# Patient Record
Sex: FEMALE | Race: WHITE | Employment: OTHER | URBAN - NONMETROPOLITAN AREA
[De-identification: names, ages, dates, MRNs, and addresses within clinical notes are randomized per-mention and may not be internally consistent; named-entity substitution may affect disease eponyms.]

---

## 2020-06-16 ENCOUNTER — APPOINTMENT (OUTPATIENT)
Dept: CT IMAGING | Age: 65
DRG: 291 | End: 2020-06-16
Payer: MEDICARE

## 2020-06-16 ENCOUNTER — HOSPITAL ENCOUNTER (INPATIENT)
Age: 65
LOS: 3 days | Discharge: ANOTHER HOSPITAL | DRG: 291 | End: 2020-06-19
Attending: EMERGENCY MEDICINE | Admitting: INTERNAL MEDICINE
Payer: MEDICARE

## 2020-06-16 ENCOUNTER — APPOINTMENT (OUTPATIENT)
Dept: GENERAL RADIOLOGY | Age: 65
DRG: 291 | End: 2020-06-16
Payer: MEDICARE

## 2020-06-16 PROBLEM — I50.9 ACUTE CONGESTIVE HEART FAILURE (HCC): Status: ACTIVE | Noted: 2020-06-16

## 2020-06-16 LAB
-: ABNORMAL
ABSOLUTE EOS #: <0.03 K/UL (ref 0–0.44)
ABSOLUTE IMMATURE GRANULOCYTE: 0.03 K/UL (ref 0–0.3)
ABSOLUTE LYMPH #: 1.27 K/UL (ref 1.1–3.7)
ABSOLUTE MONO #: 0.48 K/UL (ref 0.1–1.2)
ALBUMIN SERPL-MCNC: 3.9 G/DL (ref 3.5–5.2)
ALBUMIN/GLOBULIN RATIO: 1.6 (ref 1–2.5)
ALP BLD-CCNC: 105 U/L (ref 35–104)
ALT SERPL-CCNC: 14 U/L (ref 5–33)
AMORPHOUS: ABNORMAL
ANION GAP SERPL CALCULATED.3IONS-SCNC: 14 MMOL/L (ref 9–17)
AST SERPL-CCNC: 25 U/L
BACTERIA: ABNORMAL
BASOPHILS # BLD: 1 % (ref 0–2)
BASOPHILS ABSOLUTE: 0.05 K/UL (ref 0–0.2)
BILIRUB SERPL-MCNC: 0.53 MG/DL (ref 0.3–1.2)
BILIRUBIN URINE: NEGATIVE
BNP INTERPRETATION: ABNORMAL
BUN BLDV-MCNC: 8 MG/DL (ref 8–23)
BUN/CREAT BLD: 19 (ref 9–20)
CALCIUM SERPL-MCNC: 7.7 MG/DL (ref 8.6–10.4)
CASTS UA: ABNORMAL /LPF
CHLORIDE BLD-SCNC: 103 MMOL/L (ref 98–107)
CO2: 26 MMOL/L (ref 20–31)
COLOR: YELLOW
COMMENT UA: NORMAL
CREAT SERPL-MCNC: 0.42 MG/DL (ref 0.5–0.9)
CRYSTALS, UA: ABNORMAL /HPF
D-DIMER QUANTITATIVE: 1.73 MG/L FEU (ref 0–0.59)
DIFFERENTIAL TYPE: ABNORMAL
EKG ATRIAL RATE: 92 BPM
EKG P AXIS: 78 DEGREES
EKG P-R INTERVAL: 130 MS
EKG Q-T INTERVAL: 370 MS
EKG QRS DURATION: 82 MS
EKG QTC CALCULATION (BAZETT): 457 MS
EKG R AXIS: 22 DEGREES
EKG T AXIS: 81 DEGREES
EKG VENTRICULAR RATE: 92 BPM
EOSINOPHILS RELATIVE PERCENT: 0 % (ref 1–4)
EPITHELIAL CELLS UA: ABNORMAL /HPF (ref 0–25)
GFR AFRICAN AMERICAN: >60 ML/MIN
GFR NON-AFRICAN AMERICAN: >60 ML/MIN
GFR SERPL CREATININE-BSD FRML MDRD: ABNORMAL ML/MIN/{1.73_M2}
GFR SERPL CREATININE-BSD FRML MDRD: ABNORMAL ML/MIN/{1.73_M2}
GLUCOSE BLD-MCNC: 112 MG/DL (ref 70–99)
GLUCOSE URINE: NEGATIVE
HCT VFR BLD CALC: 48.1 % (ref 36.3–47.1)
HEMOGLOBIN: 14.5 G/DL (ref 11.9–15.1)
IMMATURE GRANULOCYTES: 1 %
INR BLD: 1.2
KETONES, URINE: NEGATIVE
LEUKOCYTE ESTERASE, URINE: NEGATIVE
LYMPHOCYTES # BLD: 20 % (ref 24–43)
MCH RBC QN AUTO: 26.7 PG (ref 25.2–33.5)
MCHC RBC AUTO-ENTMCNC: 30.1 G/DL (ref 28.4–34.8)
MCV RBC AUTO: 88.4 FL (ref 82.6–102.9)
MONOCYTES # BLD: 8 % (ref 3–12)
MUCUS: ABNORMAL
NITRITE, URINE: NEGATIVE
NRBC AUTOMATED: 0 PER 100 WBC
OTHER OBSERVATIONS UA: ABNORMAL
PDW BLD-RTO: 17.9 % (ref 11.8–14.4)
PH UA: 6 (ref 5–9)
PLATELET # BLD: 186 K/UL (ref 138–453)
PLATELET ESTIMATE: ABNORMAL
PMV BLD AUTO: 12.2 FL (ref 8.1–13.5)
POTASSIUM SERPL-SCNC: 3.6 MMOL/L (ref 3.7–5.3)
PRO-BNP: 9013 PG/ML
PROTEIN UA: NEGATIVE
PROTHROMBIN TIME: 14.8 SEC (ref 11.8–14.6)
RBC # BLD: 5.44 M/UL (ref 3.95–5.11)
RBC # BLD: ABNORMAL 10*6/UL
RBC UA: ABNORMAL /HPF (ref 0–2)
RENAL EPITHELIAL, UA: ABNORMAL /HPF
SARS-COV-2, PCR: NORMAL
SARS-COV-2, RAPID: NOT DETECTED
SARS-COV-2: NORMAL
SEG NEUTROPHILS: 70 % (ref 36–65)
SEGMENTED NEUTROPHILS ABSOLUTE COUNT: 4.52 K/UL (ref 1.5–8.1)
SODIUM BLD-SCNC: 143 MMOL/L (ref 135–144)
SOURCE: NORMAL
SPECIFIC GRAVITY UA: 1.01 (ref 1.01–1.02)
TOTAL PROTEIN: 6.4 G/DL (ref 6.4–8.3)
TRICHOMONAS: ABNORMAL
TROPONIN INTERP: ABNORMAL
TROPONIN INTERP: ABNORMAL
TROPONIN T: ABNORMAL NG/ML
TROPONIN T: ABNORMAL NG/ML
TROPONIN, HIGH SENSITIVITY: 30 NG/L (ref 0–14)
TROPONIN, HIGH SENSITIVITY: 31 NG/L (ref 0–14)
TSH SERPL DL<=0.05 MIU/L-ACNC: 0.75 MIU/L (ref 0.3–5)
TURBIDITY: CLEAR
URINE HGB: NEGATIVE
UROBILINOGEN, URINE: NORMAL
WBC # BLD: 6.4 K/UL (ref 3.5–11.3)
WBC # BLD: ABNORMAL 10*3/UL
WBC UA: ABNORMAL /HPF (ref 0–5)
YEAST: ABNORMAL

## 2020-06-16 PROCEDURE — 85610 PROTHROMBIN TIME: CPT

## 2020-06-16 PROCEDURE — 71260 CT THORAX DX C+: CPT

## 2020-06-16 PROCEDURE — 6360000004 HC RX CONTRAST MEDICATION: Performed by: EMERGENCY MEDICINE

## 2020-06-16 PROCEDURE — U0002 COVID-19 LAB TEST NON-CDC: HCPCS

## 2020-06-16 PROCEDURE — 6370000000 HC RX 637 (ALT 250 FOR IP): Performed by: INTERNAL MEDICINE

## 2020-06-16 PROCEDURE — 71045 X-RAY EXAM CHEST 1 VIEW: CPT

## 2020-06-16 PROCEDURE — 84443 ASSAY THYROID STIM HORMONE: CPT

## 2020-06-16 PROCEDURE — 94664 DEMO&/EVAL PT USE INHALER: CPT

## 2020-06-16 PROCEDURE — 36415 COLL VENOUS BLD VENIPUNCTURE: CPT

## 2020-06-16 PROCEDURE — 85379 FIBRIN DEGRADATION QUANT: CPT

## 2020-06-16 PROCEDURE — 6360000002 HC RX W HCPCS: Performed by: INTERNAL MEDICINE

## 2020-06-16 PROCEDURE — 6370000000 HC RX 637 (ALT 250 FOR IP): Performed by: EMERGENCY MEDICINE

## 2020-06-16 PROCEDURE — 6360000002 HC RX W HCPCS: Performed by: EMERGENCY MEDICINE

## 2020-06-16 PROCEDURE — 93010 ELECTROCARDIOGRAM REPORT: CPT | Performed by: INTERNAL MEDICINE

## 2020-06-16 PROCEDURE — 83880 ASSAY OF NATRIURETIC PEPTIDE: CPT

## 2020-06-16 PROCEDURE — 84484 ASSAY OF TROPONIN QUANT: CPT

## 2020-06-16 PROCEDURE — 1200000000 HC SEMI PRIVATE

## 2020-06-16 PROCEDURE — 94640 AIRWAY INHALATION TREATMENT: CPT

## 2020-06-16 PROCEDURE — 96375 TX/PRO/DX INJ NEW DRUG ADDON: CPT

## 2020-06-16 PROCEDURE — 93005 ELECTROCARDIOGRAM TRACING: CPT | Performed by: EMERGENCY MEDICINE

## 2020-06-16 PROCEDURE — 80053 COMPREHEN METABOLIC PANEL: CPT

## 2020-06-16 PROCEDURE — 2580000003 HC RX 258: Performed by: INTERNAL MEDICINE

## 2020-06-16 PROCEDURE — 96374 THER/PROPH/DIAG INJ IV PUSH: CPT

## 2020-06-16 PROCEDURE — 85025 COMPLETE CBC W/AUTO DIFF WBC: CPT

## 2020-06-16 PROCEDURE — 81001 URINALYSIS AUTO W/SCOPE: CPT

## 2020-06-16 PROCEDURE — 99285 EMERGENCY DEPT VISIT HI MDM: CPT

## 2020-06-16 PROCEDURE — 84439 ASSAY OF FREE THYROXINE: CPT

## 2020-06-16 RX ORDER — PROMETHAZINE HYDROCHLORIDE 25 MG/1
12.5 TABLET ORAL EVERY 6 HOURS PRN
Status: DISCONTINUED | OUTPATIENT
Start: 2020-06-16 | End: 2020-06-19 | Stop reason: HOSPADM

## 2020-06-16 RX ORDER — ACETAMINOPHEN 325 MG/1
650 TABLET ORAL EVERY 6 HOURS PRN
Status: DISCONTINUED | OUTPATIENT
Start: 2020-06-16 | End: 2020-06-19 | Stop reason: HOSPADM

## 2020-06-16 RX ORDER — FAMOTIDINE 20 MG/1
20 TABLET, FILM COATED ORAL 2 TIMES DAILY
Status: DISCONTINUED | OUTPATIENT
Start: 2020-06-16 | End: 2020-06-19 | Stop reason: HOSPADM

## 2020-06-16 RX ORDER — METHYLPREDNISOLONE SODIUM SUCCINATE 125 MG/2ML
125 INJECTION, POWDER, LYOPHILIZED, FOR SOLUTION INTRAMUSCULAR; INTRAVENOUS ONCE
Status: COMPLETED | OUTPATIENT
Start: 2020-06-16 | End: 2020-06-16

## 2020-06-16 RX ORDER — NITROGLYCERIN 0.4 MG/1
0.4 TABLET SUBLINGUAL ONCE
Status: COMPLETED | OUTPATIENT
Start: 2020-06-16 | End: 2020-06-16

## 2020-06-16 RX ORDER — SODIUM CHLORIDE 0.9 % (FLUSH) 0.9 %
10 SYRINGE (ML) INJECTION EVERY 12 HOURS SCHEDULED
Status: DISCONTINUED | OUTPATIENT
Start: 2020-06-16 | End: 2020-06-19 | Stop reason: HOSPADM

## 2020-06-16 RX ORDER — LISINOPRIL 5 MG/1
5 TABLET ORAL DAILY
Status: DISCONTINUED | OUTPATIENT
Start: 2020-06-16 | End: 2020-06-18

## 2020-06-16 RX ORDER — FUROSEMIDE 10 MG/ML
40 INJECTION INTRAMUSCULAR; INTRAVENOUS DAILY
Status: DISCONTINUED | OUTPATIENT
Start: 2020-06-16 | End: 2020-06-17

## 2020-06-16 RX ORDER — ACETAMINOPHEN 650 MG/1
650 SUPPOSITORY RECTAL EVERY 6 HOURS PRN
Status: DISCONTINUED | OUTPATIENT
Start: 2020-06-16 | End: 2020-06-19 | Stop reason: HOSPADM

## 2020-06-16 RX ORDER — FUROSEMIDE 10 MG/ML
40 INJECTION INTRAMUSCULAR; INTRAVENOUS ONCE
Status: COMPLETED | OUTPATIENT
Start: 2020-06-16 | End: 2020-06-16

## 2020-06-16 RX ORDER — SODIUM CHLORIDE 0.9 % (FLUSH) 0.9 %
10 SYRINGE (ML) INJECTION PRN
Status: DISCONTINUED | OUTPATIENT
Start: 2020-06-16 | End: 2020-06-19 | Stop reason: HOSPADM

## 2020-06-16 RX ORDER — POLYETHYLENE GLYCOL 3350 17 G/17G
17 POWDER, FOR SOLUTION ORAL DAILY PRN
Status: DISCONTINUED | OUTPATIENT
Start: 2020-06-16 | End: 2020-06-19 | Stop reason: HOSPADM

## 2020-06-16 RX ORDER — ONDANSETRON 2 MG/ML
4 INJECTION INTRAMUSCULAR; INTRAVENOUS EVERY 6 HOURS PRN
Status: DISCONTINUED | OUTPATIENT
Start: 2020-06-16 | End: 2020-06-19 | Stop reason: HOSPADM

## 2020-06-16 RX ORDER — ACETAMINOPHEN 500 MG
1000 TABLET ORAL ONCE
Status: COMPLETED | OUTPATIENT
Start: 2020-06-16 | End: 2020-06-16

## 2020-06-16 RX ORDER — IPRATROPIUM BROMIDE AND ALBUTEROL SULFATE 2.5; .5 MG/3ML; MG/3ML
1 SOLUTION RESPIRATORY (INHALATION) ONCE
Status: COMPLETED | OUTPATIENT
Start: 2020-06-16 | End: 2020-06-16

## 2020-06-16 RX ORDER — BIOTIN 10 MG
1 TABLET ORAL DAILY
COMMUNITY

## 2020-06-16 RX ORDER — DIAPER,BRIEF,INFANT-TODD,DISP
EACH MISCELLANEOUS 2 TIMES DAILY
Status: ON HOLD | COMMUNITY
End: 2020-06-25 | Stop reason: HOSPADM

## 2020-06-16 RX ADMIN — SODIUM CHLORIDE, PRESERVATIVE FREE 10 ML: 5 INJECTION INTRAVENOUS at 20:21

## 2020-06-16 RX ADMIN — FAMOTIDINE 20 MG: 20 TABLET, FILM COATED ORAL at 20:18

## 2020-06-16 RX ADMIN — NITROGLYCERIN 0.4 MG: 0.4 TABLET SUBLINGUAL at 14:19

## 2020-06-16 RX ADMIN — ACETAMINOPHEN 1000 MG: 500 TABLET, FILM COATED ORAL at 14:47

## 2020-06-16 RX ADMIN — LISINOPRIL 5 MG: 5 TABLET ORAL at 20:18

## 2020-06-16 RX ADMIN — IOPAMIDOL 75 ML: 755 INJECTION, SOLUTION INTRAVENOUS at 15:18

## 2020-06-16 RX ADMIN — ENOXAPARIN SODIUM 90 MG: 100 INJECTION SUBCUTANEOUS at 20:18

## 2020-06-16 RX ADMIN — IPRATROPIUM BROMIDE AND ALBUTEROL SULFATE 1 AMPULE: .5; 3 SOLUTION RESPIRATORY (INHALATION) at 13:20

## 2020-06-16 RX ADMIN — METHYLPREDNISOLONE SODIUM SUCCINATE 125 MG: 125 INJECTION, POWDER, FOR SOLUTION INTRAMUSCULAR; INTRAVENOUS at 13:18

## 2020-06-16 RX ADMIN — FUROSEMIDE 40 MG: 10 INJECTION, SOLUTION INTRAMUSCULAR; INTRAVENOUS at 13:17

## 2020-06-16 ASSESSMENT — PAIN SCALES - GENERAL
PAINLEVEL_OUTOF10: 4
PAINLEVEL_OUTOF10: 0

## 2020-06-16 NOTE — ED NOTES
Dr. Hazel Jang called back and spoke to Dr. Alesia Hilton.  Alie Santos, RN       Alie Santos, RN  06/16/20 0577

## 2020-06-17 ENCOUNTER — APPOINTMENT (OUTPATIENT)
Dept: VASCULAR LAB | Age: 65
DRG: 291 | End: 2020-06-17
Payer: MEDICARE

## 2020-06-17 ENCOUNTER — APPOINTMENT (OUTPATIENT)
Dept: NON INVASIVE DIAGNOSTICS | Age: 65
DRG: 291 | End: 2020-06-17
Payer: MEDICARE

## 2020-06-17 PROBLEM — I26.99 ACUTE PULMONARY EMBOLISM (HCC): Status: ACTIVE | Noted: 2020-06-17

## 2020-06-17 PROBLEM — I50.31 ACUTE DIASTOLIC CONGESTIVE HEART FAILURE (HCC): Status: ACTIVE | Noted: 2020-06-16

## 2020-06-17 PROBLEM — R60.1 ANASARCA: Status: ACTIVE | Noted: 2020-06-17

## 2020-06-17 LAB
ANION GAP SERPL CALCULATED.3IONS-SCNC: 11 MMOL/L (ref 9–17)
BUN BLDV-MCNC: 7 MG/DL (ref 8–23)
BUN/CREAT BLD: 14 (ref 9–20)
CALCIUM SERPL-MCNC: 7.2 MG/DL (ref 8.6–10.4)
CHLORIDE BLD-SCNC: 104 MMOL/L (ref 98–107)
CHOLESTEROL/HDL RATIO: 2.1
CHOLESTEROL: 110 MG/DL
CO2: 32 MMOL/L (ref 20–31)
CREAT SERPL-MCNC: 0.51 MG/DL (ref 0.5–0.9)
EKG ATRIAL RATE: 101 BPM
EKG P AXIS: 67 DEGREES
EKG P-R INTERVAL: 132 MS
EKG Q-T INTERVAL: 358 MS
EKG QRS DURATION: 74 MS
EKG QTC CALCULATION (BAZETT): 464 MS
EKG R AXIS: 6 DEGREES
EKG T AXIS: 64 DEGREES
EKG VENTRICULAR RATE: 101 BPM
GFR AFRICAN AMERICAN: >60 ML/MIN
GFR NON-AFRICAN AMERICAN: >60 ML/MIN
GFR SERPL CREATININE-BSD FRML MDRD: ABNORMAL ML/MIN/{1.73_M2}
GFR SERPL CREATININE-BSD FRML MDRD: ABNORMAL ML/MIN/{1.73_M2}
GLUCOSE BLD-MCNC: 86 MG/DL (ref 70–99)
HDLC SERPL-MCNC: 52 MG/DL
LDL CHOLESTEROL: 47 MG/DL (ref 0–130)
LV EF: 23 %
LVEF MODALITY: NORMAL
MAGNESIUM: 1.8 MG/DL (ref 1.6–2.6)
POTASSIUM SERPL-SCNC: 3.5 MMOL/L (ref 3.7–5.3)
SODIUM BLD-SCNC: 147 MMOL/L (ref 135–144)
THYROXINE, FREE: 1.53 NG/DL (ref 0.93–1.7)
TRIGL SERPL-MCNC: 56 MG/DL
VLDLC SERPL CALC-MCNC: NORMAL MG/DL (ref 1–30)

## 2020-06-17 PROCEDURE — 6360000002 HC RX W HCPCS: Performed by: FAMILY MEDICINE

## 2020-06-17 PROCEDURE — 6370000000 HC RX 637 (ALT 250 FOR IP): Performed by: INTERNAL MEDICINE

## 2020-06-17 PROCEDURE — 93306 TTE W/DOPPLER COMPLETE: CPT

## 2020-06-17 PROCEDURE — 80061 LIPID PANEL: CPT

## 2020-06-17 PROCEDURE — 3430000000 HC RX DIAGNOSTIC RADIOPHARMACEUTICAL: Performed by: INTERNAL MEDICINE

## 2020-06-17 PROCEDURE — 93017 CV STRESS TEST TRACING ONLY: CPT

## 2020-06-17 PROCEDURE — 78452 HT MUSCLE IMAGE SPECT MULT: CPT

## 2020-06-17 PROCEDURE — 83735 ASSAY OF MAGNESIUM: CPT

## 2020-06-17 PROCEDURE — 93010 ELECTROCARDIOGRAM REPORT: CPT | Performed by: INTERNAL MEDICINE

## 2020-06-17 PROCEDURE — 93005 ELECTROCARDIOGRAM TRACING: CPT | Performed by: NURSE PRACTITIONER

## 2020-06-17 PROCEDURE — 36415 COLL VENOUS BLD VENIPUNCTURE: CPT

## 2020-06-17 PROCEDURE — 6360000002 HC RX W HCPCS: Performed by: INTERNAL MEDICINE

## 2020-06-17 PROCEDURE — 1200000000 HC SEMI PRIVATE

## 2020-06-17 PROCEDURE — 80048 BASIC METABOLIC PNL TOTAL CA: CPT

## 2020-06-17 PROCEDURE — 93970 EXTREMITY STUDY: CPT

## 2020-06-17 PROCEDURE — 2580000003 HC RX 258: Performed by: INTERNAL MEDICINE

## 2020-06-17 PROCEDURE — 99222 1ST HOSP IP/OBS MODERATE 55: CPT | Performed by: INTERNAL MEDICINE

## 2020-06-17 PROCEDURE — A9500 TC99M SESTAMIBI: HCPCS | Performed by: INTERNAL MEDICINE

## 2020-06-17 RX ORDER — CARVEDILOL 3.12 MG/1
3.12 TABLET ORAL 2 TIMES DAILY WITH MEALS
Status: DISCONTINUED | OUTPATIENT
Start: 2020-06-17 | End: 2020-06-19 | Stop reason: HOSPADM

## 2020-06-17 RX ORDER — FUROSEMIDE 10 MG/ML
40 INJECTION INTRAMUSCULAR; INTRAVENOUS 2 TIMES DAILY
Status: DISCONTINUED | OUTPATIENT
Start: 2020-06-17 | End: 2020-06-18

## 2020-06-17 RX ADMIN — FAMOTIDINE 20 MG: 20 TABLET, FILM COATED ORAL at 21:40

## 2020-06-17 RX ADMIN — SODIUM CHLORIDE, PRESERVATIVE FREE 10 ML: 5 INJECTION INTRAVENOUS at 08:23

## 2020-06-17 RX ADMIN — ENOXAPARIN SODIUM 90 MG: 100 INJECTION SUBCUTANEOUS at 21:40

## 2020-06-17 RX ADMIN — ACETAMINOPHEN 650 MG: 325 TABLET, FILM COATED ORAL at 14:50

## 2020-06-17 RX ADMIN — LISINOPRIL 5 MG: 5 TABLET ORAL at 08:31

## 2020-06-17 RX ADMIN — FUROSEMIDE 40 MG: 10 INJECTION, SOLUTION INTRAMUSCULAR; INTRAVENOUS at 08:22

## 2020-06-17 RX ADMIN — Medication 10 MILLICURIE: at 10:40

## 2020-06-17 RX ADMIN — CARVEDILOL 3.12 MG: 3.12 TABLET, FILM COATED ORAL at 16:57

## 2020-06-17 RX ADMIN — FUROSEMIDE 40 MG: 10 INJECTION, SOLUTION INTRAMUSCULAR; INTRAVENOUS at 16:57

## 2020-06-17 RX ADMIN — Medication 30 MILLICURIE: at 12:28

## 2020-06-17 RX ADMIN — FAMOTIDINE 20 MG: 20 TABLET, FILM COATED ORAL at 08:21

## 2020-06-17 RX ADMIN — SODIUM CHLORIDE, PRESERVATIVE FREE 10 ML: 5 INJECTION INTRAVENOUS at 21:40

## 2020-06-17 RX ADMIN — ENOXAPARIN SODIUM 90 MG: 100 INJECTION SUBCUTANEOUS at 08:22

## 2020-06-17 RX ADMIN — REGADENOSON 0.4 MG: 0.08 INJECTION, SOLUTION INTRAVENOUS at 12:28

## 2020-06-17 ASSESSMENT — PAIN DESCRIPTION - FREQUENCY: FREQUENCY: INTERMITTENT

## 2020-06-17 ASSESSMENT — ENCOUNTER SYMPTOMS
ABDOMINAL PAIN: 0
EYE PAIN: 0
SHORTNESS OF BREATH: 1

## 2020-06-17 ASSESSMENT — PAIN DESCRIPTION - DESCRIPTORS: DESCRIPTORS: ACHING

## 2020-06-17 ASSESSMENT — PAIN SCALES - GENERAL
PAINLEVEL_OUTOF10: 0
PAINLEVEL_OUTOF10: 4
PAINLEVEL_OUTOF10: 0
PAINLEVEL_OUTOF10: 2
PAINLEVEL_OUTOF10: 0

## 2020-06-17 ASSESSMENT — PAIN DESCRIPTION - PAIN TYPE: TYPE: ACUTE PAIN

## 2020-06-17 ASSESSMENT — PAIN DESCRIPTION - LOCATION: LOCATION: BACK

## 2020-06-17 ASSESSMENT — PAIN DESCRIPTION - ORIENTATION: ORIENTATION: LOWER

## 2020-06-17 NOTE — PROGRESS NOTES
Met with Patient this a.m. to discuss her discharge plans. Patient is a very pleasant 72year old , white female, admitted with a diagnosis of Acute CHF. Patient is alert and oriented, cooperative with this assessment. States that she would like to return home with family upon discharge. Patient resides in [facility] with her . Other family members are currently staying with Patient as well. She uses no DME and has no outside resources currently in place. Patient drives herself, manages her own medications and is independent with her ADL's. PCP is [facility] practitioner Aleshia Gunter. Patient reports that she has \"good insurance\" and denies needing further financial assistance to afford her routine medications. Discharge plan is home when medically stable. Discussed additional services and home health care options but Patient does not feel this is warranted at this time. LSW to monitor for additional needs and assist accordingly. Patient is a 'Full Code' status. States that her medical directives are in place and she will have her  bring a copy to the hospital for her medical record.     Adrián. [address]  6/17/2020

## 2020-06-17 NOTE — PROGRESS NOTES
(Comment)(Diet history of excessive sodium intake, diagnosis of CHF, and severe edema.  ;)    Objective Information:  · Nutrition-Focused Physical Findings: Edema   · Wound Type: None  · Current Nutrition Therapies:  · Oral Diet Orders: NPO   · Oral Diet intake: NPO(% prior to NPO status )  · Oral Nutrition Supplement (ONS) Orders: None  · ONS intake: NPO  · Anthropometric Measures:  · Ht: 5' 5\" (165.1 cm)   · Current Body Wt: 197 lb (89.4 kg)  · Admission Body Wt: 270 lb (122.5 kg)(Estimated weight by ER)  · Usual Body Wt: 160 lb (72.6 kg)  · % Weight Change:  ,  23% weight gain over time due to fluid shifts   · Ideal Body Wt: 125 lb (56.7 kg), % Ideal Body 158%  · BMI Classification: BMI 30.0 - 34.9 Obese Class I    Lab Results   Component Value Date     (H) 06/17/2020    K 3.5 (L) 06/17/2020     06/17/2020    CO2 32 (H) 06/17/2020    BUN 7 (L) 06/17/2020    CREATININE 0.51 06/17/2020    GLUCOSE 86 06/17/2020    CALCIUM 7.2 (L) 06/17/2020    PROT 6.4 06/16/2020    LABALBU 3.9 06/16/2020    BILITOT 0.53 06/16/2020    ALKPHOS 105 (H) 06/16/2020    AST 25 06/16/2020    ALT 14 06/16/2020    LABGLOM >60 06/17/2020    GFRAA >60 06/17/2020     No results found for: LABA1C  No results found for: EAG    Nutrition Interventions:   Start oral diet(Patient to progress to oral diet-2gm low sodium)  Education Initiated, Education Completed, Coordination of Care, Continued Inpatient Monitoring    Nutrition Evaluation:   · Evaluation: Goals set   · Goals: PO intake >75% when diet progress with low sodium food options utilized     · Monitoring: Meal Intake, I&O, Weight, Pertinent Labs, Patient/Family Education      Electronically signed by Edilson Lewis on 6/17/20 at 12:02 PM EDT    Contact Number: [phone]

## 2020-06-17 NOTE — PLAN OF CARE
INTOLERANCE/IMPAIRED MOBILITY  Goal: Mobility/activity is maintained at optimum level for patient  Outcome: Ongoing  Note: PT/OT working with the patient.

## 2020-06-17 NOTE — CONSULTS
has no known allergies. REVIEW OF SYSTEMS: 14 systems were reviewed. Pertinent positives and negatives as above, all else negative. Past Surgical History:   Procedure Laterality Date    CHOLECYSTECTOMY      HERNIA REPAIR      x2    TONSILLECTOMY      Social History:  Social History     Tobacco Use    Smoking status: Current Every Day Smoker     Packs/day: 0.25     Years: 20.00     Pack years: 5.00     Types: Cigarettes    Smokeless tobacco: Current User   Substance Use Topics    Alcohol use: Not Currently    Drug use: Never        CURRENT MEDICATIONS:  Outpatient Medications Marked as Taking for the 6/16/20 encounter [facility] Encounter)   Medication Sig Dispense Refill    calcium citrate-vitamin d (CALCIUM CITRATE + D) 250-200 MG-UNIT TABS Take by mouth 2 times daily      Multiple Vitamins-Minerals (MULTIVITAMIN ADULT) CHEW Take by mouth daily        furosemide  40 mg Intravenous BID    carvedilol  3.125 mg Oral BID WC    sodium chloride flush  10 mL Intravenous 2 times per day    famotidine  20 mg Oral BID    enoxaparin  1 mg/kg Subcutaneous BID    lisinopril  5 mg Oral Daily       FAMILY HISTORY: No family history of premature CAD. PHYSICAL EXAM:   /62   Pulse 87   Temp 97.7 °F (36.5 °C) (Temporal)   Resp 18   Ht 5' 5\" (1.651 m)   Wt 197 lb 6.4 oz (89.5 kg)   SpO2 90%   BMI 32.85 kg/m²  Body mass index is 32.85 kg/m². Constitutional: She is oriented to person, place, and time. She appears well-developed and well-nourished. In no acute distress. HEENT: Normocephalic and atraumatic. No JVD present. Carotid bruit is not present. No mass and no thyromegaly present. No lymphadenopathy present. Cardiovascular: Normal rate, regular rhythm, normal heart sounds and intact distal pulses. Exam reveals no gallop and no friction rub. 2/6 systolic murmur at the apex without radiation. Pulmonary/Chest: Kyphoscoliosis noted. Poor air entry bilaterally. No significant wheezes. Bilateral basal crackles noted. Abdominal: Soft, non-tender. Bowel sounds and aorta are normal. She exhibits no organomegaly, mass or bruit. Extremities: Massive bilateral lower extremity edema. Hard to palpate distal pulses in bilateral lower extremities. Neurological: She is alert and oriented to person, place, and time. No evidence of gross cranial nerve deficit. Coordination appeared normal.   Skin: Skin is warm and dry. There is no rash or diaphoresis. Psychiatric: She has a normal mood and affect. Her speech is normal and behavior is normal.      MOST RECENT LABS ON RECORD:   Lab Results   Component Value Date    WBC 6.4 06/16/2020    HGB 14.5 06/16/2020    HCT 48.1 (H) 06/16/2020     06/16/2020    CHOL 110 06/17/2020    TRIG 56 06/17/2020    HDL 52 06/17/2020    ALT 14 06/16/2020    AST 25 06/16/2020     (H) 06/17/2020    K 3.5 (L) 06/17/2020     06/17/2020    CREATININE 0.51 06/17/2020    BUN 7 (L) 06/17/2020    CO2 32 (H) 06/17/2020    TSH 0.75 06/16/2020    INR 1.2 06/16/2020       ASSESSMENT:  Patient Active Problem List    Diagnosis Date Noted    Anasarca 06/17/2020     Priority: Low    Acute pulmonary embolism ([facility] [address] 06/17/2020     Priority: Low    Acute congestive heart failure ([facility] [address] 06/16/2020     Priority: Low       PLAN:  Patient admitted with acute on chronic congestive heart failure, NYHA class IV. Echo showing ejection fraction of 30 to 35%, severe global hypokinesis with no segmental abnormalities. Left ventricular wall thickness is mildly increased. Biatrial enlargement noted and moderate to severe diastolic dysfunction. Discussed possible etiology of her severe cardiomyopathy. Her risk factors for CAD include chronic smoking and age. She denied any history of diabetes, dyslipidemia or hypertension. She denied any family history of premature CAD.     I told her since two thirds of the causes of cardiomyopathy in her age group are caused by coronary artery

## 2020-06-17 NOTE — H&P
History and Physical    Patient:  Bernardo Conklin  MRN: [id number]    Chief Complaint:  SOB    History Obtained From:  patient    PCP: Gissell Gunter    History of Present Illness: The patient is a 72 y.o. female who presents with SOB and leg swelling. Patient states that since March she noticed that her legs were having swelling in them. She states that due to the Covid thing going on, she let things go. She states that the swelling continued to increase as well as she developed SOB. She stated that she noticed he pants were getting \"stuck\" on her legs and that her pants were getting \"tight\" on her abdomen and she would have to loosen. She also states that she notice when she bent over and stood up, she got lightheaded. She is unable to lye flat to sleep and has slept in a chair the last few days. She states that her legs have been aching. She admits to smoke 1-2 cigarettes a day x 20 years, and states in the begging of her smoking history she smoke more. She denies CP or diaphoresis. She admits to cough. Denies fever/chills. She denies any cardiac history or family history of CAD. Past Medical History:        Diagnosis Date    A-fib [facility])     CHF (congestive heart failure) (HCC)     COPD (chronic obstructive pulmonary disease) (HCC)        Past Surgical History:        Procedure Laterality Date    CHOLECYSTECTOMY      HERNIA REPAIR      x2    TONSILLECTOMY         Family History:   History reviewed. No pertinent family history. Social History:   TOBACCO:   reports that she has been smoking cigarettes. She has a 5.00 pack-year smoking history. She uses smokeless tobacco.  ETOH:   reports previous alcohol use. ELICIT DRUG USE:    Social History     Substance and Sexual Activity   Drug Use Never     OCCUPATION: Retired from [employer] after 30 years      Allergies:  Patient has no known allergies.     Medications Prior to Admission:    Prior to Admission medications    Medication Sig Start Date End Date abdomen  NEURO: follows commands, FRANCIS, no deficits  SKIN:  no rashes or significant lesions  -----------------------------------------------------------------  Diagnostic Data:   Lab Results   Component Value Date    WBC 6.4 06/16/2020    HGB 14.5 06/16/2020     06/16/2020       Lab Results   Component Value Date    BUN 7 (L) 06/17/2020    CREATININE 0.51 06/17/2020     (H) 06/17/2020    K 3.5 (L) 06/17/2020    CALCIUM 7.2 (L) 06/17/2020     06/17/2020    CO2 32 (H) 06/17/2020    LABGLOM >60 06/17/2020       Lab Results   Component Value Date    WBCUA None 06/16/2020    RBCUA None 06/16/2020    EPITHUA 0 TO 2 06/16/2020    LEUKOCYTESUR NEGATIVE 06/16/2020    SPECGRAV 1.015 06/16/2020    GLUCOSEU NEGATIVE 06/16/2020    KETUA NEGATIVE 06/16/2020    PROTEINU NEGATIVE 06/16/2020    HGBUR NEGATIVE 06/16/2020    CASTUA NOT REPORTED 06/16/2020    CRYSTUA NOT REPORTED 06/16/2020    BACTERIA 1+ (A) 06/16/2020    YEAST NOT REPORTED 06/16/2020       Lab Results   Component Value Date    TROPONINT NOT REPORTED 06/16/2020    PROBNP 9,013 (H) 06/16/2020       Xr Chest Portable    Result Date: 6/16/2020  EXAMINATION: ONE XRAY VIEW OF THE CHEST 6/16/2020 1:06 pm COMPARISON: None. HISTORY: ORDERING SYSTEM PROVIDED HISTORY: SOB, leg swelling TECHNOLOGIST PROVIDED HISTORY: SOB, leg swelling FINDINGS: Chronic pulmonary changes with prominent interstitium and bilateral pleural effusions. Enlarged heart. Bibasilar airspace disease. Findings of CHF/volume overload. Ct Chest Pulmonary Embolism W Contrast    Result Date: 6/16/2020  EXAMINATION: CTA OF THE CHEST 6/16/2020 3:17 pm TECHNIQUE: CTA of the chest was performed after the administration of intravenous contrast.  Multiplanar reformatted images are provided for review. MIP images are provided for review.  Dose modulation, iterative reconstruction, and/or weight based adjustment of the mA/kV was utilized to reduce the radiation dose to as low as reasonably achievable. COMPARISON: 6/16/2020 HISTORY: ORDERING SYSTEM PROVIDED HISTORY: SOB, acute CHF, elevated d diimer, negative COVID TECHNOLOGIST PROVIDED HISTORY: SOB, acute CHF, elevated d diimer, negative COVID FINDINGS: Pulmonary Arteries: Subtle filling defect right upper lobe subsegmental branch pulmonary embolism suspected no central or segmental pulmonary embolism is apparent. Mediastinum: Enlarged heart. Trace pericardial effusion. Haziness of the mediastinal fat likely from fluid overload state. No enlarged lymph nodes. Lungs/pleura: Trace bilateral effusions with associated bibasilar airspace disease. Prominence of the pulmonary interstitium. Vague ground-glass opacities. Findings compatible with volume overload. Scattered areas of atelectasis. Upper Abdomen: No acute upper abdominal findings. Fatty liver. Postsurgical changes of the stomach. Soft Tissues/Bones: Body wall edema. No acute osseous abnormality. Imaging findings of CHF decompensation. Subtle eccentric filling defect in right upper lobe branch pulmonary artery (series 601, image 98) is suspicious of small subsegmental pulmonary embolism. Critical results were called by Dr. Marlene Roach to [facility] on 6/16/2020 at 15:38. Assessment:    Principal Problem:    Acute congestive heart failure ([facility] [address]  Resolved Problems:    * No resolved hospital problems.  *      Patient Active Problem List    Diagnosis Date Noted    Acute congestive heart failure ([facility] [address] 06/16/2020       Plan:     · This patient requires inpatient admission because of Acute Congestive Failure  · Factors affecting the medical complexity of this patient include Hypoxia  · Estimated length of stay is 4 days  · Acute Congestive Heart Failure/Anasarca -- New Onset  · Echocardiogram  · Appreciate Cardiology consult  · Myoview Stress Test  · BNP q 3 days, BMP daily  · I/O  · Daily weights  · Lasix 40 mg IV bid  · Repeat EKG -- Irregular Heartbeat  · Pulmonary Embolism right lung  · Lovenox  1mg/kg bid  · Oxygen support  · Hypoxia due to CHF and PE  · Oxygen to support SPO2 to be > 93%  · Possible PE on CT -- Placed on Lovenox therapeutic dose 1mg/kg bid  · Discharge Planning--Return to home  · High risk medication monitoring: None    CORE MEASURES  DVT prophylaxis: Lovenox  Decubitus ulcer present on admission: No  CODE STATUS: FULL CODE  Nutrition Status: good   Physical therapy: NA   Old Charts reviewed: Yes  EKG Reviewed:  Yes  Advance Directive Addressed: Yes    ADAM Shelton, NP-C  6/17/2020, 9:54 AM

## 2020-06-17 NOTE — ED PROVIDER NOTES
[facility] AT [facility] MED SURG  eMERGENCY dEPARTMENT eNCOUnter      Pt Name: Woody Hudson  MRN: [id number]  [place] 1955  Date of evaluation: 6/16/2020  Provider: Sandoval Patel MD    CHIEF COMPLAINT     Chief Complaint   Patient presents with    Leg Swelling     Pt c/o swelling to bilateral legs since March. Denies chest pain Pt sent by PCP for swelling    Shortness of Breath     Pt pulsox 70% on room air. Pt does not wear oxygen. 5lpm nasal cannual applied in triage = 92%       HISTORY OF PRESENT ILLNESS    Woody Hudson is a 72 y.o. female who presents to the emergency department for evaluation of shortness of breath and leg swelling. Leg swelling has been ongoing for last several months. Symptoms have been getting gradually worse. Shortness of breath has been getting gradually worse as well. Patient states symptoms became much worse today. Patient went to see her doctor for the first time in 4 to 5 years and had oxygen saturation of 70% on room air. Patient denies any associated pain with this. She denies any chest pain, abdominal pain, nausea, vomiting. Patient does smoke daily. She is unsure of any past medical history as she has not seen a doctor in many years. PAST MEDICAL HISTORY     Past Medical History:   Diagnosis Date    A-fib [facility])     CHF (congestive heart failure) ([facility] [address]     COPD (chronic obstructive pulmonary disease) ([facility] [address]        SURGICAL HISTORY       Past Surgical History:   Procedure Laterality Date    CHOLECYSTECTOMY      HERNIA REPAIR      x2    TONSILLECTOMY         CURRENT MEDICATIONS       Current Discharge Medication List      CONTINUE these medications which have NOT CHANGED    Details   calcium citrate-vitamin d (CALCIUM CITRATE + D) 250-200 MG-UNIT TABS Take by mouth 2 times daily      Multiple Vitamins-Minerals (MULTIVITAMIN ADULT) CHEW Take by mouth daily             ALLERGIES     Patient has no known allergies. FAMILY HISTORY     History reviewed.  No pertinent family does have wheezing as well. Solu-Medrol and albuterol utilizer was given. Multiple laboratory studies were obtained. CBC shows normal WBC count. Troponin is minimally elevated to 30. D-dimer is elevated. CT chest was ordered. CT was read by the radiologist and shows findings of CHF decompensation. Subtle eccentric filling defect in subsegmental artery which may represent pulmonary embolism. On reevaluation, patient states she is much improved. Patient still requiring 2 to 3 L to stay above 90%. Patient has diuresed approximately 4 L in the emergency department. Due to requiring supplemental oxygenation and acute CHF, patient would benefit from admission and further treatment and evaluation. Patient will be started on Lovenox due to possible subsegmental PE.  PE is unlikely cause of symptoms. Patient was discussed with Dr. Karey Hawthorne, hospitalist, who did agree to admit the patient. Due to the critical nature of this patients presentation, which necessitated multiple bedside assessments, manipulation and supportive measures to prevent further life threatening deterioration, I would like to bill for a total of 35 minutes of critical care time. This was exclusive of any separately billable procedures.       ED Medications administered this visit:    Medications   sodium chloride flush 0.9 % injection 10 mL (10 mLs Intravenous Given 6/17/20 0823)   sodium chloride flush 0.9 % injection 10 mL (has no administration in time range)   acetaminophen (TYLENOL) tablet 650 mg (650 mg Oral Given 6/17/20 1450)     Or   acetaminophen (TYLENOL) suppository 650 mg ( Rectal See Alternative 6/17/20 1450)   polyethylene glycol (GLYCOLAX) packet 17 g (has no administration in time range)   promethazine (PHENERGAN) tablet 12.5 mg (has no administration in time range)     Or   ondansetron (ZOFRAN) injection 4 mg (has no administration in time range)   famotidine (PEPCID) tablet 20 mg (20 mg Oral Given 6/17/20 0821)

## 2020-06-18 PROBLEM — I50.21 ACUTE SYSTOLIC CONGESTIVE HEART FAILURE, NYHA CLASS 3 (HCC): Status: ACTIVE | Noted: 2020-06-16

## 2020-06-18 PROBLEM — J96.01 ACUTE RESPIRATORY FAILURE WITH HYPOXIA (HCC): Status: ACTIVE | Noted: 2020-06-18

## 2020-06-18 LAB
ALLEN TEST: ABNORMAL
ANION GAP SERPL CALCULATED.3IONS-SCNC: 10 MMOL/L (ref 9–17)
BUN BLDV-MCNC: 11 MG/DL (ref 8–23)
BUN/CREAT BLD: 21 (ref 9–20)
CALCIUM SERPL-MCNC: 7.1 MG/DL (ref 8.6–10.4)
CARBOXYHEMOGLOBIN: ABNORMAL % (ref 0–5)
CHLORIDE BLD-SCNC: 98 MMOL/L (ref 98–107)
CO2: 39 MMOL/L (ref 20–31)
CREAT SERPL-MCNC: 0.52 MG/DL (ref 0.5–0.9)
FIO2: 36
GFR AFRICAN AMERICAN: >60 ML/MIN
GFR NON-AFRICAN AMERICAN: >60 ML/MIN
GFR SERPL CREATININE-BSD FRML MDRD: ABNORMAL ML/MIN/{1.73_M2}
GFR SERPL CREATININE-BSD FRML MDRD: ABNORMAL ML/MIN/{1.73_M2}
GLUCOSE BLD-MCNC: 83 MG/DL (ref 70–99)
HCO3 ARTERIAL: 46.4 MMOL/L (ref 22–26)
MAGNESIUM: 1.7 MG/DL (ref 1.6–2.6)
METHEMOGLOBIN: ABNORMAL % (ref 0–1.9)
MODE: ABNORMAL
NEGATIVE BASE EXCESS, ART: ABNORMAL MMOL/L (ref 0–2)
NOTIFICATION TIME: ABNORMAL
NOTIFICATION: ABNORMAL
O2 DEVICE/FLOW/%: ABNORMAL
O2 SAT, ARTERIAL: 90.5 % (ref 95–98)
OXYHEMOGLOBIN: ABNORMAL % (ref 95–98)
PATIENT TEMP: 37
PCO2 ARTERIAL: 72.6 MMHG (ref 35–45)
PCO2, ART, TEMP ADJ: ABNORMAL (ref 35–45)
PEEP/CPAP: ABNORMAL
PH ARTERIAL: 7.42 (ref 7.35–7.45)
PH, ART, TEMP ADJ: ABNORMAL (ref 7.35–7.45)
PO2 ARTERIAL: 60.4 MMHG (ref 80–100)
PO2, ART, TEMP ADJ: ABNORMAL MMHG (ref 80–100)
POSITIVE BASE EXCESS, ART: 17.6 MMOL/L (ref 0–2)
POTASSIUM SERPL-SCNC: 3.9 MMOL/L (ref 3.7–5.3)
PSV: ABNORMAL
PT. POSITION: ABNORMAL
RESPIRATORY RATE: 20
SAMPLE SITE: ABNORMAL
SET RATE: ABNORMAL
SODIUM BLD-SCNC: 147 MMOL/L (ref 135–144)
TEXT FOR RESPIRATORY: ABNORMAL
TOTAL HB: ABNORMAL G/DL (ref 12–16)
TOTAL RATE: ABNORMAL
VT: ABNORMAL

## 2020-06-18 PROCEDURE — 6370000000 HC RX 637 (ALT 250 FOR IP): Performed by: INTERNAL MEDICINE

## 2020-06-18 PROCEDURE — 6360000002 HC RX W HCPCS: Performed by: INTERNAL MEDICINE

## 2020-06-18 PROCEDURE — 2700000000 HC OXYGEN THERAPY PER DAY

## 2020-06-18 PROCEDURE — 99233 SBSQ HOSP IP/OBS HIGH 50: CPT | Performed by: INTERNAL MEDICINE

## 2020-06-18 PROCEDURE — 84156 ASSAY OF PROTEIN URINE: CPT

## 2020-06-18 PROCEDURE — 36415 COLL VENOUS BLD VENIPUNCTURE: CPT

## 2020-06-18 PROCEDURE — 84165 PROTEIN E-PHORESIS SERUM: CPT

## 2020-06-18 PROCEDURE — 1200000000 HC SEMI PRIVATE

## 2020-06-18 PROCEDURE — 2580000003 HC RX 258: Performed by: INTERNAL MEDICINE

## 2020-06-18 PROCEDURE — 82805 BLOOD GASES W/O2 SATURATION: CPT

## 2020-06-18 PROCEDURE — 84155 ASSAY OF PROTEIN SERUM: CPT

## 2020-06-18 PROCEDURE — 94669 MECHANICAL CHEST WALL OSCILL: CPT

## 2020-06-18 PROCEDURE — 86334 IMMUNOFIX E-PHORESIS SERUM: CPT

## 2020-06-18 PROCEDURE — 84166 PROTEIN E-PHORESIS/URINE/CSF: CPT

## 2020-06-18 PROCEDURE — 83735 ASSAY OF MAGNESIUM: CPT

## 2020-06-18 PROCEDURE — 80048 BASIC METABOLIC PNL TOTAL CA: CPT

## 2020-06-18 RX ORDER — LISINOPRIL 2.5 MG/1
2.5 TABLET ORAL DAILY
Status: DISCONTINUED | OUTPATIENT
Start: 2020-06-18 | End: 2020-06-19 | Stop reason: HOSPADM

## 2020-06-18 RX ORDER — FUROSEMIDE 10 MG/ML
40 INJECTION INTRAMUSCULAR; INTRAVENOUS DAILY
Status: DISCONTINUED | OUTPATIENT
Start: 2020-06-18 | End: 2020-06-19 | Stop reason: HOSPADM

## 2020-06-18 RX ADMIN — SODIUM CHLORIDE, PRESERVATIVE FREE 10 ML: 5 INJECTION INTRAVENOUS at 21:26

## 2020-06-18 RX ADMIN — ENOXAPARIN SODIUM 90 MG: 100 INJECTION SUBCUTANEOUS at 21:25

## 2020-06-18 RX ADMIN — CARVEDILOL 3.12 MG: 3.12 TABLET, FILM COATED ORAL at 07:29

## 2020-06-18 RX ADMIN — FUROSEMIDE 40 MG: 10 INJECTION, SOLUTION INTRAMUSCULAR; INTRAVENOUS at 08:26

## 2020-06-18 RX ADMIN — LISINOPRIL 2.5 MG: 2.5 TABLET ORAL at 08:25

## 2020-06-18 RX ADMIN — ENOXAPARIN SODIUM 90 MG: 100 INJECTION SUBCUTANEOUS at 08:26

## 2020-06-18 RX ADMIN — SODIUM CHLORIDE, PRESERVATIVE FREE 10 ML: 5 INJECTION INTRAVENOUS at 08:26

## 2020-06-18 RX ADMIN — ACETAMINOPHEN 650 MG: 325 TABLET, FILM COATED ORAL at 07:28

## 2020-06-18 RX ADMIN — CARVEDILOL 3.12 MG: 3.12 TABLET, FILM COATED ORAL at 16:08

## 2020-06-18 RX ADMIN — FAMOTIDINE 20 MG: 20 TABLET, FILM COATED ORAL at 08:25

## 2020-06-18 RX ADMIN — FAMOTIDINE 20 MG: 20 TABLET, FILM COATED ORAL at 21:25

## 2020-06-18 ASSESSMENT — PAIN DESCRIPTION - LOCATION: LOCATION: HEAD

## 2020-06-18 ASSESSMENT — PAIN SCALES - GENERAL
PAINLEVEL_OUTOF10: 0
PAINLEVEL_OUTOF10: 0
PAINLEVEL_OUTOF10: 3
PAINLEVEL_OUTOF10: 3
PAINLEVEL_OUTOF10: 0

## 2020-06-18 ASSESSMENT — PAIN DESCRIPTION - FREQUENCY: FREQUENCY: CONTINUOUS

## 2020-06-18 ASSESSMENT — PAIN DESCRIPTION - DESCRIPTORS: DESCRIPTORS: HEADACHE

## 2020-06-18 ASSESSMENT — PAIN DESCRIPTION - PAIN TYPE: TYPE: ACUTE PAIN

## 2020-06-18 NOTE — PROGRESS NOTES
Progress Note    SUBJECTIVE:  FU related to  / SOB a lot better. Denies CP or palpitations. OBJECTIVE:    Vitals:   TEMPERATURE:  Current - Temp: 97.6 °F (36.4 °C);  Max - Temp  Av.9 °F (36.6 °C)  Min: 97.6 °F (36.4 °C)  Max: 98.4 °F (36.9 °C)  RESPIRATIONS RANGE: Resp  Av  Min: 18  Max: 18  PULSE RANGE: Pulse  Av.4  Min: 83  Max: 91  BLOOD PRESSURE RANGE:  Systolic (79JEO), DMV:189 , Min:93 , GGU:808   ; Diastolic (15KIA), OAB:49, Min:55, Max:90    PULSE OXIMETRY RANGE: SpO2  Av %  Min: 90 %  Max: 90 %  24HR INTAKE/OUTPUT:      Intake/Output Summary (Last 24 hours) at 2020 3083  Last data filed at 2020 4920  Gross per 24 hour   Intake 610 ml   Output 8410 ml   Net -7800 ml     -----------------------------------------------------------------  Exam:  General: A & O x3  HEENT: Supple neck & negative  Heart: Regular  Lungs: clear with no rales or wheezes  Abdomen: Normal & soft, No tenderness and BS normal  Extremities:  2+ and much better   Neuro: NonFocal     -----------------------------------------------------------------  Diagnostic Data:  Lab Results   Component Value Date    WBC 6.4 2020    HGB 14.5 2020     2020       Lab Results   Component Value Date    BUN 7 (L) 2020    CREATININE 0.51 2020     (H) 2020    K 3.5 (L) 2020    CALCIUM 7.2 (L) 2020     2020    CO2 32 (H) 2020    LABGLOM >60 2020       Lab Results   Component Value Date    WBCUA None 2020    RBCUA None 2020    EPITHUA 0 TO 2 2020    LEUKOCYTESUR NEGATIVE 2020    SPECGRAV 1.015 2020    GLUCOSEU NEGATIVE 2020    KETUA NEGATIVE 2020    PROTEINU NEGATIVE 2020    HGBUR NEGATIVE 2020    CASTUA NOT REPORTED 2020    CRYSTUA NOT REPORTED 2020    BACTERIA 1+ (A) 2020    YEAST NOT REPORTED 2020       Lab Results   Component Value Date    TROPONINT NOT REPORTED 06/16/2020    PROBNP 9,013 (H) 06/16/2020       Xr Chest Portable    Result Date: 6/16/2020  EXAMINATION: ONE XRAY VIEW OF THE CHEST 6/16/2020 1:06 pm COMPARISON: None. HISTORY: ORDERING SYSTEM PROVIDED HISTORY: SOB, leg swelling TECHNOLOGIST PROVIDED HISTORY: SOB, leg swelling FINDINGS: Chronic pulmonary changes with prominent interstitium and bilateral pleural effusions. Enlarged heart. Bibasilar airspace disease. Findings of CHF/volume overload. Ct Chest Pulmonary Embolism W Contrast    Result Date: 6/16/2020  EXAMINATION: CTA OF THE CHEST 6/16/2020 3:17 pm TECHNIQUE: CTA of the chest was performed after the administration of intravenous contrast.  Multiplanar reformatted images are provided for review. MIP images are provided for review. Dose modulation, iterative reconstruction, and/or weight based adjustment of the mA/kV was utilized to reduce the radiation dose to as low as reasonably achievable. COMPARISON: 6/16/2020 HISTORY: ORDERING SYSTEM PROVIDED HISTORY: SOB, acute CHF, elevated d diimer, negative COVID TECHNOLOGIST PROVIDED HISTORY: SOB, acute CHF, elevated d diimer, negative COVID FINDINGS: Pulmonary Arteries: Subtle filling defect right upper lobe subsegmental branch pulmonary embolism suspected no central or segmental pulmonary embolism is apparent. Mediastinum: Enlarged heart. Trace pericardial effusion. Haziness of the mediastinal fat likely from fluid overload state. No enlarged lymph nodes. Lungs/pleura: Trace bilateral effusions with associated bibasilar airspace disease. Prominence of the pulmonary interstitium. Vague ground-glass opacities. Findings compatible with volume overload. Scattered areas of atelectasis. Upper Abdomen: No acute upper abdominal findings. Fatty liver. Postsurgical changes of the stomach. Soft Tissues/Bones: Body wall edema. No acute osseous abnormality. Imaging findings of CHF decompensation.  Subtle eccentric filling defect in right upper lobe femoral, popliteal, tibials,  peroneal, and saphenous veins were   peroneal, and saphenous veins were  evaluated. evaluated. Poor resolution of the distal        Poor resolution of the distal  femoral, anterior and posterior      femoral, anterior and posterior  tibial, and peroneal veins. tibial, and peroneal veins. A few gastroc/soleal veins were      A few gastroc/soleal veins were  visualized and compressible but      visualized and compressible but  mostly with poor resolution. mostly with poor resolution. All other veins were compressible. All other veins were compressible. Deep venous doppler signals were     Deep venous doppler signals were  spontaneous, augmented, and phasic   spontaneous, augmented, and phasic  but with a pulsatile component. but with a pulsatile component. Edema was noted from the ankle to    Edema was noted from the ankle to the  the proximal thigh.                  proximal thigh. Velocities are measured in cm/s ; Diameters are measured in cm Right Lower Extremities DVT Study Measurements Right 2D Measurements +------------------------------------+----------+---------------+----------+ ! Location                            ! Visualized! Compressibility! Thrombosis! +------------------------------------+----------+---------------+----------+ ! Common Femoral                      !Yes       ! Yes            ! None      ! +------------------------------------+----------+---------------+----------+ ! Prox Femoral                        !Yes       ! Yes            ! None      ! +------------------------------------+----------+---------------+----------+ ! Mid Femoral                         !Yes       ! Yes            ! None      ! +------------------------------------+----------+---------------+----------+ ! Dist Femoral                        !No        !               !          ! ! +---------------------------+------+------+--------------------------------+ ! Prox Femoral               !Phasic!      !                                ! +---------------------------+------+------+--------------------------------+ ! Popliteal                  !Phasic!      !                                ! +---------------------------+------+------+--------------------------------+ Left Lower Extremities DVT Study Measurements Left 2D Measurements +------------------------------------+----------+---------------+----------+ ! Location                            ! Visualized! Compressibility! Thrombosis! +------------------------------------+----------+---------------+----------+ ! Common Femoral                      !Yes       ! Yes            ! None      ! +------------------------------------+----------+---------------+----------+ ! Prox Femoral                        !Yes       ! Yes            ! None      ! +------------------------------------+----------+---------------+----------+ ! Mid Femoral                         !Yes       ! Yes            ! None      ! +------------------------------------+----------+---------------+----------+ ! Dist Femoral                        !Yes       !               !          ! +------------------------------------+----------+---------------+----------+ ! Deep Femoral                        !Yes       ! Yes            ! None      ! +------------------------------------+----------+---------------+----------+ ! Popliteal                           !Yes       ! Yes            ! None      ! +------------------------------------+----------+---------------+----------+ ! Sapheno Femoral Junction            ! Yes       ! Yes            ! None      ! +------------------------------------+----------+---------------+----------+ ! PTV                                 ! No        !               !          ! +------------------------------------+----------+---------------+----------+ ! Cyrus resolved hospital problems. *      Patient Active Problem List    Diagnosis Date Noted    Acute respiratory failure with hypoxia ([facility] [address] from CHF 06/18/2020    Anasarca 06/17/2020    Acute pulmonary embolism ([facility] [address] ? / Normal BLE Venous Ultrasound 2020 / Suspect Radiology Overread 85/59/5740    Acute systolic congestive heart failure, NYHA class 3 ([facility] [address] / Echo 25% 2020 06/16/2020       PLAN:  · Decrease lasix 40 daily  · Decrease ACE inhibitor due to lower BP  · Critical Care Time: zero  · Cardiology. .. Vernestine Ket Vernestine Ket cath planned. · Lengthy discussion about patient CHF.        Gagan Burnham M.D.

## 2020-06-18 NOTE — CONSULTS
PALLIATIVE CARE  Asked to talk with patient regarding the questions she has about a new DNRCC-A order. In to see patient with  Artist Kocher. Pt immediately is asking questions regarding code status. Her  is present and she is wanting to have him help her decide what she should do. Discussion with patient and  regarding wishes for care at end of life. ACP facilitator questions completed, which indicates that she would not desire a ventilator or have CPR. We discuss that she would still receive active treatment up until and if her heart stops or she stops breathing. Marlene Sarabia states understanding and is agreeable to the [facility]. Discussed that the DNR order will follow her after discharge, but that she can change her mind about her wishes at any time.  had brought in a copy of her advance directives from home. It is determined that they need updated due to changes in address and phone numbers. She agrees to update documents and this is completed. Copy to pt paper chart and original and copy given to patient. Discussed with pt that I would return to continue to discuss her chronic diseases, etc after lunch. Upon my return, pt is sleeping and is permitted to rest.    Marlene Sarabia is wearing high flow O2 at this time. Admitted with CHF with EF of 20% along with anasarca. Palliative Care to follow up tomorrow. Reina Persaud RN, Ingrid Braden and Bonilla Harvey Nurse Coordinator  6/18/2020 1:44 PM

## 2020-06-19 ENCOUNTER — HOSPITAL ENCOUNTER (INPATIENT)
Age: 65
LOS: 6 days | Discharge: HOME | DRG: 286 | End: 2020-06-25
Attending: INTERNAL MEDICINE | Admitting: OPHTHALMOLOGY
Payer: MEDICARE

## 2020-06-19 VITALS
SYSTOLIC BLOOD PRESSURE: 118 MMHG | WEIGHT: 172.62 LBS | DIASTOLIC BLOOD PRESSURE: 70 MMHG | RESPIRATION RATE: 18 BRPM | HEART RATE: 89 BPM | TEMPERATURE: 97.8 F | BODY MASS INDEX: 28.76 KG/M2 | OXYGEN SATURATION: 90 % | HEIGHT: 65 IN

## 2020-06-19 PROBLEM — J96.01 ACUTE RESPIRATORY FAILURE WITH HYPOXIA (HCC): Status: RESOLVED | Noted: 2020-06-18 | Resolved: 2020-06-19

## 2020-06-19 PROBLEM — J96.02 ACUTE RESPIRATORY FAILURE WITH HYPOXIA AND HYPERCAPNIA (HCC): Status: RESOLVED | Noted: 2020-06-18 | Resolved: 2020-06-19

## 2020-06-19 PROBLEM — I50.21 CHF NYHA CLASS II, ACUTE, SYSTOLIC (HCC): Status: ACTIVE | Noted: 2020-06-19

## 2020-06-19 PROBLEM — I50.41: Status: ACTIVE | Noted: 2020-06-19

## 2020-06-19 PROBLEM — I50.21 ACUTE SYSTOLIC CONGESTIVE HEART FAILURE, NYHA CLASS 3 (HCC): Status: RESOLVED | Noted: 2020-06-16 | Resolved: 2020-06-19

## 2020-06-19 LAB
ANION GAP SERPL CALCULATED.3IONS-SCNC: 13 MMOL/L (ref 9–17)
ANION GAP SERPL CALCULATED.3IONS-SCNC: 7 MEQ/L (ref 8–16)
BACTERIA: ABNORMAL /HPF
BILIRUBIN URINE: NEGATIVE
BLOOD, URINE: ABNORMAL
BUN BLDV-MCNC: 13 MG/DL (ref 7–22)
BUN BLDV-MCNC: 14 MG/DL (ref 8–23)
BUN/CREAT BLD: 35 (ref 9–20)
CALCIUM SERPL-MCNC: 7.1 MG/DL (ref 8.5–10.5)
CALCIUM SERPL-MCNC: 7.1 MG/DL (ref 8.6–10.4)
CASTS 2: ABNORMAL /LPF
CASTS UA: ABNORMAL /LPF
CHARACTER, URINE: CLEAR
CHLORIDE BLD-SCNC: 91 MEQ/L (ref 98–111)
CHLORIDE BLD-SCNC: 96 MMOL/L (ref 98–107)
CO2: 39 MMOL/L (ref 20–31)
CO2: 46 MEQ/L (ref 23–33)
COLOR: YELLOW
CREAT SERPL-MCNC: 0.4 MG/DL (ref 0.4–1.2)
CREAT SERPL-MCNC: 0.4 MG/DL (ref 0.5–0.9)
CRYSTALS, UA: ABNORMAL
EPITHELIAL CELLS, UA: ABNORMAL /HPF
GFR AFRICAN AMERICAN: >60 ML/MIN
GFR NON-AFRICAN AMERICAN: >60 ML/MIN
GFR SERPL CREATININE-BSD FRML MDRD: > 90 ML/MIN/1.73M2
GFR SERPL CREATININE-BSD FRML MDRD: ABNORMAL ML/MIN/{1.73_M2}
GFR SERPL CREATININE-BSD FRML MDRD: ABNORMAL ML/MIN/{1.73_M2}
GLUCOSE BLD-MCNC: 107 MG/DL (ref 70–99)
GLUCOSE BLD-MCNC: 138 MG/DL (ref 70–108)
GLUCOSE URINE: NEGATIVE MG/DL
KETONES, URINE: NEGATIVE
LEUKOCYTE ESTERASE, URINE: ABNORMAL
MAGNESIUM: 1.6 MG/DL (ref 1.6–2.4)
MAGNESIUM: 1.6 MG/DL (ref 1.6–2.6)
MISCELLANEOUS 2: ABNORMAL
NITRITE, URINE: NEGATIVE
P E INTERPRETATION, U: NORMAL
PATHOLOGIST: NORMAL
PH UA: 8.5 (ref 5–9)
POTASSIUM REFLEX MAGNESIUM: 3.1 MEQ/L (ref 3.5–5.2)
POTASSIUM SERPL-SCNC: 3.3 MMOL/L (ref 3.7–5.3)
PROTEIN UA: NEGATIVE
RBC URINE: ABNORMAL /HPF
RENAL EPITHELIAL, UA: ABNORMAL
SODIUM BLD-SCNC: 144 MEQ/L (ref 135–145)
SODIUM BLD-SCNC: 148 MMOL/L (ref 135–144)
SPECIFIC GRAVITY, URINE: 1.02 (ref 1–1.03)
SPECIMEN TYPE: NORMAL
URINE TOTAL PROTEIN: 76 MG/DL
UROBILINOGEN, URINE: 4 EU/DL (ref 0–1)
WBC UA: ABNORMAL /HPF
YEAST: ABNORMAL

## 2020-06-19 PROCEDURE — 83735 ASSAY OF MAGNESIUM: CPT

## 2020-06-19 PROCEDURE — 6370000000 HC RX 637 (ALT 250 FOR IP): Performed by: INTERNAL MEDICINE

## 2020-06-19 PROCEDURE — 6370000000 HC RX 637 (ALT 250 FOR IP): Performed by: OPHTHALMOLOGY

## 2020-06-19 PROCEDURE — 99222 1ST HOSP IP/OBS MODERATE 55: CPT | Performed by: OPHTHALMOLOGY

## 2020-06-19 PROCEDURE — 94761 N-INVAS EAR/PLS OXIMETRY MLT: CPT

## 2020-06-19 PROCEDURE — 99233 SBSQ HOSP IP/OBS HIGH 50: CPT | Performed by: INTERNAL MEDICINE

## 2020-06-19 PROCEDURE — 94640 AIRWAY INHALATION TREATMENT: CPT

## 2020-06-19 PROCEDURE — 87086 URINE CULTURE/COLONY COUNT: CPT

## 2020-06-19 PROCEDURE — 36415 COLL VENOUS BLD VENIPUNCTURE: CPT

## 2020-06-19 PROCEDURE — 94669 MECHANICAL CHEST WALL OSCILL: CPT

## 2020-06-19 PROCEDURE — 6360000002 HC RX W HCPCS: Performed by: INTERNAL MEDICINE

## 2020-06-19 PROCEDURE — 80048 BASIC METABOLIC PNL TOTAL CA: CPT

## 2020-06-19 PROCEDURE — 99223 1ST HOSP IP/OBS HIGH 75: CPT | Performed by: INTERNAL MEDICINE

## 2020-06-19 PROCEDURE — 2700000000 HC OXYGEN THERAPY PER DAY

## 2020-06-19 PROCEDURE — 6370000000 HC RX 637 (ALT 250 FOR IP): Performed by: PHYSICIAN ASSISTANT

## 2020-06-19 PROCEDURE — 81001 URINALYSIS AUTO W/SCOPE: CPT

## 2020-06-19 PROCEDURE — 2580000003 HC RX 258: Performed by: INTERNAL MEDICINE

## 2020-06-19 PROCEDURE — 2580000003 HC RX 258: Performed by: OPHTHALMOLOGY

## 2020-06-19 PROCEDURE — 51702 INSERT TEMP BLADDER CATH: CPT

## 2020-06-19 PROCEDURE — 2140000000 HC CCU INTERMEDIATE R&B

## 2020-06-19 RX ORDER — ACETAMINOPHEN 325 MG/1
650 TABLET ORAL EVERY 4 HOURS PRN
Status: DISCONTINUED | OUTPATIENT
Start: 2020-06-19 | End: 2020-06-25 | Stop reason: HOSPADM

## 2020-06-19 RX ORDER — FUROSEMIDE 40 MG/1
40 TABLET ORAL DAILY
Qty: 60 TABLET | Refills: 3 | Status: ON HOLD | OUTPATIENT
Start: 2020-06-19 | End: 2020-06-25 | Stop reason: HOSPADM

## 2020-06-19 RX ORDER — PANTOPRAZOLE SODIUM 40 MG/1
40 TABLET, DELAYED RELEASE ORAL
Status: DISCONTINUED | OUTPATIENT
Start: 2020-06-19 | End: 2020-06-25 | Stop reason: HOSPADM

## 2020-06-19 RX ORDER — CARVEDILOL 3.12 MG/1
3.12 TABLET ORAL 2 TIMES DAILY WITH MEALS
Status: DISCONTINUED | OUTPATIENT
Start: 2020-06-19 | End: 2020-06-25

## 2020-06-19 RX ORDER — FUROSEMIDE 10 MG/ML
40 INJECTION INTRAMUSCULAR; INTRAVENOUS DAILY
Status: DISCONTINUED | OUTPATIENT
Start: 2020-06-20 | End: 2020-06-19

## 2020-06-19 RX ORDER — CARVEDILOL 3.12 MG/1
3.12 TABLET ORAL 2 TIMES DAILY WITH MEALS
Qty: 60 TABLET | Refills: 3 | Status: ON HOLD | OUTPATIENT
Start: 2020-06-19 | End: 2020-06-25 | Stop reason: HOSPADM

## 2020-06-19 RX ORDER — POTASSIUM CHLORIDE 7.45 MG/ML
10 INJECTION INTRAVENOUS PRN
Status: DISCONTINUED | OUTPATIENT
Start: 2020-06-19 | End: 2020-06-25 | Stop reason: HOSPADM

## 2020-06-19 RX ORDER — FAMOTIDINE 20 MG/1
20 TABLET, FILM COATED ORAL 2 TIMES DAILY
Status: DISCONTINUED | OUTPATIENT
Start: 2020-06-19 | End: 2020-06-19 | Stop reason: CLARIF

## 2020-06-19 RX ORDER — ONDANSETRON 2 MG/ML
4 INJECTION INTRAMUSCULAR; INTRAVENOUS EVERY 6 HOURS PRN
Status: DISCONTINUED | OUTPATIENT
Start: 2020-06-19 | End: 2020-06-25 | Stop reason: HOSPADM

## 2020-06-19 RX ORDER — POTASSIUM CHLORIDE 20 MEQ/1
40 TABLET, EXTENDED RELEASE ORAL PRN
Status: DISCONTINUED | OUTPATIENT
Start: 2020-06-19 | End: 2020-06-25 | Stop reason: HOSPADM

## 2020-06-19 RX ORDER — METHYLPREDNISOLONE SODIUM SUCCINATE 40 MG/ML
40 INJECTION, POWDER, LYOPHILIZED, FOR SOLUTION INTRAMUSCULAR; INTRAVENOUS EVERY 12 HOURS
Status: DISCONTINUED | OUTPATIENT
Start: 2020-06-19 | End: 2020-06-23

## 2020-06-19 RX ORDER — LISINOPRIL 2.5 MG/1
2.5 TABLET ORAL DAILY
Status: DISCONTINUED | OUTPATIENT
Start: 2020-06-20 | End: 2020-06-25 | Stop reason: HOSPADM

## 2020-06-19 RX ORDER — LISINOPRIL 2.5 MG/1
2.5 TABLET ORAL DAILY
Qty: 30 TABLET | Refills: 3 | Status: ON HOLD | OUTPATIENT
Start: 2020-06-20 | End: 2020-06-25 | Stop reason: HOSPADM

## 2020-06-19 RX ORDER — IPRATROPIUM BROMIDE AND ALBUTEROL SULFATE 2.5; .5 MG/3ML; MG/3ML
1 SOLUTION RESPIRATORY (INHALATION)
Status: DISCONTINUED | OUTPATIENT
Start: 2020-06-19 | End: 2020-06-23

## 2020-06-19 RX ORDER — SODIUM CHLORIDE 0.9 % (FLUSH) 0.9 %
10 SYRINGE (ML) INJECTION EVERY 12 HOURS SCHEDULED
Status: DISCONTINUED | OUTPATIENT
Start: 2020-06-19 | End: 2020-06-23 | Stop reason: SDUPTHER

## 2020-06-19 RX ADMIN — IPRATROPIUM BROMIDE AND ALBUTEROL SULFATE 1 AMPULE: .5; 3 SOLUTION RESPIRATORY (INHALATION) at 19:54

## 2020-06-19 RX ADMIN — POTASSIUM CHLORIDE 40 MEQ: 1500 TABLET, EXTENDED RELEASE ORAL at 21:13

## 2020-06-19 RX ADMIN — SODIUM CHLORIDE, PRESERVATIVE FREE 10 ML: 5 INJECTION INTRAVENOUS at 08:17

## 2020-06-19 RX ADMIN — ACETAMINOPHEN 650 MG: 325 TABLET ORAL at 19:43

## 2020-06-19 RX ADMIN — FUROSEMIDE 5 MG/HR: 10 INJECTION, SOLUTION INTRAMUSCULAR; INTRAVENOUS at 18:56

## 2020-06-19 RX ADMIN — Medication 10 ML: at 20:48

## 2020-06-19 RX ADMIN — LISINOPRIL 2.5 MG: 2.5 TABLET ORAL at 08:17

## 2020-06-19 RX ADMIN — FUROSEMIDE 40 MG: 10 INJECTION, SOLUTION INTRAMUSCULAR; INTRAVENOUS at 08:17

## 2020-06-19 RX ADMIN — CARVEDILOL 3.12 MG: 3.12 TABLET, FILM COATED ORAL at 20:48

## 2020-06-19 RX ADMIN — FAMOTIDINE 20 MG: 20 TABLET, FILM COATED ORAL at 08:17

## 2020-06-19 RX ADMIN — CARVEDILOL 3.12 MG: 3.12 TABLET, FILM COATED ORAL at 08:18

## 2020-06-19 RX ADMIN — ENOXAPARIN SODIUM 80 MG: 80 INJECTION SUBCUTANEOUS at 08:18

## 2020-06-19 RX ADMIN — METHYLPREDNISOLONE SODIUM SUCCINATE 40 MG: 40 INJECTION, POWDER, FOR SOLUTION INTRAMUSCULAR; INTRAVENOUS at 20:47

## 2020-06-19 ASSESSMENT — PAIN SCALES - GENERAL
PAINLEVEL_OUTOF10: 1
PAINLEVEL_OUTOF10: 0
PAINLEVEL_OUTOF10: 6
PAINLEVEL_OUTOF10: 0

## 2020-06-19 NOTE — DISCHARGE SUMMARY
suspicious of small subsegmental pulmonary embolism. Critical results were called by Dr. Page Bamberger to [facility] on 6/16/2020 at 15:38. Vl Dup Lower Extremity Venous Bilateral    Result Date: 6/17/2020    [facility]  Vascular Lower Extremities DVT Study Procedure   Patient Name  Carli May Date of Study           06/17/2020                L   Date of Birth 1955   Gender                  Female   Age           72 year(s)   Race                       Room Number   0328         Height:                 65 inch, 165.1 cm   Corporate ID  [id number]     Weight:                 197 pounds, 89.4 kg  #   Patient Acct  [de-identified]    BSA:        1.97 m^2    BMI:      32.78 kg/m^2  #   MR #          [id number]       Sonographer             DARI Amador   Accession #   [id number]  Interpreting Physician  Giuliano Ruano MD   Referring                  Referring Physician     Abi Gibbons  Nurse  Practitioner  Procedure Type of Study:   Veins: Lower Extremities DVT Study, Venous Scan Lower Bilateral.  Patient Status: In Patient. Comments:INDICATIONS: Possible pulmonary emblolism  Conclusions   Summary   No evidence of superficial or deep venous thrombosis in both lower  extremities. Technically limited visualization. Calf edema bilaterally.    Signature   ----------------------------------------------------------------  Electronically signed by DARI Rivera(Sonographer) on  06/17/2020 09:34 AM  ----------------------------------------------------------------   ----------------------------------------------------------------  Electronically signed by Giuliano Ruano MD(Interpreting  physician) on 06/17/2020 02:38 PM  ----------------------------------------------------------------  Findings:   Right Impression:                    Left Impression:  The common femoral, femoral, deep    The common femoral, femoral, deep  femoral, popliteal, tibials,         femoral, popliteal, tibials,  peroneal, and saphenous veins were   peroneal, and saphenous veins were  evaluated. evaluated. Poor resolution of the distal        Poor resolution of the distal  femoral, anterior and posterior      femoral, anterior and posterior  tibial, and peroneal veins. tibial, and peroneal veins. A few gastroc/soleal veins were      A few gastroc/soleal veins were  visualized and compressible but      visualized and compressible but  mostly with poor resolution. mostly with poor resolution. All other veins were compressible. All other veins were compressible. Deep venous doppler signals were     Deep venous doppler signals were  spontaneous, augmented, and phasic   spontaneous, augmented, and phasic  but with a pulsatile component. but with a pulsatile component. Edema was noted from the ankle to    Edema was noted from the ankle to the  the proximal thigh.                  proximal thigh. Velocities are measured in cm/s ; Diameters are measured in cm Right Lower Extremities DVT Study Measurements Right 2D Measurements +------------------------------------+----------+---------------+----------+ ! Location                            ! Visualized! Compressibility! Thrombosis! +------------------------------------+----------+---------------+----------+ ! Common Femoral                      !Yes       ! Yes            ! None      ! +------------------------------------+----------+---------------+----------+ ! Prox Femoral                        !Yes       ! Yes            ! None      ! +------------------------------------+----------+---------------+----------+ ! Mid Femoral                         !Yes       ! Yes            ! None      ! +------------------------------------+----------+---------------+----------+ ! Dist Femoral                        !No        !               !          ! +------------------------------------+----------+---------------+----------+ ! Deep Femoral !Phasic!      !                                ! +---------------------------+------+------+--------------------------------+ ! Popliteal                  !Phasic!      !                                ! +---------------------------+------+------+--------------------------------+ Left Lower Extremities DVT Study Measurements Left 2D Measurements +------------------------------------+----------+---------------+----------+ ! Location                            ! Visualized! Compressibility! Thrombosis! +------------------------------------+----------+---------------+----------+ ! Common Femoral                      !Yes       ! Yes            ! None      ! +------------------------------------+----------+---------------+----------+ ! Prox Femoral                        !Yes       ! Yes            ! None      ! +------------------------------------+----------+---------------+----------+ ! Mid Femoral                         !Yes       ! Yes            ! None      ! +------------------------------------+----------+---------------+----------+ ! Dist Femoral                        !Yes       !               !          ! +------------------------------------+----------+---------------+----------+ ! Deep Femoral                        !Yes       ! Yes            ! None      ! +------------------------------------+----------+---------------+----------+ ! Popliteal                           !Yes       ! Yes            ! None      ! +------------------------------------+----------+---------------+----------+ ! Sapheno Femoral Junction            ! Yes       ! Yes            ! None      ! +------------------------------------+----------+---------------+----------+ ! PTV                                 ! No        !               !          ! +------------------------------------+----------+---------------+----------+ ! Cyrus                            !No        !               !          ! Discharge Medications:       Durga Husain   Home Medication Instructions HOC:[id number]    Printed on:06/19/20 0932   Medication Information                      calcium citrate-vitamin d (CALCIUM CITRATE + D) 250-200 MG-UNIT TABS  Take by mouth 2 times daily             carvedilol (COREG) 3.125 MG tablet  Take 1 tablet by mouth 2 times daily (with meals)             furosemide (LASIX) 40 MG tablet  Take 1 tablet by mouth daily             lisinopril (PRINIVIL;ZESTRIL) 2.5 MG tablet  Take 1 tablet by mouth daily             Multiple Vitamins-Minerals (MULTIVITAMIN ADULT) CHEW  Take by mouth daily             Pediatric Multivitamins-Iron (FLINTSTONES COMPLETE PO)  Take 1 tablet by mouth 2 times daily             Vitamins A & D (VITAMIN A & D PO)  Take 1 capsule by mouth 3 times daily                 Patient Instructions:    Activity: activity as tolerated  Diet: cardiac diet  Wound Care: none needed  Other: None     Disposition:   Transfer to [facility] for tertiary care    Follow up:  Patient will be followed by Regan Rodgers in 1-2 weeks    CORE MEASURES on Discharge (if applicable)  ACE/ARB in CHF: Yes  Statin in MI: NA  ASA in MI: NA  Statin in CVA: NA  Antiplatelet in CVA: NA    Total time spent on discharge services: 45 minutes    Including the following activities:  Evaluation and Management of patient  Discussion with patient and/or surrogate about current care plan  Coordination with Case Management and/or   Coordination of care with Consultants (if applicable)   Coordination of care with Receiving Facility Physician (if applicable)  Completion of DME forms (if applicable)  Preparation of Discharge Summary  Preparation of Medication Reconciliation  Preparation of Discharge Prescriptions    Signed:  ADAM Alonzo, NP-C  6/19/2020, 9:32 AM

## 2020-06-19 NOTE — H&P
Ht 5' 5\" (1.651 m)   Wt 165 lb 8 oz (75.1 kg)   SpO2 94%   BMI 27.54 kg/m²   General appearance:  No apparent distress, appears stated age and cooperative. HEENT:  Normal cephalic, atraumatic without obvious deformity. Pupils equal, round, and reactive to light. Extra ocular muscles intact. Conjunctivae/corneas clear. Neck: Supple, with full range of motion. No jugular venous distention. Trachea midline. Respiratory:  Normal respiratory effort. Decreased BS at bases bilaterally  without Rales/Wheezes/Rhonchi. Cardiovascular:  irregular rate and irrregular  hythm with normal S1/S2 without murmurs, rubs or gallops. Abdomen: Soft, non-tender, non-distended with normal bowel sounds. Musculoskeletal:  No clubbing, cyanosis. 2+  edema bilaterally. Full range of motion without deformity. Skin: Skin color, texture, turgor normal.  No rashes or lesions. Neurologic:  Neurovascularly intact without any focal sensory/motor deficits. grossly non-focal.  Psychiatric:  Alert and oriented, thought content appropriate, normal insight  Capillary Refill: Brisk,< 3 seconds   Peripheral Pulses: +2 palpable, equal bilaterally     Review of Labs and Diagnostic Testing:  Labs:     No results for input(s): WBC, HGB, HCT, PLT in the last 72 hours. Recent Labs     06/17/20  0633 06/18/20  0550 06/19/20  0728   * 147* 148*   K 3.5* 3.9 3.3*    98 96*   CO2 32* 39* 39*   BUN 7* 11 14   CREATININE 0.51 0.52 0.40*   CALCIUM 7.2* 7.1* 7.1*     No results for input(s): AST, ALT, BILIDIR, BILITOT, ALKPHOS in the last 72 hours. No results found for: AMYLASE  No results for input(s): INR in the last 72 hours. No results for input(s): TROPONINT in the last 72 hours. No results for input(s): BNP in the last 72 hours. No results for input(s): LACTA in the last 72 hours.   No results found for: PROCAL  No results found for: LABA1C  Lab Results   Component Value Date    TSH 0.75 06/16/2020       Urinalysis:   Lab Results

## 2020-06-19 NOTE — PROGRESS NOTES
Pili Max am scribing for and in the presence of Miriam Mills MD, F.A.C.C..    Patient: Alejandra Maya  : [id number]  Date of Admission: 2020  Primary Care Physician: Amaury Gunter  Today's Date: 2020    REASON FOR CONSULTATION: Leg Swelling (Pt c/o swelling to bilateral legs since March. Denies chest pain Pt sent by PCP for swelling) and Shortness of Breath (Pt pulsox 70% on room air. Pt does not wear oxygen. 5lpm nasal cannual applied in triage = 92%)    HPI: Ms. Ruth El is a 72 y.o. female who was admitted to the hospital with shortness of breath for the past 3 months. Currently treated for acute on chronic congestive heart failure leading to this consultation. Patient reported having history of atrial fibrillation. No prior history of myocardial infarction, heart failure or significant arrhythmia. No prior cardiac catheterization. She denied having family history of premature CAD. She is a longtime smoker, used to smoke 1 pack/day for over 20 years but recently gotten back to almost half a pack per day. She is not aware of any history of hypertension, diabetes or dyslipidemia. She used to work at [employer] and retired 2 years ago. [place]  but recently she is just staying home particularly after the covered 19 pandemic restrictions started. Patient found to have severe cardiomyopathy. Probably nonischemic cardiomyopathy based on the echo finding and the stress test but cannot rule out a small degree of myocardial ischemia. She started on Lasix 40 mg IV twice daily in addition to low-dose carvedilol and lisinopril and tolerating these medicines very well. She has amazing urine output with negative fluid balance of 19 L in 3 days. .    Ms. Ruth El is feeling better today. Denies chest pain, pressure or tightness. No fever or cough. No chest pain. No nausea, vomiting or change of bowel habits.   Her lower extremity edema is much better but unfortunately her breathing stays the same. She is maintained on high flow oxygen. CT of the chest on admission showed possible subsegmental pulmonary embolism. She has lost 17 pounds since admission. I had a very long discussion with the patient regarding proper management of her acute on chronic combined CHF. Her echocardiographic findings are impressive for the severe global hypokinesis and severely reduced left ventricular systolic function. There is an increased echogenicity with mild to moderate LVH. Although the picture is not very impressive for possible cardiac amyloidosis this is high in the differential diagnosis giving her relatively recent stress test.  I told her she will need right and left heart catheterization. Possible cardiac MRI or technetium pyrophosphate study to rule out cardiac amyloidosis. I explained to her that this is not available in this facility and it is better for her to be transferred to a tertiary care center where they can do these tests. Patient is agreeable and I contacted Dr. Gilma Sierra to see her for cardiac consultation in [facility]. Past Medical History:   Diagnosis Date    A-fib [facility])     [facility]congestive heart failure) ([facility])     COPD (chronic obstructive pulmonary disease) ([facility])        CURRENT ALLERGIES: Patient has no known allergies. REVIEW OF SYSTEMS: 14 systems were reviewed. Pertinent positives and negatives as above, all else negative.      Past Surgical History:   Procedure Laterality Date    CHOLECYSTECTOMY      HERNIA REPAIR      x2    TONSILLECTOMY      Social History:  Social History     Tobacco Use    Smoking status: Current Every Day Smoker     Packs/day: 0.25     Years: 20.00     Pack years: 5.00     Types: Cigarettes    Smokeless tobacco: Current User   Substance Use Topics    Alcohol use: Not Currently    Drug use: Never        CURRENT MEDICATIONS:  Outpatient Medications Marked as Taking for the 6/16/20 encounter [facility] right and left heart catheterization, cardiac MRI or technetium pyrophosphate study. I told her that we do not have this in our facility and I prefer her being transferred to a tertiary care center where the studies can be done as an inpatient particularly because patient breathing is not getting any better despite excellent response to diuretics. She denies any long history of alcohol drinking. No family history of cardiomyopathy. No illicit drug use. Medical management of acute on chronic combined CHF, NYHA class IV  Continue Coreg 3.25 mg twice daily. Continue lisinopril to 10 mg daily. Continue Lasix to 40 mg  daily intravenously. Fluid restriction, low-salt diet and strict input/output. Daily weight. She will benefit from compression stockings if she can tolerate them. Follow-up serum and urine protein electrophoresis study. Discussed with Dr. Savita Shahid. He will see her for consultation and he will take care of the right and left heart cath possibly Monday. Questionable subsegmental pulmonary embolism:  Currently on therapeutic dose of Lovenox. Problem is that she still hypoxic despite high flow oxygen even with excellent diuresis. She has lost 17 pounds since admission. Prognosis: Poor unless if her find a reversible cause of her cardiomyopathy      Once again, thank you for allowing me to participate in this patients care. Please do not hesitate to contact me could I be of further assistance. Sincerely,  Candie Nieves MD, MS, F.A.C.C. [facility]) [facility], [address] [place] [address]  Phone: [phone], Fax: [phone]    Based on the patients current medical conditions, I believe the risk of significant morbidity and mortality is: high    The documentation recorded by the scribe, accurately and completely reflects the services I personally performed and the decisions made by me. Candie Nieves MD, F.A.C.C.  June 19, 2020

## 2020-06-19 NOTE — PROGRESS NOTES
Dr. Kang Chillum on the floor rounding and stated that he would like patient started on a Bumex gtt 1 mg/hr and to insert a mulligan catheter. Orders placed in Epic at this time.

## 2020-06-19 NOTE — PROGRESS NOTES
Telephone call received from Access RN at this time, patient will be going to [address]B [address] at [address] [place] [address].

## 2020-06-19 NOTE — CONSULTS
[facility]      Patient - Letty Galicia   MRN -  [id number]   Acct # - [de-identified]   - 1955      Date of Admission -  2020  4:04 PM  Date of evaluation -  2020  Room - 3B-/[facility]   Hospital Day - [address], MD Primary Care Physician - Tiffanie Gunter     Problem List      Active Hospital Problems    Diagnosis Date Noted    Acute combined systolic and diastolic congestive heart failure, NYHA class 4 ([address] [I50.41] 2020    Acute pulmonary embolism ([address] ? / Normal BLE Venous Ultrasound 2020 / Suspect Radiology Overread [I26.99] 2020    Anasarca [R60.1] 2020     Reason for Consult    For management of hypoxia/Hypoxic respiratory failure. On Hi Flow. HPI   History Obtained From: Patient and electronic medical record. Letty Galicia is a 72 y.o. female  was initially admitted under hospitalist service. Pulmonary medicine was consulted for further management of hypoxia/Hypoxic respiratory failure. She is currently on Hi Flow. The patient is a 72 y.o. female admitted to [address] [place] [address] with SOB. She was transferred from [facility]. She was admitted on 2020 for CHF exacerbation and pulmonary edema. She reported that she is having worsening SOB and LE edema for the 3 months. She could not lay down in her bed she was sleeping in chair prior to her admission. She was sating 70% on RA in ER  She was followed by cardiology during her admission (Dr. Jose Camarillo). Echo showed EF of 30-35 %with severe global hypokinesis. Biatrial enlargement noted and moderate to severe diastolic dysfunction. She had stress test done which showed: a moderate perfusion defect of moderate intensity in the anteroseptal, lateral, and apical region(s) during stress imaging which is most consistent with but may be  due to artifact. EF was 26%). She lost 27 pounds in her admission. CT scan was with ?  PE: she was placed on daily  Pediatric Multivitamins-Iron (FLINTSTONES COMPLETE PO), Take 1 tablet by mouth 2 times daily  Vitamins A & D (VITAMIN A & D PO), Take 1 capsule by mouth 3 times daily  calcium citrate-vitamin d (CALCIUM CITRATE + D) 250-200 MG-UNIT TABS, Take by mouth 2 times daily  Multiple Vitamins-Minerals (MULTIVITAMIN ADULT) CHEW, Take by mouth daily  Diet    DIET CARDIAC; Allergies    Patient has no known allergies. Family History    History reviewed. No pertinent family history.   Sleep History    Never diagnosed with sleep apnea in the past    Social History     Social History     Socioeconomic History    Marital status:      Spouse name: Not on file    Number of children: 3    Years of education: Not on file    Highest education level: Not on file   Occupational History    Not on file   Social Needs    Financial resource strain: Not on file    Food insecurity     Worry: Not on file     Inability: Not on file   [employer] needs     Medical: Not on file     Non-medical: Not on file   Tobacco Use    Smoking status: Current Every Day Smoker     Packs/day: 0.25     Years: 20.00     Pack years: 5.00     Types: Cigarettes    Smokeless tobacco: Current User   Substance and Sexual Activity    Alcohol use: Not Currently    Drug use: Never    Sexual activity: Not on file   Lifestyle    Physical activity     Days per week: Not on file     Minutes per session: Not on file    Stress: Not on file   Relationships    Social connections     Talks on phone: Not on file     Gets together: Not on file     Attends [employer] service: Not on file     Active member of club or organization: Not on file     Attends meetings of clubs or organizations: Not on file     Relationship status: Not on file    Intimate partner violence     Fear of current or ex partner: Not on file     Emotionally abused: Not on file     Physically abused: Not on file     Forced sexual activity: Not on file   Other Topics Concern    Not on file   Social History Narrative    Not on file       [place] of systems   General/Constitutional:  Recent weight loss: Yes. She lost 27lbs of weight in the last 1week due to diuresis  Appetite changes: Normal.  Fever:No  Chills:No  HENT: Negative. Eyes: Negative. Upper respiratory tract: No nasal stuffiness with no post nasal drip. Lower respiratory tract/ lungs: See HPI for details. No hemoptysis. Cardiovascular: No palpitations or chest pain. Gastrointestinal: No nausea or vomiting. Neurological: No focal neurologiacal weakness. Extremities: Bilateral leg edema. Musculoskeletal: No complaints. Genitourinary: No complaints. Hematological: Negative. Psychiatric/Behavioral: Negative. Skin: No itching. Vitals     height is 5' 5\" (1.651 m) and weight is 165 lb 8 oz (75.1 kg). Her oral temperature is 97.9 °F (36.6 °C). Her blood pressure is 128/76 and her pulse is 105. Her respiration is 20 and oxygen saturation is 96%. Body mass index is 27.54 kg/m². SUPPLEMENTAL O2: O2 Flow Rate (L/min): 45 L/min       I/O    No intake or output data in the 24 hours ending 06/19/20 1846  No intake/output data recorded. Patient Vitals for the past 96 hrs (Last 3 readings):   Weight   06/19/20 1600 165 lb 8 oz (75.1 kg)       Exam   General Appearance: moderately built, moderately nourished in no acute distress on Hi flow. HEENT: Normal, Head is normocephalic, atraumatic. Oropharynx is clear and moist.  No oral thrush. PERRL  Neck - Supple, No JVD present. No tracheal deviation. Lungs - Bilateral air entry present. Decreased breath sounds on both sides of chest. Bilateral expiratory wheezes. Bilateral basal rales. Cardiovascular - Heart sounds are normal.  Regular rhythm normal rate without murmur, gallop or rub. Abdomen - Soft, nontender, nondistended, no masses or organomegaly  Neurologic - Awake, alert, oriented. There are no focal motor or sensory deficits. Extremities - No cyanosis, clubbing. Bilateral leg edema 2+. Musculoskeletal: Normal range of motion. Patient exhibits no tenderness. Lymphadenopathy:  No cervical adenopathy. Psychiatric: Patient  has a normal mood and affect. Skin - No bruising or bleeding. Labs  - Old records and notes have been reviewed in [facility]   ABG  No results found for: PH, PO2, PCO2, HCO3, O2SAT  Lab Results   Component Value Date    MODE NOT REPORTED 06/18/2020     CBC  No results for input(s): WBC, RBC, HGB, HCT, MCV, MCH, MCHC, RDW, PLT, MPV in the last 72 hours. BMP  Recent Labs     06/17/20  0633 06/18/20  0550 06/19/20  0728   * 147* 148*   K 3.5* 3.9 3.3*    98 96*   CO2 32* 39* 39*   BUN 7* 11 14   CREATININE 0.51 0.52 0.40*   GLUCOSE 86 83 107*   MG 1.8 1.7 1.6   CALCIUM 7.2* 7.1* 7.1*     LFT  No results for input(s): AST, ALT, ALB, BILITOT, ALKPHOS, LIPASE in the last 72 hours. Invalid input(s): AMYLASE  TROP  Lab Results   Component Value Date    TROPONINT NOT REPORTED 06/16/2020    TROPONINT NOT REPORTED 06/16/2020     BNP  No results for input(s): BNP in the last 72 hours. Lactic Acid  No results for input(s): LACTA in the last 72 hours. INR  No results for input(s): INR, PROTIME in the last 72 hours. PTT  No results for input(s): APTT in the last 72 hours. Glucose  No results for input(s): POCGLU in the last 72 hours. UA No results for input(s): SPECGRAV, PHUR, COLORU, CLARITYU, MUCUS, PROTEINU, BLOODU, RBCUA, WBCUA, BACTERIA, NITRU, GLUCOSEU, BILIRUBINUR, UROBILINOGEN, KETUA, LABCAST, LABCASTTY, AMORPHOS in the last 72 hours. Invalid input(s): CRYSTALS. PFTs   None in [facility]    Sleep studies   None in Epic    Cultures    None. EKG     Echocardiogram     [phone] 6/17/2020   [facility]     Transthoracic Echocardiography Report (TTE)  Moderate pulmonary hypertension with an estimated right ventricular systolic  pressure of 48 mmHg.   Mild to moderate tricuspid

## 2020-06-19 NOTE — PROGRESS NOTES
[employer] for update of ETA of transport. Spoke to Ede, she is unsure of a time, writer to call [facility]. Called [employer] and they stated that they are waiting for paperwork. Informed them that paperwork was faxed around 1000. They state that they do not have the paperwork. Writer will update primary RN and fax paperwork.

## 2020-06-20 ENCOUNTER — APPOINTMENT (OUTPATIENT)
Dept: GENERAL RADIOLOGY | Age: 65
DRG: 286 | End: 2020-06-20
Attending: INTERNAL MEDICINE
Payer: MEDICARE

## 2020-06-20 LAB
ALBUMIN SERPL-MCNC: 3.5 G/DL (ref 3.5–5.1)
ALP BLD-CCNC: 79 U/L (ref 38–126)
ALT SERPL-CCNC: 11 U/L (ref 11–66)
ANION GAP SERPL CALCULATED.3IONS-SCNC: 11 MEQ/L (ref 8–16)
AST SERPL-CCNC: 20 U/L (ref 5–40)
BILIRUB SERPL-MCNC: 0.6 MG/DL (ref 0.3–1.2)
BUN BLDV-MCNC: 12 MG/DL (ref 7–22)
CALCIUM SERPL-MCNC: 6.9 MG/DL (ref 8.5–10.5)
CHLORIDE BLD-SCNC: 89 MEQ/L (ref 98–111)
CO2: 44 MEQ/L (ref 23–33)
CREAT SERPL-MCNC: 0.4 MG/DL (ref 0.4–1.2)
ERYTHROCYTE [DISTWIDTH] IN BLOOD BY AUTOMATED COUNT: 16.8 % (ref 11.5–14.5)
ERYTHROCYTE [DISTWIDTH] IN BLOOD BY AUTOMATED COUNT: 55.6 FL (ref 35–45)
GFR SERPL CREATININE-BSD FRML MDRD: > 90 ML/MIN/1.73M2
GLUCOSE BLD-MCNC: 135 MG/DL (ref 70–108)
HCT VFR BLD CALC: 44.1 % (ref 37–47)
HEMOGLOBIN: 12.8 GM/DL (ref 12–16)
LACTIC ACID: 0.7 MMOL/L (ref 0.5–2.2)
LD: 212 U/L (ref 100–190)
MAGNESIUM: 1.6 MG/DL (ref 1.6–2.4)
MAGNESIUM: 1.6 MG/DL (ref 1.6–2.4)
MCH RBC QN AUTO: 26.4 PG (ref 26–33)
MCHC RBC AUTO-ENTMCNC: 29 GM/DL (ref 32.2–35.5)
MCV RBC AUTO: 91.1 FL (ref 81–99)
PLATELET # BLD: 163 THOU/MM3 (ref 130–400)
PMV BLD AUTO: 12 FL (ref 9.4–12.4)
POTASSIUM SERPL-SCNC: 3.4 MEQ/L (ref 3.5–5.2)
POTASSIUM SERPL-SCNC: 3.7 MEQ/L (ref 3.5–5.2)
RBC # BLD: 4.84 MILL/MM3 (ref 4.2–5.4)
REASON FOR REJECTION: NORMAL
REJECTED TEST: NORMAL
SODIUM BLD-SCNC: 144 MEQ/L (ref 135–145)
TOTAL PROTEIN: 5.5 G/DL (ref 6.1–8)
WBC # BLD: 5.3 THOU/MM3 (ref 4.8–10.8)

## 2020-06-20 PROCEDURE — 71045 X-RAY EXAM CHEST 1 VIEW: CPT

## 2020-06-20 PROCEDURE — 6360000002 HC RX W HCPCS: Performed by: INTERNAL MEDICINE

## 2020-06-20 PROCEDURE — 83735 ASSAY OF MAGNESIUM: CPT

## 2020-06-20 PROCEDURE — 94640 AIRWAY INHALATION TREATMENT: CPT

## 2020-06-20 PROCEDURE — 2140000000 HC CCU INTERMEDIATE R&B

## 2020-06-20 PROCEDURE — 84132 ASSAY OF SERUM POTASSIUM: CPT

## 2020-06-20 PROCEDURE — 99291 CRITICAL CARE FIRST HOUR: CPT | Performed by: INTERNAL MEDICINE

## 2020-06-20 PROCEDURE — 36415 COLL VENOUS BLD VENIPUNCTURE: CPT

## 2020-06-20 PROCEDURE — 6360000002 HC RX W HCPCS: Performed by: OPHTHALMOLOGY

## 2020-06-20 PROCEDURE — 6370000000 HC RX 637 (ALT 250 FOR IP): Performed by: OPHTHALMOLOGY

## 2020-06-20 PROCEDURE — 6370000000 HC RX 637 (ALT 250 FOR IP): Performed by: PHYSICIAN ASSISTANT

## 2020-06-20 PROCEDURE — 2580000003 HC RX 258: Performed by: OPHTHALMOLOGY

## 2020-06-20 PROCEDURE — 2700000000 HC OXYGEN THERAPY PER DAY

## 2020-06-20 PROCEDURE — 99233 SBSQ HOSP IP/OBS HIGH 50: CPT | Performed by: INTERNAL MEDICINE

## 2020-06-20 PROCEDURE — 2580000003 HC RX 258: Performed by: INTERNAL MEDICINE

## 2020-06-20 PROCEDURE — 85027 COMPLETE CBC AUTOMATED: CPT

## 2020-06-20 PROCEDURE — 94761 N-INVAS EAR/PLS OXIMETRY MLT: CPT

## 2020-06-20 PROCEDURE — 83615 LACTATE (LD) (LDH) ENZYME: CPT

## 2020-06-20 PROCEDURE — 94669 MECHANICAL CHEST WALL OSCILL: CPT

## 2020-06-20 PROCEDURE — 80053 COMPREHEN METABOLIC PANEL: CPT

## 2020-06-20 PROCEDURE — 83605 ASSAY OF LACTIC ACID: CPT

## 2020-06-20 RX ORDER — SPIRONOLACTONE 25 MG/1
25 TABLET ORAL 2 TIMES DAILY
Status: DISCONTINUED | OUTPATIENT
Start: 2020-06-20 | End: 2020-06-23

## 2020-06-20 RX ORDER — MAGNESIUM SULFATE IN WATER 40 MG/ML
2 INJECTION, SOLUTION INTRAVENOUS ONCE
Status: COMPLETED | OUTPATIENT
Start: 2020-06-21 | End: 2020-06-21

## 2020-06-20 RX ADMIN — IPRATROPIUM BROMIDE AND ALBUTEROL SULFATE 1 AMPULE: .5; 3 SOLUTION RESPIRATORY (INHALATION) at 19:38

## 2020-06-20 RX ADMIN — METHYLPREDNISOLONE SODIUM SUCCINATE 40 MG: 40 INJECTION, POWDER, FOR SOLUTION INTRAMUSCULAR; INTRAVENOUS at 21:37

## 2020-06-20 RX ADMIN — ENOXAPARIN SODIUM 80 MG: 80 INJECTION SUBCUTANEOUS at 15:19

## 2020-06-20 RX ADMIN — Medication 10 ML: at 08:23

## 2020-06-20 RX ADMIN — IPRATROPIUM BROMIDE AND ALBUTEROL SULFATE 1 AMPULE: .5; 3 SOLUTION RESPIRATORY (INHALATION) at 12:15

## 2020-06-20 RX ADMIN — POTASSIUM CHLORIDE 40 MEQ: 1500 TABLET, EXTENDED RELEASE ORAL at 07:17

## 2020-06-20 RX ADMIN — IPRATROPIUM BROMIDE AND ALBUTEROL SULFATE 1 AMPULE: .5; 3 SOLUTION RESPIRATORY (INHALATION) at 15:30

## 2020-06-20 RX ADMIN — FUROSEMIDE 3 MG/HR: 10 INJECTION, SOLUTION INTRAMUSCULAR; INTRAVENOUS at 13:48

## 2020-06-20 RX ADMIN — METHYLPREDNISOLONE SODIUM SUCCINATE 40 MG: 40 INJECTION, POWDER, FOR SOLUTION INTRAMUSCULAR; INTRAVENOUS at 08:23

## 2020-06-20 RX ADMIN — PANTOPRAZOLE SODIUM 40 MG: 40 TABLET, DELAYED RELEASE ORAL at 05:21

## 2020-06-20 RX ADMIN — IPRATROPIUM BROMIDE AND ALBUTEROL SULFATE 1 AMPULE: .5; 3 SOLUTION RESPIRATORY (INHALATION) at 08:30

## 2020-06-20 NOTE — PROGRESS NOTES
breath sounds on both sides of chest. Bilateral expiratory wheezes. Bilateral basal rales. Cardiovascular: Regular rate and rhythm with normal S1/S2 without murmurs, rubs or gallops. Abdomen: Soft, non-tender, non-distended with normal bowel sounds. Musculoskeletal: +2 bilateral pitting edema   Skin: Skin color, texture, turgor normal.  No rashes or lesions. Neurologic:  Neurovascularly intact without any focal sensory/motor deficits. Cranial nerves: II-XII intact, grossly non-focal.  Psychiatric: Alert and oriented, thought content appropriate, normal insight  Capillary Refill: Brisk,< 3 seconds   Peripheral Pulses: +2 palpable, equal bilaterally       Labs:   Recent Labs     06/20/20  0349   WBC 5.3   HGB 12.8   HCT 44.1        Recent Labs     06/20/20  0349      K 3.4*   CL 89*   CO2 44*   BUN 12   CREATININE 0.4   CALCIUM 6.9*     Recent Labs     06/20/20  0349   AST 20   ALT 11   BILITOT 0.6   ALKPHOS 79     No results for input(s): INR in the last 72 hours. No results for input(s): Ama Mary in the last 72 hours. Urinalysis:      Lab Results   Component Value Date    NITRU NEGATIVE 06/19/2020    WBCUA 10-15 06/19/2020    BACTERIA NONE SEEN 06/19/2020    RBCUA 15-25 06/19/2020    BLOODU SMALL 06/19/2020    SPECGRAV 1.015 06/16/2020    Lexii [place] 994 NEGATIVE 06/19/2020       Radiology:   All imaging reviewed     Diet: DIET CARDIAC;      Code Status: Full Code            Electronically signed by Shashi Borges MD on 6/20/2020 at 10:30 AM

## 2020-06-20 NOTE — CONSULTS
tenderness. Bilateral pedal pretibial edema +2 still. SKIN:  No rashes, ulcers or nodules. CNS:  Alert, awake, and oriented to time, person and place. Cranial  nerves are intact. Sensation is intact to light touch and pain. Motor:  Normal muscle strength and tone. Appropriate mood and affect. WORKUP:  EKG, sinus rhythm with old anterior infarction, otherwise no  acute EKG abnormality. Nonspecific ST-T segment abnormality noted. Chest x-ray was done showed evidence of bronchovascular marking,  interstitial edema, suggestive for congestion. CAT scan on admission  showed suggestive for congestive heart failure. Venous Doppler of the  lower extremity, no evidence of deep vein thrombosis. Echocardiogram  done on 06/17/2020; ejection fraction 20% to 25%, right ventricular  systolic pressure of 48 mmHg, mild tricuspid regurgitation, severe  bilateral enlargement, and mild-to-moderate mitral regurgitation has  been noted as well, seems to be related to cardiomyopathy. LABORATORY DATA:  Blood chemistry; sodium 144, potassium 3.4, BUN 12,  creatinine 0.4, and magnesium 1.6. White blood cells 5.3, hemoglobin  12.3, and platelets 914. INR is 1.2. ASSESSMENT:  She is now on high-flow oxygen and saturating well. 1.  Hypoxic respiratory failure secondary to acute pulmonary edema. 2.  Acute pulmonary edema. 3.  Acute combined systolic and diastolic congestive heart failure with  exacerbation. 4.  Severe cardiomyopathy with ejection fraction of 25%, newly  diagnosed. 5.  Mildly elevated pulmonary right ventricular systolic pressure of 48  mmHg. Echo was done at that time when the patient was in severe  congestive heart failure and respiratory failure. I believe that it is  CHF related, will probably come down if to have any repeat  echocardiogram done.   6.  Questionable history of atrial fibrillation, need to be clarified  further because it is documented in the chart, and the EKG does not show  any evidence of atrial fibrillation, something we need to chat with the  patient, if she is known to have any AFib. 7.  History of smoking. 8.  Possible history of COPD. PLAN AND RECOMMENDATION:  1. We will continue with gentle diuresis and appropriate treatment for  cardiomyopathy. She has a low normal blood pressure. Continue the  low-dose lisinopril and Coreg. Not a good candidate for Entresto in  view of very low normal blood pressure. Her blood pressure is 97W  systolic. 2.  With regards to cardiomyopathy, we will continue with lisinopril and  Coreg, and the patient needs LifeVest on discharge, and in view of the  newly diagnosed cardiomyopathy, needs left heart catheterization to rule  out underlying coronary artery disease. I do not believe there is any  need for right heart catheterization at this level. 3.  We will continue with gentle IV diuresis with diuretic drip. We  will decrease the dose to maybe 3 mg per hour and start on Aldactone 25  twice a day. Replace the magnesium to keep more than 2. Keep potassium  more than 4.  4.  I discussed with the patient the plan of care, and the patient is  being weaned off progressively from the high-flow oxygen. 5.  We will do chest x-ray today as the patient has some small bilateral  pleural effusion on the chest x-ray and I will see if there is any  significant improvement on that chest x-ray that was done four days ago. Based on that, we will gauge further care. Thank you for allowing me to participate in the care of this patient. We will follow up with you. Avila Bardales.  Joseph Vaz M.D.    D: 06/20/2020 10:24:56       T: 06/20/2020 12:14:23     [email or web address]  Job#: [id number]     Doc#: [id number]    CC:

## 2020-06-20 NOTE — PROGRESS NOTES
Lovenox. She has been on high flow O2. She is having cough: No    Subjective/Events Past 24 hours/ROS   Still on HI flow   She is on Hi Flow at 40LPM along with 66%. On Lasix 3mg/hr. No chest pain. Improving shortness of breath. Rest of the body systems were reviewed.      PMHx   Past Medical History      Diagnosis Date    A-fib [facility])     Acute respiratory failure with hypoxia ([address] from CHF 6/18/2020    CHF (congestive heart failure) (HCC)     COPD (chronic obstructive pulmonary disease) (HCC)       Past Surgical History        Procedure Laterality Date    CHOLECYSTECTOMY      HERNIA REPAIR      x2    TONSILLECTOMY       Meds    Current Medications    spironolactone  25 mg Oral BID    ipratropium-albuterol  1 ampule Inhalation Q4H WA    carvedilol  3.125 mg Oral BID WC    enoxaparin  1 mg/kg Subcutaneous Q12H    lisinopril  2.5 mg Oral Daily    sodium chloride flush  10 mL Intravenous 2 times per day    pantoprazole  40 mg Oral QAM AC    methylPREDNISolone  40 mg Intravenous Q12H     ondansetron, acetaminophen, potassium chloride **OR** potassium alternative oral replacement **OR** potassium chloride  IV Drips/Infusions   furosemide (LASIX) 1mg/ml infusion 3 mg/hr (06/20/20 1348)     Home Medications  Medications Prior to Admission: lisinopril (PRINIVIL;ZESTRIL) 2.5 MG tablet, Take 1 tablet by mouth daily  carvedilol (COREG) 3.125 MG tablet, Take 1 tablet by mouth 2 times daily (with meals)  furosemide (LASIX) 40 MG tablet, Take 1 tablet by mouth daily  Pediatric Multivitamins-Iron (FLINTSTONES COMPLETE PO), Take 1 tablet by mouth 2 times daily  Vitamins A & D (VITAMIN A & D PO), Take 1 capsule by mouth 3 times daily  calcium citrate-vitamin d (CALCIUM CITRATE + D) 250-200 MG-UNIT TABS, Take by mouth 2 times daily  Multiple Vitamins-Minerals (MULTIVITAMIN ADULT) CHEW, Take by mouth daily  Diet    DIET CARDIAC; Daily Fluid Restriction: 1800 ml  Allergies    Patient has no known allergies. Family History    History reviewed. No pertinent family history. Sleep History    Never diagnosed with sleep apnea in the past    Social History     Social History     Socioeconomic History    Marital status:      Spouse name: Not on file    Number of children: 3    Years of education: Not on file    Highest education level: Not on file   Occupational History    Not on file   Social Needs    Financial resource strain: Not on file    Food insecurity     Worry: Not on file     Inability: Not on file   [employer] needs     Medical: Not on file     Non-medical: Not on file   Tobacco Use    Smoking status: Current Every Day Smoker     Packs/day: 0.25     Years: 20.00     Pack years: 5.00     Types: Cigarettes    Smokeless tobacco: Current User   Substance and Sexual Activity    Alcohol use: Not Currently    Drug use: Never    Sexual activity: Not on file   Lifestyle    Physical activity     Days per week: Not on file     Minutes per session: Not on file    Stress: Not on file   Relationships    Social connections     Talks on phone: Not on file     Gets together: Not on file     Attends Faith service: Not on file     Active member of club or organization: Not on file     Attends meetings of clubs or organizations: Not on file     Relationship status: Not on file    Intimate partner violence     Fear of current or ex partner: Not on file     Emotionally abused: Not on file     Physically abused: Not on file     Forced sexual activity: Not on file   Other Topics Concern    Not on file   Social History Narrative    Not on file       Vitals     height is 5' 5\" (1.651 m) and weight is 165 lb (74.8 kg). Her oral temperature is 97.9 °F (36.6 °C). Her blood pressure is 105/70 and her pulse is 71. Her respiration is 22 and oxygen saturation is 95%. Body mass index is 27.46 kg/m².     SUPPLEMENTAL O2: O2 Flow Rate (L/min): 40 L/min       I/O        Intake/Output Summary (Last 24 Electronically signed by Josephine Nicolas(Sonographer) on 06/17/2020 01:49 PM  ----------------------------------------------------------------------------     ----------------------------------------------------------------------------   Electronically signed by Tej Knutson(Interpreting physician) on   06/17/2020 04:49 PM  ----------------------------------------------------------------------------      Radiology    CXR  Jun 16, 2020   EXAMINATION: ONE XRAY VIEW OF THE CHEST   Findings of CHF/volume overload. CT Scans  (See actual reports for details)  Jun 16, 2020   EXAMINATION: CTA OF THE CHEST 6/16/2020 3:17 pm   Imaging findings of CHF decompensation. Subtle eccentric filling defect in right upper lobe branch pulmonary artery (series 601, image 98) is suspicious of small subsegmental pulmonary embolism. Assessment   -Acute hypoxic respiratory failure due to due to decompensated CHF Vs Bilateral pleural effusions Right > Left Vs small subsegmental pulmonary embolism in the right upper lobe. -Bilateral pleural effusions Right > Left- ? Etiology.  -Acute decompensated chronic systolic CHF. -Pulmonary hypertension noted on Echocardiogram- Most likely due to Left heart disease Vs COPD Vs Pulmonary embolism Vs other etiologies.  -Chronic hx of tobacco smoking for 20years. She quit smoking 1 week back- High suspicion for COPD.  -COPD with exacerbation. Plan   -Continue optimization of CHF. She is currently on Lasix drip at 3mg/hr  -She is waiting for ultrasound guided thoracentesis on Right side of chest with interventional radiology service.  -Send pleural fluid to routine analysis including PH, total protein, LDH,Glucose, cell count, Cytology with cell block, Gram stain and cultures.  Please send simultaneous serum total protein and LDH for comparison.  -Joann Rashid was educated and informed about planned thoracentesis procedure and its associated compliacations including but not limited to

## 2020-06-21 ENCOUNTER — APPOINTMENT (OUTPATIENT)
Dept: GENERAL RADIOLOGY | Age: 65
DRG: 286 | End: 2020-06-21
Attending: INTERNAL MEDICINE
Payer: MEDICARE

## 2020-06-21 LAB
ANION GAP SERPL CALCULATED.3IONS-SCNC: 9 MEQ/L (ref 8–16)
BASOPHILS # BLD: 0.1 %
BASOPHILS ABSOLUTE: 0 THOU/MM3 (ref 0–0.1)
BUN BLDV-MCNC: 15 MG/DL (ref 7–22)
CALCIUM SERPL-MCNC: 7.1 MG/DL (ref 8.5–10.5)
CHLORIDE BLD-SCNC: 90 MEQ/L (ref 98–111)
CO2: 43 MEQ/L (ref 23–33)
CREAT SERPL-MCNC: 0.4 MG/DL (ref 0.4–1.2)
EOSINOPHIL # BLD: 0 %
EOSINOPHILS ABSOLUTE: 0 THOU/MM3 (ref 0–0.4)
ERYTHROCYTE [DISTWIDTH] IN BLOOD BY AUTOMATED COUNT: 16.6 % (ref 11.5–14.5)
ERYTHROCYTE [DISTWIDTH] IN BLOOD BY AUTOMATED COUNT: 54.7 FL (ref 35–45)
GFR SERPL CREATININE-BSD FRML MDRD: > 90 ML/MIN/1.73M2
GLUCOSE BLD-MCNC: 137 MG/DL (ref 70–108)
HCT VFR BLD CALC: 42.5 % (ref 37–47)
HEMOGLOBIN: 12.6 GM/DL (ref 12–16)
IMMATURE GRANS (ABS): 0.02 THOU/MM3 (ref 0–0.07)
IMMATURE GRANULOCYTES: 0.3 %
LYMPHOCYTES # BLD: 7.3 %
LYMPHOCYTES ABSOLUTE: 0.6 THOU/MM3 (ref 1–4.8)
MAGNESIUM: 2.1 MG/DL (ref 1.6–2.4)
MCH RBC QN AUTO: 26.6 PG (ref 26–33)
MCHC RBC AUTO-ENTMCNC: 29.6 GM/DL (ref 32.2–35.5)
MCV RBC AUTO: 89.9 FL (ref 81–99)
MONOCYTES # BLD: 4.2 %
MONOCYTES ABSOLUTE: 0.3 THOU/MM3 (ref 0.4–1.3)
NUCLEATED RED BLOOD CELLS: 0 /100 WBC
ORGANISM: ABNORMAL
PLATELET # BLD: 173 THOU/MM3 (ref 130–400)
PMV BLD AUTO: 12.5 FL (ref 9.4–12.4)
POTASSIUM SERPL-SCNC: 4 MEQ/L (ref 3.5–5.2)
PRO-BNP: 1499 PG/ML (ref 0–900)
RBC # BLD: 4.73 MILL/MM3 (ref 4.2–5.4)
SEG NEUTROPHILS: 88.1 %
SEGMENTED NEUTROPHILS ABSOLUTE COUNT: 6.8 THOU/MM3 (ref 1.8–7.7)
SODIUM BLD-SCNC: 142 MEQ/L (ref 135–145)
URINE CULTURE REFLEX: ABNORMAL
WBC # BLD: 7.7 THOU/MM3 (ref 4.8–10.8)

## 2020-06-21 PROCEDURE — 94761 N-INVAS EAR/PLS OXIMETRY MLT: CPT

## 2020-06-21 PROCEDURE — 2700000000 HC OXYGEN THERAPY PER DAY

## 2020-06-21 PROCEDURE — 85025 COMPLETE CBC W/AUTO DIFF WBC: CPT

## 2020-06-21 PROCEDURE — 83735 ASSAY OF MAGNESIUM: CPT

## 2020-06-21 PROCEDURE — 99232 SBSQ HOSP IP/OBS MODERATE 35: CPT | Performed by: INTERNAL MEDICINE

## 2020-06-21 PROCEDURE — 2580000003 HC RX 258: Performed by: OPHTHALMOLOGY

## 2020-06-21 PROCEDURE — 6370000000 HC RX 637 (ALT 250 FOR IP): Performed by: NURSE PRACTITIONER

## 2020-06-21 PROCEDURE — 94669 MECHANICAL CHEST WALL OSCILL: CPT

## 2020-06-21 PROCEDURE — 94640 AIRWAY INHALATION TREATMENT: CPT

## 2020-06-21 PROCEDURE — 6370000000 HC RX 637 (ALT 250 FOR IP): Performed by: OPHTHALMOLOGY

## 2020-06-21 PROCEDURE — 83880 ASSAY OF NATRIURETIC PEPTIDE: CPT

## 2020-06-21 PROCEDURE — 71046 X-RAY EXAM CHEST 2 VIEWS: CPT

## 2020-06-21 PROCEDURE — 6360000002 HC RX W HCPCS: Performed by: NURSE PRACTITIONER

## 2020-06-21 PROCEDURE — 97166 OT EVAL MOD COMPLEX 45 MIN: CPT

## 2020-06-21 PROCEDURE — 2140000000 HC CCU INTERMEDIATE R&B

## 2020-06-21 PROCEDURE — 80048 BASIC METABOLIC PNL TOTAL CA: CPT

## 2020-06-21 PROCEDURE — 99233 SBSQ HOSP IP/OBS HIGH 50: CPT | Performed by: INTERNAL MEDICINE

## 2020-06-21 PROCEDURE — 6360000002 HC RX W HCPCS: Performed by: PHYSICIAN ASSISTANT

## 2020-06-21 PROCEDURE — 97530 THERAPEUTIC ACTIVITIES: CPT

## 2020-06-21 PROCEDURE — 36415 COLL VENOUS BLD VENIPUNCTURE: CPT

## 2020-06-21 PROCEDURE — 99232 SBSQ HOSP IP/OBS MODERATE 35: CPT | Performed by: NURSE PRACTITIONER

## 2020-06-21 PROCEDURE — 6360000002 HC RX W HCPCS: Performed by: OPHTHALMOLOGY

## 2020-06-21 PROCEDURE — 6360000002 HC RX W HCPCS: Performed by: INTERNAL MEDICINE

## 2020-06-21 RX ORDER — FUROSEMIDE 10 MG/ML
40 INJECTION INTRAMUSCULAR; INTRAVENOUS 2 TIMES DAILY
Status: DISCONTINUED | OUTPATIENT
Start: 2020-06-21 | End: 2020-06-23

## 2020-06-21 RX ADMIN — METHYLPREDNISOLONE SODIUM SUCCINATE 40 MG: 40 INJECTION, POWDER, FOR SOLUTION INTRAMUSCULAR; INTRAVENOUS at 20:28

## 2020-06-21 RX ADMIN — PANTOPRAZOLE SODIUM 40 MG: 40 TABLET, DELAYED RELEASE ORAL at 06:21

## 2020-06-21 RX ADMIN — IPRATROPIUM BROMIDE AND ALBUTEROL SULFATE 1 AMPULE: .5; 3 SOLUTION RESPIRATORY (INHALATION) at 20:03

## 2020-06-21 RX ADMIN — MAGNESIUM SULFATE HEPTAHYDRATE 2 G: 40 INJECTION, SOLUTION INTRAVENOUS at 00:11

## 2020-06-21 RX ADMIN — CARVEDILOL 3.12 MG: 3.12 TABLET, FILM COATED ORAL at 18:25

## 2020-06-21 RX ADMIN — Medication 10 ML: at 09:10

## 2020-06-21 RX ADMIN — Medication 10 ML: at 20:28

## 2020-06-21 RX ADMIN — ENOXAPARIN SODIUM 80 MG: 80 INJECTION SUBCUTANEOUS at 18:26

## 2020-06-21 RX ADMIN — ACETAMINOPHEN 650 MG: 325 TABLET ORAL at 09:06

## 2020-06-21 RX ADMIN — IPRATROPIUM BROMIDE AND ALBUTEROL SULFATE 1 AMPULE: .5; 3 SOLUTION RESPIRATORY (INHALATION) at 11:25

## 2020-06-21 RX ADMIN — FUROSEMIDE 40 MG: 10 INJECTION, SOLUTION INTRAMUSCULAR; INTRAVENOUS at 18:26

## 2020-06-21 RX ADMIN — IPRATROPIUM BROMIDE AND ALBUTEROL SULFATE 1 AMPULE: .5; 3 SOLUTION RESPIRATORY (INHALATION) at 15:21

## 2020-06-21 RX ADMIN — METHYLPREDNISOLONE SODIUM SUCCINATE 40 MG: 40 INJECTION, POWDER, FOR SOLUTION INTRAMUSCULAR; INTRAVENOUS at 09:02

## 2020-06-21 RX ADMIN — ENOXAPARIN SODIUM 80 MG: 80 INJECTION SUBCUTANEOUS at 06:21

## 2020-06-21 RX ADMIN — IPRATROPIUM BROMIDE AND ALBUTEROL SULFATE 1 AMPULE: .5; 3 SOLUTION RESPIRATORY (INHALATION) at 07:35

## 2020-06-21 ASSESSMENT — PAIN SCALES - GENERAL
PAINLEVEL_OUTOF10: 3
PAINLEVEL_OUTOF10: 0
PAINLEVEL_OUTOF10: 0

## 2020-06-21 NOTE — PROGRESS NOTES
for input(s): BNP in the last 72 hours. Lactic Acid  Recent Labs     06/20/20  0349   LACTA 0.7     INR  No results for input(s): INR, PROTIME in the last 72 hours. PTT  No results for input(s): APTT in the last 72 hours. Glucose  No results for input(s): POCGLU in the last 72 hours. UA   Recent Labs     06/19/20  1830   PHUR 8.5   COLORU YELLOW   PROTEINU NEGATIVE   BLOODU SMALL*   RBCUA 15-25   WBCUA 10-15   BACTERIA NONE SEEN   NITRU NEGATIVE   GLUCOSEU NEGATIVE   BILIRUBINUR NEGATIVE   UROBILINOGEN 4.0*   KETUA NEGATIVE   . PFTs   None in [facility]    Sleep studies   None in Epic    Cultures    None. EKG     Echocardiogram     [phone] 6/17/2020   [facility]      [facility]     Transthoracic Echocardiography Report (TTE)  Moderate pulmonary hypertension with an estimated right ventricular systolic  pressure of 48 mmHg. Mild to moderate tricuspid regurgitation. Pulmonic Valve  CONCLUSIONS     Summary  Global left ventricular systolic function appears severely reduced with an  estimated ejection fraction of 20-25%. Mildly increased left ventricular wall thickness with a normal left  ventricular cavity size. Severe biatrial enlargement. Right ventricular dilatation. Normal mitral valve structure with mild to moderate mitral regurgitation. Moderate pulmonary hypertension with an estimated right ventricular systolic  pressure of 48 mmHg. Mild to moderate tricuspid regurgitation. Diastology cannot be properly assessed due to the patients rhythm. No previous studies were available for comparison. If the patients reduced ejection fraction is a new finding, consider  additional ischemic cardiac testing if clinically indicated.      Signature  ----------------------------------------------------------------------------   Electronically signed by Ariel NicolasSonographer) on 06/17/2020 01:49 PM  ----------------------------------------------------------------------------

## 2020-06-21 NOTE — PROGRESS NOTES
WNL    COGNITION: WNL    RANGE OF MOTION:  Bilateral Upper Extremity:  WNL    STRENGTH:  Bilateral Upper Extremity:  WNL    ADL:   Lower Extremity Dressing: Moderate Assistance. donning socks. BALANCE:  Sitting Balance:  Stand By Assistance. sitting EOB  Standing Balance: Contact Guard Assistance. BED MOBILITY:  Supine to Sit: Stand By Assistance      TRANSFERS:  Sit to Stand:  Contact Guard Assistance. from eOB  Stand to Sit: [address] into chair    FUNCTIONAL MOBILITY:  Assistive Device: None  Assist Level:  Contact Guard Assistance. Distance: to bedside chair   Hand held assist with no LOB. Pt on high flow O2 at this time with no complaints of incresed SOB          Activity Tolerance:  Patient tolerance of  treatment: fair. Assessment:  Assessment: pt transferred from outside hospital with CHF. pt currently on high flow O2 and did not wear any O2 PTA. pt demo decreased endruance and ndep with ADL atsks. Pt would benefit from skilled OT services to educate on energy conservation tech to use during ADL tasks and to have increased indep wtih ADL task. Performance deficits / Impairments: Decreased strength, Decreased endurance, Decreased ADL status, Decreased safe awareness, Decreased balance  Prognosis: Fair  REQUIRES OT FOLLOW UP: Yes  Decision Making: Medium Complexity    Treatment Initiated: Treatment and education initiated within context of evaluation. Evaluation time included review of current medical information, gathering information related to past medical, social and functional history, completion of standardized testing, formal and informal observation of tasks, assessment of data and development of plan of care and goals. Treatment time included skilled education and facilitation of tasks to increase safety and independence with ADL's for improved functional independence and quality of life.     Discharge Recommendations:  Patient would benefit from continued therapy

## 2020-06-21 NOTE — PROGRESS NOTES
Hospitalist Progress Note    Patient:  Araceli Rondon      Unit/Bed:3B-38/038-A    YOB: 1955    MRN: [id number]       Acct: [de-identified]     PCP: Belia Gunter    Date of Admission: 6/19/2020    Active Hospital Problems    Diagnosis Date Noted    Acute combined systolic and diastolic congestive heart failure, NYHA class 4 ([facility] [I50.41] 06/19/2020    Acute pulmonary embolism ([facility] ? / Normal BLE Venous Ultrasound 2020 / Suspect Radiology Overread [I26.99] 06/17/2020    Anasarca [R60.1] 06/17/2020     Assessment/Plan:     1. Acute Hypoxic Resp. Failure: multifactorial due to CHF, COPD, and PE. On High Flow NC. Transitioned from lasix drip to IV Lasix 40 BID on 6/21. Cont IV Solumedrol and Lovenox 1 mg/kg BID. Wean O2 as tolerated.      2. Acute on Chronic Combined Systolic and Diastolic CHF: significant fluid overload on high flow NC. Responding well to diuresis. 2D Echo EF 20% with moderate pulm HTN. Transferred to [address] [place] [address] on 6/19 for escalation of care for cardiac work-up. Transitioned from lasix drip to IV Lasix 40 BID on 6/21. Plan for [facility] once once off high flow. Life Vest. Cont BB, ACEi, and Aldactone.      3. B/L Pleural Effusions: likely 2/2 to CHF. Repeat CXR showing improvement in effusions. Cont diuresis. 4. Moderate Pulmonary HTN: should improve with diuresis. Cont management.      5. Acute COPD Exacerbation: Solumedrol to 40mg IV bid, duo-nebs q4 while awake. Pulmonary following. Smoking cessation      6. Acute PE? Per [place] notes, \"unlikely, reviewed with Dr. Alicia Goldberg and Radiologist, however was not able to have a clear view on films\". Will continue pt on Lovenox 1 mg/kg BID. Dispo: change from lasix drip to lasix 40 BID due to low BP readings. Cont diuresis. Chief Complaint: sob      Hospital Course: 72 y. o. female admitted to [address] [place] [address] with SOB. She was transferred from [facility].  She was admitted to [place] on 6/17/2020 for CHF exacerbation and

## 2020-06-21 NOTE — PROGRESS NOTES
feeling much better. She is on high-flow oxygen, and  oxygen need is coming down significantly.     The patient stated that she has no known history of coronary artery  disease. No coronary intervention. No history of cardiomyopathy.   No  history of cardiac problem whatsoever.       Subjective (Events in last 24 hours):     Diuresed 4.2L / 24 hours  Still on high ehsan Oxygen     Pt states she feels improved   LE swelling much improved     's  Tele SR no ectopy      Objective:   /65   Pulse 72   Temp 98 °F (36.7 °C) (Oral)   Resp 22   Ht 5' 5\" (1.651 m)   Wt 153 lb 6.4 oz (69.6 kg)   SpO2 93%   BMI 25.53 kg/m²        TELEMETRY: SR    Physical Exam:  General Appearance: alert and oriented to person, place and time, in no acute distress  Cardiovascular: normal rate, regular rhythm, normal S1 and S2, no murmurs, rubs, clicks, or gallops, distal pulses intact,   Pulmonary/Chest: clear to auscultation bilaterally- no wheezes, rales or rhonchi, normal air movement, no respiratory distress  Abdomen: soft, non-tender, non-distended, normal bowel sounds, no masses Extremities: no cyanosis, clubbing --+ 1- 1\2 NP LE edema, pulses present    Skin: warm and dry, no rash or erythema  Head: normocephalic and atraumatic  Musculoskeletal: normal range of motion, no joint swelling, deformity or tenderness  Neurological: alert, oriented, normal speech, no focal findings or movement disorder noted    Medications:    spironolactone  25 mg Oral BID    magnesium replacement protocol   Other RX Placeholder    ipratropium-albuterol  1 ampule Inhalation Q4H WA    carvedilol  3.125 mg Oral BID WC    enoxaparin  1 mg/kg Subcutaneous Q12H    lisinopril  2.5 mg Oral Daily    sodium chloride flush  10 mL Intravenous 2 times per day    pantoprazole  40 mg Oral QAM AC    methylPREDNISolone  40 mg Intravenous Q12H      furosemide (LASIX) 1mg/ml infusion 3 mg/hr (06/20/20 1348)     ondansetron, 4 mg, Q6H PRN  acetaminophen, 650 mg, Q4H PRN  potassium chloride, 40 mEq, PRN    Or  potassium alternative oral replacement, 40 mEq, PRN    Or  potassium chloride, 10 mEq, PRN        Diagnostics:  EK2020 10:54:55 [facility] ROUTINE RETRIEVAL  ** Poor data quality, interpretation may be adversely affected  Sinus tachycardia with Premature atrial complexes with Aberrant conduction  Nonspecific T wave abnormality  Abnormal ECG  When compared with ECG of 2020 13:00,  Premature ventricular complexes are no longer Present  Nonspecific T wave abnormality now evident in Inferior leads  Nonspecific T wave abnormality, worse in Lateral leads  Confirmed by Cy Bernabe MD, Pancho Burton ([id number]) on 2020 6:07:14 PM    Echo:    Electronically signed by Tej Knutson(Interpreting physician) on   2020 04:49 PM  ----------------------------------------------------------------------------  FINDINGS  Left Atrium  The left atrium is severely dilated (>40) with a left atrial volume index of  41 ml/m2. Left Ventricle  Global left ventricular systolic function appears severely reduced with an  estimated ejection fraction of 20-25%. Mildly increased left ventricular wall thickness with a normal left  ventricular cavity size. Right Atrium  The right atrium appears severely dilated. Right Ventricle  Right ventricular dilatation. Mitral Valve  Normal mitral valve structure with mild to moderate mitral regurgitation. Aortic Valve  Normal aortic valve structure and function without stenosis or  regurgitation. Tricuspid Valve  Moderate pulmonary hypertension with an estimated right ventricular systolic  pressure of 48 mmHg. Mild to moderate tricuspid regurgitation. Pulmonic Valve  The pulmonic valve is normal in structure. Pericardial Effusion  No significant pericardial effusion is seen.     Miscellaneous  Diastology cannot be properly assessed due to the patients rhythm. Normal aortic root dimension.   IVC

## 2020-06-22 LAB
ALBUMIN (CALCULATED): 3.4 G/DL (ref 3.2–5.2)
ALBUMIN PERCENT: 66 % (ref 45–65)
ALPHA 1 PERCENT: 3 % (ref 3–6)
ALPHA 2 PERCENT: 12 % (ref 6–13)
ALPHA-1-GLOBULIN: 0.1 G/DL (ref 0.1–0.4)
ALPHA-2-GLOBULIN: 0.6 G/DL (ref 0.5–0.9)
ANION GAP SERPL CALCULATED.3IONS-SCNC: 9 MEQ/L (ref 8–16)
BASOPHILS # BLD: 0.3 %
BASOPHILS ABSOLUTE: 0 THOU/MM3 (ref 0–0.1)
BETA GLOBULIN: 0.6 G/DL (ref 0.5–1.1)
BETA PERCENT: 12 % (ref 11–19)
BUN BLDV-MCNC: 19 MG/DL (ref 7–22)
CALCIUM SERPL-MCNC: 7.4 MG/DL (ref 8.5–10.5)
CHLORIDE BLD-SCNC: 91 MEQ/L (ref 98–111)
CO2: 39 MEQ/L (ref 23–33)
CREAT SERPL-MCNC: 0.5 MG/DL (ref 0.4–1.2)
EOSINOPHIL # BLD: 0 %
EOSINOPHILS ABSOLUTE: 0 THOU/MM3 (ref 0–0.4)
ERYTHROCYTE [DISTWIDTH] IN BLOOD BY AUTOMATED COUNT: 16.7 % (ref 11.5–14.5)
ERYTHROCYTE [DISTWIDTH] IN BLOOD BY AUTOMATED COUNT: 54.3 FL (ref 35–45)
GAMMA GLOBULIN %: 9 % (ref 9–20)
GAMMA GLOBULIN: 0.4 G/DL (ref 0.5–1.5)
GFR SERPL CREATININE-BSD FRML MDRD: > 90 ML/MIN/1.73M2
GLUCOSE BLD-MCNC: 125 MG/DL (ref 70–108)
HCT VFR BLD CALC: 42 % (ref 37–47)
HEMOGLOBIN: 12.5 GM/DL (ref 12–16)
IMMATURE GRANS (ABS): 0.02 THOU/MM3 (ref 0–0.07)
IMMATURE GRANULOCYTES: 0.3 %
LYMPHOCYTES # BLD: 8.7 %
LYMPHOCYTES ABSOLUTE: 0.7 THOU/MM3 (ref 1–4.8)
MAGNESIUM: 2.2 MG/DL (ref 1.6–2.4)
MCH RBC QN AUTO: 26.7 PG (ref 26–33)
MCHC RBC AUTO-ENTMCNC: 29.8 GM/DL (ref 32.2–35.5)
MCV RBC AUTO: 89.6 FL (ref 81–99)
MONOCYTES # BLD: 4.2 %
MONOCYTES ABSOLUTE: 0.3 THOU/MM3 (ref 0.4–1.3)
NUCLEATED RED BLOOD CELLS: 0 /100 WBC
PATHOLOGIST: ABNORMAL
PATHOLOGIST: NORMAL
PLATELET # BLD: 167 THOU/MM3 (ref 130–400)
PMV BLD AUTO: 12.6 FL (ref 9.4–12.4)
POTASSIUM SERPL-SCNC: 4.6 MEQ/L (ref 3.5–5.2)
PROTEIN ELECTROPHORESIS, SERUM: ABNORMAL
RBC # BLD: 4.69 MILL/MM3 (ref 4.2–5.4)
SEG NEUTROPHILS: 86.5 %
SEGMENTED NEUTROPHILS ABSOLUTE COUNT: 6.6 THOU/MM3 (ref 1.8–7.7)
SERUM IFX INTERP: NORMAL
SODIUM BLD-SCNC: 139 MEQ/L (ref 135–145)
TOTAL PROT. SUM,%: 102 % (ref 98–102)
TOTAL PROT. SUM: 5.1 G/DL (ref 6.3–8.2)
TOTAL PROTEIN: 5.1 G/DL (ref 6.4–8.3)
WBC # BLD: 7.6 THOU/MM3 (ref 4.8–10.8)

## 2020-06-22 PROCEDURE — 94010 BREATHING CAPACITY TEST: CPT

## 2020-06-22 PROCEDURE — 97110 THERAPEUTIC EXERCISES: CPT

## 2020-06-22 PROCEDURE — 6360000002 HC RX W HCPCS: Performed by: NURSE PRACTITIONER

## 2020-06-22 PROCEDURE — 83735 ASSAY OF MAGNESIUM: CPT

## 2020-06-22 PROCEDURE — 6370000000 HC RX 637 (ALT 250 FOR IP): Performed by: NURSE PRACTITIONER

## 2020-06-22 PROCEDURE — 2580000003 HC RX 258: Performed by: OPHTHALMOLOGY

## 2020-06-22 PROCEDURE — 6370000000 HC RX 637 (ALT 250 FOR IP): Performed by: OPHTHALMOLOGY

## 2020-06-22 PROCEDURE — 94760 N-INVAS EAR/PLS OXIMETRY 1: CPT

## 2020-06-22 PROCEDURE — 99233 SBSQ HOSP IP/OBS HIGH 50: CPT | Performed by: FAMILY MEDICINE

## 2020-06-22 PROCEDURE — 94640 AIRWAY INHALATION TREATMENT: CPT

## 2020-06-22 PROCEDURE — 97162 PT EVAL MOD COMPLEX 30 MIN: CPT

## 2020-06-22 PROCEDURE — 36415 COLL VENOUS BLD VENIPUNCTURE: CPT

## 2020-06-22 PROCEDURE — 6360000002 HC RX W HCPCS: Performed by: OPHTHALMOLOGY

## 2020-06-22 PROCEDURE — 80048 BASIC METABOLIC PNL TOTAL CA: CPT

## 2020-06-22 PROCEDURE — 2140000000 HC CCU INTERMEDIATE R&B

## 2020-06-22 PROCEDURE — 6360000002 HC RX W HCPCS: Performed by: INTERNAL MEDICINE

## 2020-06-22 PROCEDURE — 2700000000 HC OXYGEN THERAPY PER DAY

## 2020-06-22 PROCEDURE — 99232 SBSQ HOSP IP/OBS MODERATE 35: CPT | Performed by: NURSE PRACTITIONER

## 2020-06-22 PROCEDURE — 99232 SBSQ HOSP IP/OBS MODERATE 35: CPT | Performed by: INTERNAL MEDICINE

## 2020-06-22 PROCEDURE — 85025 COMPLETE CBC W/AUTO DIFF WBC: CPT

## 2020-06-22 RX ADMIN — IPRATROPIUM BROMIDE AND ALBUTEROL SULFATE 1 AMPULE: .5; 3 SOLUTION RESPIRATORY (INHALATION) at 11:08

## 2020-06-22 RX ADMIN — IPRATROPIUM BROMIDE AND ALBUTEROL SULFATE 1 AMPULE: .5; 3 SOLUTION RESPIRATORY (INHALATION) at 19:59

## 2020-06-22 RX ADMIN — METHYLPREDNISOLONE SODIUM SUCCINATE 40 MG: 40 INJECTION, POWDER, FOR SOLUTION INTRAMUSCULAR; INTRAVENOUS at 09:49

## 2020-06-22 RX ADMIN — Medication 10 ML: at 21:24

## 2020-06-22 RX ADMIN — IPRATROPIUM BROMIDE AND ALBUTEROL SULFATE 1 AMPULE: .5; 3 SOLUTION RESPIRATORY (INHALATION) at 05:59

## 2020-06-22 RX ADMIN — CARVEDILOL 3.12 MG: 3.12 TABLET, FILM COATED ORAL at 09:49

## 2020-06-22 RX ADMIN — FUROSEMIDE 40 MG: 10 INJECTION, SOLUTION INTRAMUSCULAR; INTRAVENOUS at 09:49

## 2020-06-22 RX ADMIN — IPRATROPIUM BROMIDE AND ALBUTEROL SULFATE 1 AMPULE: .5; 3 SOLUTION RESPIRATORY (INHALATION) at 15:37

## 2020-06-22 RX ADMIN — Medication 10 ML: at 09:49

## 2020-06-22 RX ADMIN — PANTOPRAZOLE SODIUM 40 MG: 40 TABLET, DELAYED RELEASE ORAL at 05:27

## 2020-06-22 RX ADMIN — ENOXAPARIN SODIUM 80 MG: 80 INJECTION SUBCUTANEOUS at 05:27

## 2020-06-22 RX ADMIN — METHYLPREDNISOLONE SODIUM SUCCINATE 40 MG: 40 INJECTION, POWDER, FOR SOLUTION INTRAMUSCULAR; INTRAVENOUS at 21:24

## 2020-06-22 RX ADMIN — CARVEDILOL 3.12 MG: 3.12 TABLET, FILM COATED ORAL at 18:18

## 2020-06-22 RX ADMIN — ENOXAPARIN SODIUM 80 MG: 80 INJECTION SUBCUTANEOUS at 18:14

## 2020-06-22 RX ADMIN — FUROSEMIDE 40 MG: 10 INJECTION, SOLUTION INTRAMUSCULAR; INTRAVENOUS at 18:14

## 2020-06-22 ASSESSMENT — PAIN SCALES - GENERAL: PAINLEVEL_OUTOF10: 0

## 2020-06-22 NOTE — PROGRESS NOTES
Assistance: Independent    Active : Yes     Additional Comments: Pt stating she was very indep at home with all ADLs and completing simple homemamking tasks. Pt stating her spouse or son is home all the time. OBJECTIVE:  Range of Motion:  Right Lower Extremity: WFL  Left Lower Extremity: [facility]    Strength:  Right Lower Extremity: Impaired - hip flexion 4/5, knee 4-/5, ankle 4/5  Left Lower Extremity: Impaired - hip flexion 4/5, knee 4-/5, ankle 4/5    Balance:  Static Sitting Balance:  Independent  Static Standing Balance: Stand By Assistance  Dynamic Standing Balance: Contact Guard Assistance    Bed Mobility:  Supine to Sit: Modified Independent, with head of bed raised    Transfers:  Sit to Stand: Stand By Assistance  Stand to Sit:Stand By Assistance    Ambulation:  Contact Guard Assistance  Distance: 3'  Surface: Level Tile  Device:No Device  Gait Deviations: Forward Flexed Posture, Slow Kelly, Decreased Step Length Bilaterally and Decreased Heel Strike Bilaterally    Exercise:  Patient was guided in 1 set(s) 10-20 reps of exercise to both lower extremities. Seated: long arc quad x 12, ankle pumps x 20, hip abduction x 12, march x 12. Supine: quad sets 5\" x 10, glut sets 5\" x 10, ankle pumps x 20, hip abduction x10, heel slides x 10, ankle pumps x 20. Exercises were completed for increased independence with functional mobility. Functional Outcome Measures: Completed  -PAC Inpatient Mobility without Stair Climbing Raw Score : 17  AM-PAC Inpatient without Stair Climbing T-Scale Score : 48.47    ASSESSMENT:  Activity Tolerance:  Patient tolerance of  treatment: good. Limited by high flow O2. O2 94% on high flow O2 with activity/rest.      Treatment Initiated: Treatment and education initiated within context of evaluation.   Evaluation time included review of current medical information, gathering information related to past medical, social and functional history, completion of standardized

## 2020-06-22 NOTE — PROGRESS NOTES
Pharmacy Medication History Note      List of current medications patient is taking is complete. Source of information: epic fill history, patient    Patient reports medication list is accurate and up  to date  Denies use of other OTC or herbal medications.       Allergies reviewed      Electronically signed by Jaylen Martins on 6/22/2020 at 10:31 AM

## 2020-06-22 NOTE — PROGRESS NOTES
Hospitalist Progress Note      Patient:  Gloria Colon      Unit/Bed:3B-38/038-A    YOB: 1955    MRN: [id number]       Acct: [de-identified]     PCP: Monte Siemens Rine    Date of Admission: 6/19/2020    Assessment/Plan:    1. Acute hypoxic respiratory failure -- POA -- multifactorial with CHF, COPD, ? ischemic heart disease, ? PE -- apprec pulm and cardio assist --> on high flow O2 and weaned to 6L O2 as of 6/22 -- last CXR 6/21/2020 = CM with pulm vasc congestion slightly improved from prior and small bilateral effusions  -- per cardio IV diuresis with for new acute systolic CHF -> s/p lasix gtt and changed to lasix 40 mg IV bid 6/21 -- cont monitor I/O;   -- Needs ischemic w/u when pulm status improves with abn stress at [address] 6/17/2020  -- per pulm treating as COPD exac also with steroids, duonebs  -- treating PE per CT chest at [address] 6/16/2020 with therapeutic lovenox 1 mg/kg bid --> change to oral [address] when ok with cardio (no procedure needed) and ??if need to repeat CTA chest to r/o PE prior to committing pt to anticoagulation  -- afeb, WBC WNL thus unlikely infectious etiology  2. Acute systolic and diastolic CHF/cardiomyopathy -- new -- Echo at [address] 6/17/2020 = EF 20-25%, severe biatrial enlargement, RV dilation, mod pulm HTN RVSP 48 mmHg, mild/mod MR, mild/mod TR  -- apprec cardiology --> needs LHC and RHC (stress at [address] abnd)  -- s/p lasix gtt on admission and changed to lasix 40 mg IV bid 6/21 --> aldactone 25 mg bid started 6/20 but NOT given due to low BP and excellent diuresis with IV lasix  -- coreg 3.125 mg bid as BP [place] -- lisinopril 2.5 mg daily started on 6/20 but not received due to lower BP -> held per cardio  -- cont monitor I/O, daily wts - cont fluid and Na restriction  -- lipids 6/17/2020 acceptable total 110, HDL 52, LDL 47, trig 56  -- LFT's WNL, TFT 6/16/2020 WNL  3.  COPD with exacerbation -- apprec cardio assist -- solu-medrol 40 mg IV bid started, cont duonebs -- and updated as needed. Medications:  Reviewed    Infusion Medications   Scheduled Medications    furosemide  40 mg Intravenous BID    spironolactone  25 mg Oral BID    magnesium replacement protocol   Other RX Placeholder    ipratropium-albuterol  1 ampule Inhalation Q4H WA    carvedilol  3.125 mg Oral BID WC    enoxaparin  1 mg/kg Subcutaneous Q12H    [Held by provider] lisinopril  2.5 mg Oral Daily    sodium chloride flush  10 mL Intravenous 2 times per day    pantoprazole  40 mg Oral QAM AC    methylPREDNISolone  40 mg Intravenous Q12H     PRN Meds: ondansetron, acetaminophen, potassium chloride **OR** potassium alternative oral replacement **OR** potassium chloride      Intake/Output Summary (Last 24 hours) at 6/22/2020 1146  Last data filed at 6/22/2020 1267  Gross per 24 hour   Intake 824 ml   Output 4950 ml   Net -4126 ml       Diet:  DIET CARDIAC; Daily Fluid Restriction: 1800 ml    Exam:  BP (!) 107/56   Pulse 77   Temp 98 °F (36.7 °C) (Oral)   Resp 20   Ht 5' 5\" (1.651 m)   Wt 146 lb 14.4 oz (66.6 kg)   SpO2 91%   BMI 24.45 kg/m²     General appearance: No apparent distress, appears stated age and cooperative, up in chair on NC  HEENT: Pupils equal, round, and reactive to light. Conjunctivae/corneas clear. Neck: Supple, with full range of motion. Trachea midline. Respiratory:  Normal respiratory effort. Very diminished breath sounds w/o Clear to auscultation, bilaterally without Rales/Wheezes/Rhonchi. No respiratory distress or accessory muscle use. Cardiovascular: Regular rate and rhythm with normal S1/S2 without murmurs, rubs or gallops. Abdomen: Soft, non-tender, non-distended with normal bowel sounds. No rebound or guarding  Musculoskeletal: 1+ edema with diffuse wrinkling, FROM all 4 extremities  Skin: Skin color, texture, turgor normal.  No rashes or lesions. BLE with thickened skin  Neurologic:  Neurovascularly intact without any focal sensory/motor deficits.  Cranial resolution. **This report has been created using voice recognition software. It may contain minor errors which are inherent in voice recognition technology. **      Final report electronically signed by Dr. Romayne Buckles on 6/20/2020 11:07 AM          Diet: DIET CARDIAC; Daily Fluid Restriction: 1800 ml    Basurto: yes    Microbiology:  Guero 6/16 (-)    Urine cx 6/19 = contaminants    Tele review last 24 hrs:  SR, ST to 130 - 140's with SVT vs ?afib, 5 beats VT 6/21 at 1845, frequent PVCs    DVT prophylaxis: [x] Lovenox - therapeutic dose 1 mg/kg bid                                 [] SCDs                                 [] SQ Heparin                                 [] Encourage ambulation           [] Already on Anticoagulation     Disposition:    [x] Home       [] TCU       [] Rehab       [] Psych       [] SNF       [] [place]       [] Other-    Code Status: Full Code    PT/OT Eval Status: consulted      Electronically signed by Mian Rajan MD on 6/22/2020 at 11:46 AM when pt evaluated - final note filed late

## 2020-06-22 NOTE — PROGRESS NOTES
Cardiology Progress Note      Patient:  Mariam Hill  YOB: 1955  MRN: [id number]   Acct: [de-identified]  Admit Date:  6/19/2020  Primary Cardiologist: Mirella anna  Seen by Dr. Anthony Davis     Per prior cardiology consult note-  REASON FOR CONSULTATION:  CHF exacerbation and newly diagnosed  cardiomyopathy.     HISTORY OF PRESENT ILLNESS:  This is a 44-year-old  female  patient who has been transferred from [place] for further evaluation and  management of congestive heart failure, newly diagnosed actually;  pulmonary edema; and newly diagnosed cardiomyopathy. The patient was  admitted at [facility] on 06/17/2020 for CHF exacerbation and pulmonary  edema, and she has been diuresed there, but she was not getting better,  and so the patient was transferred to our medical setup. She has an  echocardiogram done there, the ejection fraction was low with ejection  fraction of around 20% to 25% with severe global hypokinesis with mild  elevation of right ventricular systolic pressure of 48 mmHg while the  patient is on congestive heart failure, so transferred here for further  evaluation and care. The patient stated that she has been having leg  swelling and shortness of breath over the last three months since  03/2020 and started progressively getting worse. She did not seek  medical advise because of the COVID-19, and she was having orthopnea of  marked degree she was almost sitting when in the night, and she has  significant paroxysmal nocturnal dyspnea and marked leg swelling. In  view of that, she went to the primary care physician and was sent to  [place]. She has been treated in [place] for a couple of days with no  significant improvement, hence transferred to our medical setup.     She lost significant amount of fluid still since her admission.    Overnight, she has been started on Lasix drip 5 mg per hour, and with  that, the patient has diuresed recently, and the patient stated that  today she is moderate tricuspid regurgitation. Pulmonic Valve  The pulmonic valve is normal in structure. Pericardial Effusion  No significant pericardial effusion is seen.     Miscellaneous  Diastology cannot be properly assessed due to the patients rhythm. Normal aortic root dimension. IVC Increased diameter and impaired or no inspiratory variation indicating  elevated RA filling pressure (i.e. CVP) .        Stress: IMPRESSION:  1. Abnormal myocardial perfusion study. There is a moderate perfusion  defect of moderate intensity in the anteroseptal, lateral, and apical  region(s) during stress imaging which is most consistent with but may be  due to artifact.     2.  Global left ventricular systolic function was abnormal with an EF of  26% without regional wall motion abnormalities.     3. No significant electrocardiographic evidence of myocardial ischemia  during EKG monitoring without significant associated arrhythmias.     Overall, these results are most consistent with an intermediate risk for  significant coronary artery disease.     Additional testing including cardiac catheterization may be indicated.           CHEY ESTRADA  D: 06/18/2020      Left Heart Cath:       Lab Data:    Cardiac Enzymes:  No results for input(s): CKTOTAL, CKMB, CKMBINDEX, TROPONINI in the last 72 hours.     CBC:   Lab Results   Component Value Date    WBC 7.6 06/22/2020    RBC 4.69 06/22/2020    HGB 12.5 06/22/2020    HCT 42.0 06/22/2020     06/22/2020       CMP:    Lab Results   Component Value Date     06/22/2020    K 4.6 06/22/2020    K 3.1 06/19/2020    CL 91 06/22/2020    CO2 39 06/22/2020    BUN 19 06/22/2020    CREATININE 0.5 06/22/2020    GFRAA >60 06/19/2020    LABGLOM >90 06/22/2020    GLUCOSE 125 06/22/2020    CALCIUM 7.4 06/22/2020       Hepatic Function Panel:    Lab Results   Component Value Date    ALKPHOS 79 06/20/2020    ALT 11 06/20/2020    AST 20 06/20/2020    PROT 5.5 06/20/2020    BILITOT 0.6 06/20/2020

## 2020-06-22 NOTE — PROGRESS NOTES
[facility]      Patient - Dede Sanchez   MRN -  [id number]   [facility]   - 1955      Date of Admission -  2020  4:04 PM  Date of evaluation -  2020  Room - 3B-38/038-A   Hospital Day - Krysten Padilla MD Primary Care Physician - Janak Gunter     Problem List      Active Hospital Problems    Diagnosis Date Noted    Acute combined systolic and diastolic congestive heart failure, NYHA class 4 ([address] [I50.41] 2020    Acute pulmonary embolism ([facility] [address] ? / Normal BLE Venous Ultrasound  / Suspect Radiology Overread [I26.99] 2020    Anasarca [R60.1] 2020     Reason for Consult    Management of hypoxic respiratory failure currently on HFNC    HPI   History Obtained From: Patient and electronic medical record. Dede Sanchez is a 72 y.o. female  was initially admitted under hospitalist service. Pulmonary medicine was consulted for further management of hypoxia/Hypoxic respiratory failure. She is currently on Hi Flow. The patient is a 72 y.o. female admitted to [facility] with SOB. She was transferred from [facility]. She was admitted on 2020 for CHF exacerbation and pulmonary edema. She reported that she is having worsening SOB and LE edema for the 3 months. She could not lay down in her bed she was sleeping in chair prior to her admission. She was sating 70% on RA in ER  She was followed by cardiology during her admission (Dr. Billie Hartman). Echo showed EF of 30-35 %with severe global hypokinesis. Biatrial enlargement noted and moderate to severe diastolic dysfunction. She had stress test done which showed: a moderate perfusion defect of moderate intensity in the anteroseptal, lateral, and apical region(s) during stress imaging which is most consistent with but may be  due to artifact. EF was 26%). She lost 27 pounds in her admission. CT scan was with ? PE: she was placed on therapeutic Lovenox. She has been on high flow O2. She is having cough: No    Subjective/Events Past 24 hours/ROS   Weaned off HF- currently stable on 6LPM  Not in any distress. Lasix gtt DC  No chest pain. Improving shortness of breath and leg edema    -Rest of the body systems were reviewed.      PMHx   Past Medical History      Diagnosis Date    A-fib [facility])     Acute respiratory failure with hypoxia ([address] from CHF 6/18/2020    CHF (congestive heart failure) (HCC)     COPD (chronic obstructive pulmonary disease) (HCC)       Past Surgical History        Procedure Laterality Date    CHOLECYSTECTOMY      HERNIA REPAIR      x2    TONSILLECTOMY       Meds    Current Medications    furosemide  40 mg Intravenous BID    spironolactone  25 mg Oral BID    magnesium replacement protocol   Other RX Placeholder    ipratropium-albuterol  1 ampule Inhalation Q4H WA    carvedilol  3.125 mg Oral BID WC    enoxaparin  1 mg/kg Subcutaneous Q12H    [Held by provider] lisinopril  2.5 mg Oral Daily    sodium chloride flush  10 mL Intravenous 2 times per day    pantoprazole  40 mg Oral QAM AC    methylPREDNISolone  40 mg Intravenous Q12H     ondansetron, acetaminophen, potassium chloride **OR** potassium alternative oral replacement **OR** potassium chloride  IV Drips/Infusions    Home Medications  Medications Prior to Admission: lisinopril (PRINIVIL;ZESTRIL) 2.5 MG tablet, Take 1 tablet by mouth daily  carvedilol (COREG) 3.125 MG tablet, Take 1 tablet by mouth 2 times daily (with meals)  furosemide (LASIX) 40 MG tablet, Take 1 tablet by mouth daily  Pediatric Multivitamins-Iron (FLINTSTONES COMPLETE PO), Take 1 tablet by mouth 2 times daily  Vitamins A & D (VITAMIN A & D PO), Take 1 capsule by mouth 3 times daily  calcium citrate-vitamin d (CALCIUM CITRATE + D) 250-200 MG-UNIT TABS, Take by mouth 2 times daily  Multiple Vitamins-Minerals (MULTIVITAMIN ADULT) CHEW, Take by mouth daily  Diet    DIET CARDIAC; Daily Fluid Restriction: 1800 comparison. If the patients reduced ejection fraction is a new finding, consider  additional ischemic cardiac testing if clinically indicated. Signature  ----------------------------------------------------------------------------   Electronically signed by Josephine Nicolas(Sonographer) on 06/17/2020 01:49 PM  ----------------------------------------------------------------------------     ----------------------------------------------------------------------------   Electronically signed by Tej Knutson(Interpreting physician) on   06/17/2020 04:49 PM  ----------------------------------------------------------------------------      Radiology    CXR  6/21/2020   XR CHEST (2 VW)   1. Cardiomegaly with pulmonary vascular congestion which has slightly improved since prior exam.   2. Small bilateral pleural effusions. Jun 20, 2020   PROCEDURE: XR CHEST PORTABLE   1. There is pulmonary vascular congestion with bibasilar atelectasis and/or infiltrates. In the right clinical setting these findings can be associated with congestive heart failure. There is no pleural effusion. Follow-up chest radiograph recommended to  confirm complete resolution. Jun 16, 2020   EXAMINATION: ONE XRAY VIEW OF THE CHEST   Findings of CHF/volume overload. CT Scans  (See actual reports for details)  Jun 16, 2020   EXAMINATION: CTA OF THE CHEST 6/16/2020 3:17 pm   Imaging findings of CHF decompensation. Subtle eccentric filling defect in right upper lobe branch pulmonary artery (series 601, image 98) is suspicious of small subsegmental pulmonary embolism. Assessment   -Acute hypoxic respiratory failure due to due to decompensated CHF Vs Bilateral pleural effusions Right > Left Vs small subsegmental pulmonary embolism in the right upper lobe. -Bilateral pleural effusions Right > Left- most likely due to CHF- Improved with diuresis. -Acute decompensated chronic systolic CHF.   -Pulmonary hypertension noted on Echocardiogram- Most likely due to Left heart disease Vs COPD Vs Pulmonary embolism Vs other etiologies.  -Chronic hx of tobacco smoking for 20years. She quit smoking 1 week back- High suspicion for COPD.  -COPD with exacerbation.  -Full Code  Plan   -HFNC continues this AM will trial 6LPM via NC today - RT communication order placed and discussed with Primary RN   -Continue optimization of CHF. Noted Lasix gtt DC  -Solumedrol to 40mg IV bid.  -Duonebs 3ml via nebs Q4h while awake  -Incentive spirometry Q4h as tolerated. -Titrate Oxygen to keep Spo2 >90%. -Acapella use encouraged.   -Bed side spirometry to evaluate the severity of COPD. - pending to be done  -DVT Prophylaxis: Lovenox. -PT/OT encouraged     Jen [place] educated about my impression and plan. She verbalizes understanding. Questions and concerns addressed. Meds and orders reviewed. Electronically signed by   ADAM Iglesias CNP on 6/22/2020 at 8:35 AM     Addendum by Dr. Natasha Anderson MD:  I have seen and examined the patient independently. Face to face evaluation and examination was performed. The above evaluation and note has been reviewed. Labs and radiographs were reviewed. I Have discussed with Ms. ABELARDO Iglesias CNP about this patient in detail. The above assessment and plan has been reviewed. Please see my modifications mentioned below. My modifications:  Feels better. Improving shortness of breath. Will change solumedrol to Prednisone in Am.     Juan El MD 6/22/2020 5:49 PM

## 2020-06-23 ENCOUNTER — APPOINTMENT (OUTPATIENT)
Dept: GENERAL RADIOLOGY | Age: 65
DRG: 286 | End: 2020-06-23
Attending: INTERNAL MEDICINE
Payer: MEDICARE

## 2020-06-23 LAB
ALBUMIN SERPL-MCNC: 3.5 G/DL (ref 3.5–5.1)
ALP BLD-CCNC: 80 U/L (ref 38–126)
ALT SERPL-CCNC: 14 U/L (ref 11–66)
ANION GAP SERPL CALCULATED.3IONS-SCNC: 12 MEQ/L (ref 8–16)
ANION GAP SERPL CALCULATED.3IONS-SCNC: 9 MEQ/L (ref 8–16)
APTT: 33.6 SECONDS (ref 22–38)
AST SERPL-CCNC: 17 U/L (ref 5–40)
BILIRUB SERPL-MCNC: 0.5 MG/DL (ref 0.3–1.2)
BUN BLDV-MCNC: 20 MG/DL (ref 7–22)
BUN BLDV-MCNC: 24 MG/DL (ref 7–22)
CALCIUM SERPL-MCNC: 8.3 MG/DL (ref 8.5–10.5)
CALCIUM SERPL-MCNC: 8.4 MG/DL (ref 8.5–10.5)
CHLORIDE BLD-SCNC: 90 MEQ/L (ref 98–111)
CHLORIDE BLD-SCNC: 91 MEQ/L (ref 98–111)
CO2: 35 MEQ/L (ref 23–33)
CO2: 37 MEQ/L (ref 23–33)
CREAT SERPL-MCNC: 0.5 MG/DL (ref 0.4–1.2)
CREAT SERPL-MCNC: 0.5 MG/DL (ref 0.4–1.2)
ERYTHROCYTE [DISTWIDTH] IN BLOOD BY AUTOMATED COUNT: 16.6 % (ref 11.5–14.5)
ERYTHROCYTE [DISTWIDTH] IN BLOOD BY AUTOMATED COUNT: 53.1 FL (ref 35–45)
GFR SERPL CREATININE-BSD FRML MDRD: > 90 ML/MIN/1.73M2
GFR SERPL CREATININE-BSD FRML MDRD: > 90 ML/MIN/1.73M2
GLUCOSE BLD-MCNC: 119 MG/DL (ref 70–108)
GLUCOSE BLD-MCNC: 160 MG/DL (ref 70–108)
HCT VFR BLD CALC: 46.9 % (ref 37–47)
HEMOGLOBIN: 14.1 GM/DL (ref 12–16)
INR BLD: 1.04 (ref 0.85–1.13)
MAGNESIUM: 2.2 MG/DL (ref 1.6–2.4)
MAGNESIUM: 2.4 MG/DL (ref 1.6–2.4)
MCH RBC QN AUTO: 26.7 PG (ref 26–33)
MCHC RBC AUTO-ENTMCNC: 30.1 GM/DL (ref 32.2–35.5)
MCV RBC AUTO: 88.8 FL (ref 81–99)
PLATELET # BLD: 182 THOU/MM3 (ref 130–400)
PMV BLD AUTO: 12.9 FL (ref 9.4–12.4)
POTASSIUM REFLEX MAGNESIUM: 4.4 MEQ/L (ref 3.5–5.2)
POTASSIUM SERPL-SCNC: 4.8 MEQ/L (ref 3.5–5.2)
RBC # BLD: 5.28 MILL/MM3 (ref 4.2–5.4)
SODIUM BLD-SCNC: 136 MEQ/L (ref 135–145)
SODIUM BLD-SCNC: 138 MEQ/L (ref 135–145)
TOTAL PROTEIN: 6.1 G/DL (ref 6.1–8)
WBC # BLD: 8.8 THOU/MM3 (ref 4.8–10.8)

## 2020-06-23 PROCEDURE — 6360000002 HC RX W HCPCS: Performed by: NURSE PRACTITIONER

## 2020-06-23 PROCEDURE — APPSS30 APP SPLIT SHARED TIME 16-30 MINUTES: Performed by: NURSE PRACTITIONER

## 2020-06-23 PROCEDURE — 86901 BLOOD TYPING SEROLOGIC RH(D): CPT

## 2020-06-23 PROCEDURE — 99232 SBSQ HOSP IP/OBS MODERATE 35: CPT | Performed by: INTERNAL MEDICINE

## 2020-06-23 PROCEDURE — 83735 ASSAY OF MAGNESIUM: CPT

## 2020-06-23 PROCEDURE — 2580000003 HC RX 258: Performed by: OPHTHALMOLOGY

## 2020-06-23 PROCEDURE — 80053 COMPREHEN METABOLIC PANEL: CPT

## 2020-06-23 PROCEDURE — 97530 THERAPEUTIC ACTIVITIES: CPT

## 2020-06-23 PROCEDURE — 99232 SBSQ HOSP IP/OBS MODERATE 35: CPT | Performed by: FAMILY MEDICINE

## 2020-06-23 PROCEDURE — 36415 COLL VENOUS BLD VENIPUNCTURE: CPT

## 2020-06-23 PROCEDURE — 6370000000 HC RX 637 (ALT 250 FOR IP): Performed by: OPHTHALMOLOGY

## 2020-06-23 PROCEDURE — 97110 THERAPEUTIC EXERCISES: CPT

## 2020-06-23 PROCEDURE — 94640 AIRWAY INHALATION TREATMENT: CPT

## 2020-06-23 PROCEDURE — 99232 SBSQ HOSP IP/OBS MODERATE 35: CPT | Performed by: NURSE PRACTITIONER

## 2020-06-23 PROCEDURE — 6370000000 HC RX 637 (ALT 250 FOR IP): Performed by: NURSE PRACTITIONER

## 2020-06-23 PROCEDURE — 86900 BLOOD TYPING SEROLOGIC ABO: CPT

## 2020-06-23 PROCEDURE — 2140000000 HC CCU INTERMEDIATE R&B

## 2020-06-23 PROCEDURE — 71046 X-RAY EXAM CHEST 2 VIEWS: CPT

## 2020-06-23 PROCEDURE — 97116 GAIT TRAINING THERAPY: CPT

## 2020-06-23 PROCEDURE — 85610 PROTHROMBIN TIME: CPT

## 2020-06-23 PROCEDURE — 93307 TTE W/O DOPPLER COMPLETE: CPT

## 2020-06-23 PROCEDURE — 94760 N-INVAS EAR/PLS OXIMETRY 1: CPT

## 2020-06-23 PROCEDURE — 86850 RBC ANTIBODY SCREEN: CPT

## 2020-06-23 PROCEDURE — 85730 THROMBOPLASTIN TIME PARTIAL: CPT

## 2020-06-23 PROCEDURE — 2700000000 HC OXYGEN THERAPY PER DAY

## 2020-06-23 PROCEDURE — 6360000002 HC RX W HCPCS: Performed by: OPHTHALMOLOGY

## 2020-06-23 PROCEDURE — 85027 COMPLETE CBC AUTOMATED: CPT

## 2020-06-23 RX ORDER — PREDNISONE 20 MG/1
40 TABLET ORAL DAILY
Status: COMPLETED | OUTPATIENT
Start: 2020-06-23 | End: 2020-06-25

## 2020-06-23 RX ORDER — ASPIRIN 325 MG
325 TABLET ORAL ONCE
Status: COMPLETED | OUTPATIENT
Start: 2020-06-23 | End: 2020-06-24

## 2020-06-23 RX ORDER — PREDNISONE 10 MG/1
10 TABLET ORAL DAILY
Status: DISCONTINUED | OUTPATIENT
Start: 2020-07-02 | End: 2020-06-25 | Stop reason: HOSPADM

## 2020-06-23 RX ORDER — ALBUTEROL SULFATE 2.5 MG/3ML
2.5 SOLUTION RESPIRATORY (INHALATION)
Status: DISCONTINUED | OUTPATIENT
Start: 2020-06-23 | End: 2020-06-23

## 2020-06-23 RX ORDER — ALBUTEROL SULFATE 90 UG/1
2 AEROSOL, METERED RESPIRATORY (INHALATION) EVERY 6 HOURS PRN
Qty: 1 INHALER | Refills: 11 | Status: SHIPPED | OUTPATIENT
Start: 2020-06-23 | End: 2021-05-13 | Stop reason: SDUPTHER

## 2020-06-23 RX ORDER — PREDNISONE 20 MG/1
20 TABLET ORAL DAILY
Status: DISCONTINUED | OUTPATIENT
Start: 2020-06-29 | End: 2020-06-25 | Stop reason: HOSPADM

## 2020-06-23 RX ORDER — NITROGLYCERIN 0.4 MG/1
0.4 TABLET SUBLINGUAL EVERY 5 MIN PRN
Status: DISCONTINUED | OUTPATIENT
Start: 2020-06-23 | End: 2020-06-25 | Stop reason: HOSPADM

## 2020-06-23 RX ORDER — SODIUM CHLORIDE 9 MG/ML
INJECTION, SOLUTION INTRAVENOUS CONTINUOUS
Status: DISCONTINUED | OUTPATIENT
Start: 2020-06-23 | End: 2020-06-25 | Stop reason: HOSPADM

## 2020-06-23 RX ORDER — ALBUTEROL SULFATE 2.5 MG/3ML
2.5 SOLUTION RESPIRATORY (INHALATION)
Status: DISCONTINUED | OUTPATIENT
Start: 2020-06-23 | End: 2020-06-25 | Stop reason: HOSPADM

## 2020-06-23 RX ORDER — SODIUM CHLORIDE 0.9 % (FLUSH) 0.9 %
10 SYRINGE (ML) INJECTION EVERY 12 HOURS SCHEDULED
Status: DISCONTINUED | OUTPATIENT
Start: 2020-06-23 | End: 2020-06-24

## 2020-06-23 RX ORDER — SODIUM CHLORIDE 0.9 % (FLUSH) 0.9 %
10 SYRINGE (ML) INJECTION PRN
Status: DISCONTINUED | OUTPATIENT
Start: 2020-06-23 | End: 2020-06-24

## 2020-06-23 RX ORDER — DIPHENHYDRAMINE HYDROCHLORIDE 50 MG/ML
50 INJECTION INTRAMUSCULAR; INTRAVENOUS ONCE
Status: DISCONTINUED | OUTPATIENT
Start: 2020-06-23 | End: 2020-06-25 | Stop reason: HOSPADM

## 2020-06-23 RX ADMIN — GLYCOPYRROLATE AND FORMOTEROL FUMARATE 2 PUFF: 9; 4.8 AEROSOL, METERED RESPIRATORY (INHALATION) at 22:18

## 2020-06-23 RX ADMIN — ENOXAPARIN SODIUM 80 MG: 80 INJECTION SUBCUTANEOUS at 07:23

## 2020-06-23 RX ADMIN — PREDNISONE 40 MG: 20 TABLET ORAL at 09:20

## 2020-06-23 RX ADMIN — ALBUTEROL SULFATE 2.5 MG: 2.5 SOLUTION RESPIRATORY (INHALATION) at 22:15

## 2020-06-23 RX ADMIN — ALBUTEROL SULFATE 2.5 MG: 2.5 SOLUTION RESPIRATORY (INHALATION) at 18:17

## 2020-06-23 RX ADMIN — FUROSEMIDE 40 MG: 10 INJECTION, SOLUTION INTRAMUSCULAR; INTRAVENOUS at 09:20

## 2020-06-23 RX ADMIN — PANTOPRAZOLE SODIUM 40 MG: 40 TABLET, DELAYED RELEASE ORAL at 07:23

## 2020-06-23 RX ADMIN — Medication 10 ML: at 22:42

## 2020-06-23 RX ADMIN — ALBUTEROL SULFATE 2.5 MG: 2.5 SOLUTION RESPIRATORY (INHALATION) at 12:06

## 2020-06-23 RX ADMIN — CARVEDILOL 3.12 MG: 3.12 TABLET, FILM COATED ORAL at 09:20

## 2020-06-23 RX ADMIN — Medication 10 ML: at 09:21

## 2020-06-23 RX ADMIN — IPRATROPIUM BROMIDE AND ALBUTEROL SULFATE 1 AMPULE: .5; 3 SOLUTION RESPIRATORY (INHALATION) at 08:28

## 2020-06-23 RX ADMIN — ENOXAPARIN SODIUM 80 MG: 80 INJECTION SUBCUTANEOUS at 18:24

## 2020-06-23 ASSESSMENT — PAIN SCALES - GENERAL
PAINLEVEL_OUTOF10: 0

## 2020-06-23 NOTE — PROGRESS NOTES
Nutrition Education    Type and Reason for Visit: Initial, Consult(verbal consult CHF education from nursing staff)    Nutrition Assessment:    Pt admitted with CHF. Pt seen, mentions not adding salt to foods, good appetite most meals %. Pt unsure usual wt. Ht: 5'5 wt: 135# 1.6oz BMI=22.5. Note: Admit wt of 165# 8oz 96/19) was when pt had +3 edema noted. Willing to switch to decaf coffee ( was used to drinking 4 cups regular coffee/day). · Verbally reviewed information with pt on CHF 1800ml fluid restriction diet. Discouraged restaurant & fast foods as well as processed foods. Gave seasoning alternatives. · Written educational materials provided. · Contact name and number provided. · Refer to Patient Education activity for more details.     Electronically signed by Marry Rosales RD, XIMENA on 6/23/20 at 11:05 AM EDT    Contact Number: [phone]

## 2020-06-23 NOTE — PROGRESS NOTES
She has been on high flow O2. She is having cough: No    Subjective/Events Past 24 hours/ROS   Currently stable on 6LPM- 92%  Afebrile  Not in any distress. No chest pain. Improving shortness of breath and leg edema    -Rest of the body systems were reviewed.      PMHx   Past Medical History      Diagnosis Date    A-fib [facility])     Acute respiratory failure with hypoxia ([address] from CHF 6/18/2020    CHF (congestive heart failure) (HCC)     [facility]) [facility]       Past Surgical History        Procedure Laterality Date    CHOLECYSTECTOMY      HERNIA REPAIR      x2    TONSILLECTOMY       Meds    Current Medications    furosemide  40 mg Intravenous BID    spironolactone  25 mg Oral BID    magnesium replacement protocol   Other RX Placeholder    ipratropium-albuterol  1 ampule Inhalation Q4H WA    carvedilol  3.125 mg Oral BID WC    enoxaparin  1 mg/kg Subcutaneous Q12H    [Held by provider] lisinopril  2.5 mg Oral Daily    sodium chloride flush  10 mL Intravenous 2 times per day    pantoprazole  40 mg Oral QAM AC    methylPREDNISolone  40 mg Intravenous Q12H     ondansetron, acetaminophen, potassium chloride **OR** potassium alternative oral replacement **OR** potassium chloride  IV Drips/Infusions    Home Medications  Medications Prior to Admission: lisinopril (PRINIVIL;ZESTRIL) 2.5 MG tablet, Take 1 tablet by mouth daily  carvedilol (COREG) 3.125 MG tablet, Take 1 tablet by mouth 2 times daily (with meals)  furosemide (LASIX) 40 MG tablet, Take 1 tablet by mouth daily  Pediatric Multivitamins-Iron (FLINTSTONES COMPLETE PO), Take 1 tablet by mouth 2 times daily  Vitamins A & D (VITAMIN A & D PO), Take 1 capsule by mouth 3 times daily  calcium citrate-vitamin d (CALCIUM CITRATE + D) 250-200 MG-UNIT TABS, Take by mouth 2 times daily  Multiple Vitamins-Minerals (MULTIVITAMIN ADULT) CHEW, Take by mouth daily  Diet    DIET CARDIAC; Daily Fluid Restriction: 1800 ml  Allergies independently. Face to face evaluation and examination was performed. The above evaluation and note has been reviewed. Labs and radiographs were reviewed. I Have discussed with Ms. ADAM Lai- CNP about this patient in detail. The above assessment and plan has been reviewed. Please see my modifications mentioned below. My modifications:  She is on 6LPM via nasal cannula. Taper steriods. Severe COPD per bed side spirometry.  -Will start patient on Bevespi Aerosphere 9/4.8mcg per actuation, 2 puffs via inhalation BID.      Ledy Tidwell MD 6/23/2020 5:08 PM

## 2020-06-23 NOTE — PROGRESS NOTES
movement disorder noted    Medications:    predniSONE  40 mg Oral Daily    Followed by   Rox Wagner ON 2020] predniSONE  30 mg Oral Daily    Followed by   Rox Wagner ON 2020] predniSONE  20 mg Oral Daily    Followed by   Rox Wagner ON 2020] predniSONE  10 mg Oral Daily    glycopyrrolate-formoterol  2 puff Inhalation BID    albuterol  2.5 mg Nebulization Q4H WA    magnesium replacement protocol   Other RX Placeholder    carvedilol  3.125 mg Oral BID     enoxaparin  1 mg/kg Subcutaneous Q12H    [Held by provider] lisinopril  2.5 mg Oral Daily    sodium chloride flush  10 mL Intravenous 2 times per day    pantoprazole  40 mg Oral QAM AC       ondansetron, 4 mg, Q6H PRN  acetaminophen, 650 mg, Q4H PRN  potassium chloride, 40 mEq, PRN    Or  potassium alternative oral replacement, 40 mEq, PRN    Or  potassium chloride, 10 mEq, PRN        Diagnostics:  EK2020 10:54:55 [address] [facility] ROUTINE RETRIEVAL  ** Poor data quality, interpretation may be adversely affected  Sinus tachycardia with Premature atrial complexes with Aberrant conduction  Nonspecific T wave abnormality  Abnormal ECG  When compared with ECG of 2020 13:00,  Premature ventricular complexes are no longer Present  Nonspecific T wave abnormality now evident in Inferior leads  Nonspecific T wave abnormality, worse in Lateral leads  Confirmed by La Ramsey MD ([id number]) on 2020 6:07:14 PM    Echo:    Electronically signed by Tej Knutson(Interpreting physician) on   2020 04:49 PM  ----------------------------------------------------------------------------  FINDINGS  Left Atrium  The left atrium is severely dilated (>40) with a left atrial volume index of  41 ml/m2. Left Ventricle  Global left ventricular systolic function appears severely reduced with an  estimated ejection fraction of 20-25%. Mildly increased left ventricular wall thickness with a normal left  ventricular cavity size.   Right

## 2020-06-23 NOTE — PROGRESS NOTES
in to speak with patient about POC during session. PAIN: denies    OBJECTIVE:  Bed Mobility:  Supine to Sit: Modified Independent, with head of bed raised    Transfers:  Sit to Stand: Stand By Assistance  Stand to Sit:Stand By Assistance    Ambulation:  Stand By Assistance, [employer], X 1  Distance: ~120ft x1  Surface: Level Tile  Device:Rolling Walker  Gait Deviations:  Slow Kelly, Decreased Step Length Bilaterally, Decreased Gait Speed, Decreased Heel Strike Bilaterally and steady, no LOB    Balance:  Dynamic Standing Balance: Stand By Assistance    Exercise:  Patient was guided in 1 set(s) 12 reps of exercise to both lower extremities. Ankle pumps, Glut sets, Quad sets, Heelslides, Hip abduction/adduction and Straight leg raises. Exercises were completed for increased independence with functional mobility. Functional Outcome Measures: Completed  AM-PAC Inpatient Mobility without Stair Climbing Raw Score : 17  -PAC Inpatient without Stair Climbing T-Scale Score : 48.47    ASSESSMENT:  Assessment: Patient progressing toward established goals. Activity Tolerance:  Patient tolerance of  treatment: good.       Equipment Recommendations:Equipment Needed: (continue to assess)  Discharge Recommendations:  Continue to assess pending progress, Patient would benefit from continued therapy after discharge    Plan: Times per week: 4-5x GM  Times per day: Daily  Current Treatment Recommendations: Strengthening, Balance Training, Transfer Training, Endurance Training, Gait Training, Neuromuscular Re-education, Home Exercise Program, Patient/Caregiver Education & Training, Functional Mobility Training, Stair training, Safety Education & Training    Patient Education  Patient Education: Plan of Care, [employer] Mobility, Transfers, Gait, Up in Chair for All Meals, Verbal Exercise Instruction    Goals:  Patient goals : go home  Short term goals  Time Frame for Short term goals: at discharge  Short term goal 1: Patient will complete sit < > stand with modified independence to stand to ambulate safely. Short term goal 2: [place] will ambulate 80' with no AD and supervision to [facility]. Long term goals  Time Frame for Long term goals : N/A due to short estimated length of stay    Following session, patient left in safe position with all fall risk precautions in place.

## 2020-06-24 ENCOUNTER — APPOINTMENT (OUTPATIENT)
Dept: CARDIAC CATH/INVASIVE PROCEDURES | Age: 65
DRG: 286 | End: 2020-06-24
Attending: INTERNAL MEDICINE
Payer: MEDICARE

## 2020-06-24 LAB
ABO: NORMAL
ANTIBODY SCREEN: NORMAL
COLLECTED BY:: ABNORMAL
COLLECTED BY:: ABNORMAL
EKG ATRIAL RATE: 79 BPM
EKG P AXIS: 23 DEGREES
EKG P-R INTERVAL: 132 MS
EKG Q-T INTERVAL: 370 MS
EKG QRS DURATION: 82 MS
EKG QTC CALCULATION (BAZETT): 424 MS
EKG R AXIS: -23 DEGREES
EKG T AXIS: 83 DEGREES
EKG VENTRICULAR RATE: 79 BPM
POC O2 SATURATION: 68 % (ref 94–97)
POC O2 SATURATION: 91 % (ref 94–97)
RH FACTOR: NORMAL
SOURCE, BLOOD GAS: ABNORMAL
SOURCE, BLOOD GAS: ABNORMAL

## 2020-06-24 PROCEDURE — 6370000000 HC RX 637 (ALT 250 FOR IP): Performed by: NURSE PRACTITIONER

## 2020-06-24 PROCEDURE — 6360000002 HC RX W HCPCS: Performed by: NURSE PRACTITIONER

## 2020-06-24 PROCEDURE — 93460 R&L HRT ART/VENTRICLE ANGIO: CPT

## 2020-06-24 PROCEDURE — C1887 CATHETER, GUIDING: HCPCS

## 2020-06-24 PROCEDURE — 6360000002 HC RX W HCPCS: Performed by: OPHTHALMOLOGY

## 2020-06-24 PROCEDURE — 2500000003 HC RX 250 WO HCPCS

## 2020-06-24 PROCEDURE — 94640 AIRWAY INHALATION TREATMENT: CPT

## 2020-06-24 PROCEDURE — APPSS30 APP SPLIT SHARED TIME 16-30 MINUTES: Performed by: NURSE PRACTITIONER

## 2020-06-24 PROCEDURE — 2140000000 HC CCU INTERMEDIATE R&B

## 2020-06-24 PROCEDURE — 2580000003 HC RX 258: Performed by: NURSE PRACTITIONER

## 2020-06-24 PROCEDURE — 4A023N8 MEASUREMENT OF CARDIAC SAMPLING AND PRESSURE, BILATERAL, PERCUTANEOUS APPROACH: ICD-10-PCS | Performed by: INTERNAL MEDICINE

## 2020-06-24 PROCEDURE — 6370000000 HC RX 637 (ALT 250 FOR IP): Performed by: OPHTHALMOLOGY

## 2020-06-24 PROCEDURE — 93460 R&L HRT ART/VENTRICLE ANGIO: CPT | Performed by: INTERNAL MEDICINE

## 2020-06-24 PROCEDURE — 2709999900 HC NON-CHARGEABLE SUPPLY

## 2020-06-24 PROCEDURE — 6360000002 HC RX W HCPCS

## 2020-06-24 PROCEDURE — 97110 THERAPEUTIC EXERCISES: CPT

## 2020-06-24 PROCEDURE — 99232 SBSQ HOSP IP/OBS MODERATE 35: CPT | Performed by: INTERNAL MEDICINE

## 2020-06-24 PROCEDURE — 6360000004 HC RX CONTRAST MEDICATION: Performed by: FAMILY MEDICINE

## 2020-06-24 PROCEDURE — 99232 SBSQ HOSP IP/OBS MODERATE 35: CPT | Performed by: FAMILY MEDICINE

## 2020-06-24 PROCEDURE — 82810 BLOOD GASES O2 SAT ONLY: CPT

## 2020-06-24 PROCEDURE — B2111ZZ FLUOROSCOPY OF MULTIPLE CORONARY ARTERIES USING LOW OSMOLAR CONTRAST: ICD-10-PCS | Performed by: INTERNAL MEDICINE

## 2020-06-24 PROCEDURE — B2151ZZ FLUOROSCOPY OF LEFT HEART USING LOW OSMOLAR CONTRAST: ICD-10-PCS | Performed by: INTERNAL MEDICINE

## 2020-06-24 PROCEDURE — 2580000003 HC RX 258: Performed by: INTERNAL MEDICINE

## 2020-06-24 PROCEDURE — 94761 N-INVAS EAR/PLS OXIMETRY MLT: CPT

## 2020-06-24 PROCEDURE — C1894 INTRO/SHEATH, NON-LASER: HCPCS

## 2020-06-24 PROCEDURE — 2700000000 HC OXYGEN THERAPY PER DAY

## 2020-06-24 PROCEDURE — 99232 SBSQ HOSP IP/OBS MODERATE 35: CPT | Performed by: NURSE PRACTITIONER

## 2020-06-24 PROCEDURE — C1769 GUIDE WIRE: HCPCS

## 2020-06-24 PROCEDURE — 97116 GAIT TRAINING THERAPY: CPT

## 2020-06-24 PROCEDURE — 93010 ELECTROCARDIOGRAM REPORT: CPT | Performed by: INTERNAL MEDICINE

## 2020-06-24 PROCEDURE — 93005 ELECTROCARDIOGRAM TRACING: CPT | Performed by: NURSE PRACTITIONER

## 2020-06-24 RX ORDER — SODIUM CHLORIDE 0.9 % (FLUSH) 0.9 %
10 SYRINGE (ML) INJECTION EVERY 12 HOURS SCHEDULED
Status: DISCONTINUED | OUTPATIENT
Start: 2020-06-24 | End: 2020-06-25 | Stop reason: HOSPADM

## 2020-06-24 RX ORDER — ACETAMINOPHEN 325 MG/1
650 TABLET ORAL EVERY 4 HOURS PRN
Status: DISCONTINUED | OUTPATIENT
Start: 2020-06-24 | End: 2020-06-24

## 2020-06-24 RX ORDER — SODIUM CHLORIDE 0.9 % (FLUSH) 0.9 %
10 SYRINGE (ML) INJECTION PRN
Status: DISCONTINUED | OUTPATIENT
Start: 2020-06-24 | End: 2020-06-25 | Stop reason: HOSPADM

## 2020-06-24 RX ADMIN — PANTOPRAZOLE SODIUM 40 MG: 40 TABLET, DELAYED RELEASE ORAL at 09:28

## 2020-06-24 RX ADMIN — ALBUTEROL SULFATE 2.5 MG: 2.5 SOLUTION RESPIRATORY (INHALATION) at 13:30

## 2020-06-24 RX ADMIN — CARVEDILOL 3.12 MG: 3.12 TABLET, FILM COATED ORAL at 09:29

## 2020-06-24 RX ADMIN — SODIUM CHLORIDE: 9 INJECTION, SOLUTION INTRAVENOUS at 10:13

## 2020-06-24 RX ADMIN — GLYCOPYRROLATE AND FORMOTEROL FUMARATE 2 PUFF: 9; 4.8 AEROSOL, METERED RESPIRATORY (INHALATION) at 21:49

## 2020-06-24 RX ADMIN — PREDNISONE 40 MG: 20 TABLET ORAL at 09:28

## 2020-06-24 RX ADMIN — CARVEDILOL 3.12 MG: 3.12 TABLET, FILM COATED ORAL at 17:26

## 2020-06-24 RX ADMIN — Medication 10 ML: at 09:29

## 2020-06-24 RX ADMIN — ALBUTEROL SULFATE 2.5 MG: 2.5 SOLUTION RESPIRATORY (INHALATION) at 21:46

## 2020-06-24 RX ADMIN — ASPIRIN 325 MG: 325 TABLET, FILM COATED ORAL at 10:13

## 2020-06-24 RX ADMIN — ENOXAPARIN SODIUM 80 MG: 80 INJECTION SUBCUTANEOUS at 20:36

## 2020-06-24 RX ADMIN — IOPAMIDOL 60 ML: 755 INJECTION, SOLUTION INTRAVENOUS at 11:10

## 2020-06-24 RX ADMIN — Medication 10 ML: at 20:36

## 2020-06-24 RX ADMIN — GLYCOPYRROLATE AND FORMOTEROL FUMARATE 2 PUFF: 9; 4.8 AEROSOL, METERED RESPIRATORY (INHALATION) at 09:49

## 2020-06-24 RX ADMIN — ALBUTEROL SULFATE 2.5 MG: 2.5 SOLUTION RESPIRATORY (INHALATION) at 09:45

## 2020-06-24 RX ADMIN — ACETAMINOPHEN 650 MG: 325 TABLET ORAL at 13:11

## 2020-06-24 ASSESSMENT — PAIN SCALES - GENERAL
PAINLEVEL_OUTOF10: 0

## 2020-06-24 NOTE — PROGRESS NOTES
wts - cont fluid and Na restriction  -- LifeVest at d/c  -- lipids 6/17/2020 acceptable total 110, HDL 52, LDL 47, trig 56  -- LFT's WNL, TFT 6/16/2020 WNL  3. COPD with exacerbation -- apprec cardio assist -- solu-medrol 40 mg IV bid started on admission and changed to prednisone taper 6/23 -- duonebs changed to albuterol/bevespi 6/23 per pulm  -- needs PFT outpt -- weaned off high flow to 6L O2 as of 6/24/2020  -> cont wean as able. Counseled on smoking cessation  -- home O2 eval at d/c  4. Metabolic alkalosis -- per BMP -- improving 6/24 - likely due to diuresis and resp status -- if worsens may need diuril/acetolamide for further diuresis  5. Hypokalemia -- due to diuresis -- replaced, monitor closely with aggressive diuresis -- Mg WNL 2.2 6/22   6. Borderline hypotension -- due to diuresis and CM meds -> receiving coreg as needed and lisinopril held due to low BP) -- ?low perfusion with low EF -- cont monitor closely - ?need midodrine  7. Bilateral R>L pleural effusions -- due to CHF - see above -- apprec pulm and cardio assist --> improving - last CXR 6/21/2020 = CM with pulm vasc congestion slightly improved from prior and small bilateral effusions and cont to improve per CXR 6/23  -- not enough fluid for thoracentesis 6/22 thus canceled   8. Possible acute RUL PE -- per CTA at [facility] [address] 6/16/2020 however per notes from [place] [address] [address] that was not a clear image on films -> ??repeat CTA chest before committing pt to long term anticoagulation -> ??pulm assist  -- cont lovenox 1 mg/kg bid for now until determination of procedures made -> ?transition to eliquis  -- BLE dopplers 6/17 (-) DVT  9. Arrhythmias -- Per cardio -- ? PAF and at risk for PAF with severely dilated bilateral atria -- BB as BP [place] - also with ventricular ectopy with PVC's and NSVT 6/21 -- coreg as BP [place] -- monitor tele closely  -- Echo at [facility] [address] 6/17/2020 = EF 20-25%, severe biatrial enlargement, RV dilation, mod pulm HTN RVSP 48 mmHg, focal sensory/motor deficits. Cranial nerves: II-XII intact, grossly non-focal.  Psychiatric: Alert and oriented, thought content appropriate, normal insight  Capillary Refill: Brisk,< 3 seconds   Peripheral Pulses: diminished due to edema, equal bilaterally       All labs reviewed and interpreted by me:  Labs:   Recent Labs     06/22/20  0400 06/23/20  2300   WBC 7.6 8.8   HGB 12.5 14.1   HCT 42.0 46.9    182     Recent Labs     06/22/20  0400 06/23/20  0502 06/23/20  2300    138 136   K 4.6 4.8 4.4   CL 91* 91* 90*   CO2 39* 35* 37*   BUN 19 20 24*   CREATININE 0.5 0.5 0.5   CALCIUM 7.4* 8.4* 8.3*     Recent Labs     06/23/20  0502   AST 17   ALT 14   BILITOT 0.5   ALKPHOS 80     Recent Labs     06/23/20  2300   INR 1.04     No results for input(s): CKTOTAL, TROPONINT in the last 72 hours. Urinalysis:      Lab Results   Component Value Date    NITRU NEGATIVE 06/19/2020    WBCUA 10-15 06/19/2020    BACTERIA NONE SEEN 06/19/2020    RBCUA 15-25 06/19/2020    BLOODU SMALL 06/19/2020    SPECGRAV 1.015 06/16/2020    GLUCOSEU NEGATIVE 06/19/2020       All radiology images and reports reviewed and interpreted by me:  Radiology:  XR CHEST STANDARD (2 VW)   Final Result   1. Mild cardiomegaly. A metallic wire fragment projects over the left main pulmonary artery, once the significance. Multiple vascular clips are present in the upper abdomen from prior surgery. 2. Moderate bibasilar atelectasis/pneumonia. Small bilateral pleural effusions. Overall appearance of chest improved from prior. **This report has been created using voice recognition software. It may contain minor errors which are inherent in voice recognition technology. **      Final report electronically signed by Dr. Raman Lopez on 6/23/2020 8:58 AM      XR CHEST STANDARD (2 VW)   Final Result      1.  Cardiomegaly with pulmonary vascular congestion which has slightly improved since prior exam.   2. Small bilateral pleural effusions. **This report has been created using voice recognition software. It may contain minor errors which are inherent in voice recognition technology. **      Final report electronically signed by Dr Miguelangel Gardner on 6/21/2020 1:30 PM      XR CHEST PORTABLE   Final Result   1. There is pulmonary vascular congestion with bibasilar atelectasis and/or infiltrates. In the right clinical setting these findings can be associated with congestive heart failure. There is no pleural effusion. Follow-up chest radiograph recommended to    confirm complete resolution. **This report has been created using voice recognition software. It may contain minor errors which are inherent in voice recognition technology. **      Final report electronically signed by Dr. Epifanio Chester on 6/20/2020 11:07 AM          Diet: Diet NPO Time Specified    Basurto: yes    Microbiology:  Guero 6/16 (-)    Urine cx 6/19 = contaminants    Tele review last 24 hrs:  SR, ST/SA to 155 at 6:56 AM 6/23 - ?runs of afib vs MAT vs ST with frequent PACs    DVT prophylaxis: [x] Lovenox - therapeutic dose 1 mg/kg bid                                 [] SCDs                                 [] SQ Heparin                                 [] Encourage ambulation           [] Already on Anticoagulation     Disposition:    [x] Home       [] TCU       [] Rehab       [] Psych       [] SNF       [] [place]       [] Other-    Code Status: Full Code    PT/OT Eval Status: consulted    Electronically signed by Peggie Felty, MD on 6/24/2020 at 9:29 AM

## 2020-06-24 NOTE — PRE SEDATION
20 mg, Oral, Daily **FOLLOWED BY** [START ON 7/2/2020] predniSONE (DELTASONE) tablet 10 mg, 10 mg, Oral, Daily, ADAM Wang CNP    glycopyrrolate-formoterol (BEVESPI) 9-4.8 MCG/ACT inhaler 2 puff, 2 puff, Inhalation, BID, ADAM Wang - CNP, 2 puff at 06/24/20 0949    albuterol (PROVENTIL) nebulizer solution 2.5 mg, 2.5 mg, Nebulization, Q4H WA, ADAM Wang - CNP, 2.5 mg at 06/24/20 0945    0.9 % sodium chloride infusion, , Intravenous, Continuous, Dedrick Hurst APRN - CNP, Last Rate: 50 mL/hr at 06/24/20 1013    sodium chloride flush 0.9 % injection 10 mL, 10 mL, Intravenous, 2 times per day, Dedrick Natali, APRN - CNP, 10 mL at 06/24/20 0929    sodium chloride flush 0.9 % injection 10 mL, 10 mL, Intravenous, PRN, Dedrick Natali, APRN - CNP    nitroGLYCERIN (NITROSTAT) SL tablet 0.4 mg, 0.4 mg, Sublingual, Q5 Min PRN, Dedrick Hurst, APRN - CNP    diphenhydrAMINE (BENADRYL) injection 50 mg, 50 mg, Intravenous, Once, Sanjana Cruz APRN - CNP    hydrocortisone sodium succinate PF (SOLU-CORTEF) injection 200 mg, 200 mg, Intravenous, Once, Sanjana Cruz APRN - CNP    magnesium replacement protocol, , Other, RX Placeholder, [employer], PA-C    ondansetron (ZOFRAN) injection 4 mg, 4 mg, Intravenous, Q6H PRN, Hemalatha Méndez MD    acetaminophen (TYLENOL) tablet 650 mg, 650 mg, Oral, Q4H PRN, Hemalatha Méndez MD, 650 mg at 06/21/20 0906    carvedilol (COREG) tablet 3.125 mg, 3.125 mg, Oral, BID WC, Dedrick Natali, APRN - CNP, 3.125 mg at 06/24/20 0929    enoxaparin (LOVENOX) injection 80 mg, 1 mg/kg, Subcutaneous, Q12H, Hemalatha Méndez MD, 80 mg at 06/23/20 1824    [Held by provider] lisinopril (PRINIVIL;ZESTRIL) tablet 2.5 mg, 2.5 mg, Oral, Daily, Hemalatha Méndez MD    pantoprazole (PROTONIX) tablet 40 mg, 40 mg, Oral, [facility], Hemalatha Méndez MD, 40 mg at 06/24/20 0905    potassium chloride (KLOR-CON M) tenderness    Extremities: without C,C,E.  Pulses 2+ bilaterally   Mental Status: Alert & Oriented        PLANNED PROCEDURE   [x]Cath  [x]PCI                []Pacemaker/AICD  []THANH             []Cardioversion []Peripheral angiography/PTA  []Other:      SEDATION  Planned agent:[x]Midazolam []Meperidine []Sublimaze []Morphine  []Diazepam  []Other:     ASA Classification:  []1 []2 [x]3 []4 []5   Class 1: A normal healthy patient  Class 2: Pt with mild to moderate systemic disease  Class 3: Severe systemic disease or disturbance  Class 4: Severe systemic disorders that are already life threatening. Class 5: Moribund pt with little chances of survival, for more than 24 hours. Mallampati I Airway Classification:   []1 [x]2 []3 []4     [x]Pre-procedure diagnostic studies complete and results available. Comment:    [x]Previous sedation/anesthesia experiences assessed. Comment:    [x]The patient is an appropriate candidate to undergo the planned procedure sedation and anesthesia. (Refer to nursing sedation/analgesia documentation record)  [x]Formulation and discussion of sedation/procedure plan, risks, and expectations with patient and/or responsible adult completed. [x]Patient examined immediately prior to the procedure.  (Refer to nursing sedation/analgesia documentation record)      Jovanna Haque MD, Jackie Reardon    Electronically signed 6/24/2020 at 10:33 AM

## 2020-06-24 NOTE — BRIEF OP NOTE
[address] [place] [address]  Sedation/Analgesia Post Sedation Record    Pt Name: Tommy Doe  Account number: [de-identified]  MRN: [id number]  YOB: 1955  Procedure Performed By: Lizy Rice MD   Primary Care Physician: Enriqueta Allen  Date: 6/24/2020    POST-PROCEDURE    Physicians/Assistants: Lizy Rice MD     Procedure Performed:Cath      Sedation/Anesthesia: Versed/ Fentanyl and 2% xylocaine local anesthesia. Estimated Blood Loss: < 50 ml. Specimens Removed: None         Disposition of Specimen: N/A        Complications: No Immediate Complications. Post-procedure Diagnosis/Findings:        Nonischemic CMP   Mild nonobstructive CAD   Normal filling pressures      Recommendations:   OMT for CHF and aggressive risk factor modification for CAD   lifevest before discharge      Above findings and plan of care were discussed with patient, questions were answered, agreeable with plan.        Lizy Rice MD, Susanne Saldivar   Electronically signed 6/24/2020 at 11:36 AM  Interventional Cardiology

## 2020-06-24 NOTE — CARE COORDINATION
6/24/20, 11:33 AM EDT    DISCHARGE ON GOING [address] day: 5  Location: -28/028-A Reason for admit: CHF NYHA class II, acute, systolic ([address] [T07.76]   Procedure: 6/16 (Done at [facility]) Echo - EF 20-25%. 6/16 (done at [facility]) Stress test - EF 26%. Abnormal perfusion study, moderate perfusion defect of moderate intensity in the anteroseptal, lateral, and apical region(s) during stress imaging which is most consistent with but may be due to artifact. 6/23 Echo - EF 30-35%. Small mobile structure on the aortic valve likely lambl's excrescence. Other differentials could be vegetation vs thrombus. 6/24 Planned Cardiac Cath. Treatment Plan of Care: Hospitalist, Cardiology and Pulmonology following. O2 remains at 6L, reading 93%. Planning Cardiac cath today. Pt is down total of 35# since admission. IVF. Proventil aerosols q4hr [employer]. Coreg. Lovenox. Bevespi. Protonix. Barriers to Discharge: Cardiac cath, needs Life Vest. Not medically ready. PCP: Erich Gunter  Readmission Risk Score: 17%  Patient Goals/Plan/Treatment Preferences: Pt lives at home with her  and son. Anticipate need for home O2, plan home O2 eval prior to discharge. Call placed to Dr. Fadia Medley office in [facility], they follow all their own pt's in the office for HF. Appt made and placed on AVS. Anticipate need for Life Vest prior to discharge. Update: 2:09 PM    Orders received for Life Vest. Info faxed to Leatha Christy made aware.    Electronically signed by Betzaida Vila RN on 6/24/2020 at 2:09 PM

## 2020-06-24 NOTE — PROGRESS NOTES
severe diastolic dysfunction. She had stress test done which showed: a moderate perfusion defect of moderate intensity in the anteroseptal, lateral, and apical region(s) during stress imaging which is most consistent with but may be  due to artifact. EF was 26%). She lost 27 pounds in her admission. CT scan was with ? PE: she was placed on therapeutic Lovenox. She has been on high flow O2. She is having cough: No    Subjective/Events Past 24 hours/ROS   Currently stable on 6LPM- 93%  RHC and LHC today- did well  No events overnight  Improving SOB    -Rest of the body systems were reviewed.      PMHx   Past Medical History      Diagnosis Date    A-fib [facility])     Acute respiratory failure with hypoxia ([address] from CHF 6/18/2020    CHF (congestive heart failure) (HCC)     COPD [facility]ch[facility] obstructive pulmonary disease[facility]       Past Surgical History        Procedure Laterality Date    CHOLECYSTECTOMY      HERNIA REPAIR      x2    TONSILLECTOMY       Meds    Current Medications    predniSONE  40 mg Oral Daily    Followed by   Carlee Mcbride ON 6/26/2020] predniSONE  30 mg Oral Daily    Followed by   Carlee Mcbride ON 6/29/2020] predniSONE  20 mg Oral Daily    Followed by   Carlee Mcbride ON 7/2/2020] predniSONE  10 mg Oral Daily    glycopyrrolate-formoterol  2 puff Inhalation BID    albuterol  2.5 mg Nebulization Q4H WA    sodium chloride flush  10 mL Intravenous 2 times per day    aspirin  325 mg Oral Once    diphenhydrAMINE  50 mg Intravenous Once    hydrocortisone sodium succinate PF  200 mg Intravenous Once    magnesium replacement protocol   Other RX Placeholder    carvedilol  3.125 mg Oral BID WC    enoxaparin  1 mg/kg Subcutaneous Q12H    [Held by provider] lisinopril  2.5 mg Oral Daily    pantoprazole  40 mg Oral QAM AC     sodium chloride flush, nitroGLYCERIN, ondansetron, acetaminophen, potassium chloride **OR** potassium alternative oral replacement **OR** potassium chloride  IV Drips/Infusions   sodium chloride       Home Medications  Medications Prior to Admission: lisinopril (PRINIVIL;ZESTRIL) 2.5 MG tablet, Take 1 tablet by mouth daily  carvedilol (COREG) 3.125 MG tablet, Take 1 tablet by mouth 2 times daily (with meals)  furosemide (LASIX) 40 MG tablet, Take 1 tablet by mouth daily  Pediatric Multivitamins-Iron (FLINTSTONES COMPLETE PO), Take 1 tablet by mouth 2 times daily  Vitamins A & D (VITAMIN A & D PO), Take 1 capsule by mouth 3 times daily  calcium citrate-vitamin d (CALCIUM CITRATE + D) 250-200 MG-UNIT TABS, Take by mouth 2 times daily  Multiple Vitamins-Minerals (MULTIVITAMIN ADULT) CHEW, Take by mouth daily  Diet    Diet NPO Time Specified  Allergies    Patient has no known allergies. Family History    History reviewed. No pertinent family history.   Sleep History    Never diagnosed with sleep apnea in the past    Social History     Social History     Socioeconomic History    Marital status:      Spouse name: Not on file    Number of children: 3    Years of education: Not on file    Highest education level: Not on file   Occupational History    Not on file   Social Needs    Financial resource strain: Not on file    Food insecurity     Worry: Not on file     Inability: Not on file   Gen[employer] needs     Medical: Not on file     Non-medical: Not on file   Tobacco Use    Smoking status: Current Every Day Smoker     Packs/day: 0.25     Years: 20.00     Pack years: 5.00     Types: Cigarettes    Smokeless tobacco: Current User   Substance and Sexual Activity    Alcohol use: Not Currently    Drug use: Never    Sexual activity: Not on file   Lifestyle    Physical activity     Days per week: Not on file     Minutes per session: Not on file    Stress: Not on file   Relationships    Social connections     Talks on phone: Not on file     Gets together: Not on file     Attends Jew service: Not on file     Active member of club or organization: Not on file     Attends meetings of systolic  pressure of 48 mmHg. Mild to moderate tricuspid regurgitation. Pulmonic Valve  CONCLUSIONS     Summary  Global left ventricular systolic function appears severely reduced with an  estimated ejection fraction of 20-25%. Mildly increased left ventricular wall thickness with a normal left  ventricular cavity size. Severe biatrial enlargement. Right ventricular dilatation. Normal mitral valve structure with mild to moderate mitral regurgitation. Moderate pulmonary hypertension with an estimated right ventricular systolic  pressure of 48 mmHg. Mild to moderate tricuspid regurgitation. Diastology cannot be properly assessed due to the patients rhythm. No previous studies were available for comparison. If the patients reduced ejection fraction is a new finding, consider  additional ischemic cardiac testing if clinically indicated. Signature  ----------------------------------------------------------------------------   Electronically signed by Josephine Nicolas(Sonographer) on 06/17/2020 01:49 PM  ----------------------------------------------------------------------------     ----------------------------------------------------------------------------   Electronically signed by Tej Knutson(Interpreting physician) on   06/17/2020 04:49 PM  ----------------------------------------------------------------------------      Radiology    CXR  6/23/2020  XR CHEST (2 VW)   1. Mild cardiomegaly. A metallic wire fragment projects over the left main pulmonary artery, once the significance. Multiple vascular clips are present in the upper abdomen from prior surgery. 2. Moderate bibasilar atelectasis/pneumonia. Small bilateral pleural effusions. Overall appearance of chest improved from prior. 6/21/2020   XR CHEST (2 VW)   1. Cardiomegaly with pulmonary vascular congestion which has slightly improved since prior exam.   2. Small bilateral pleural effusions.          Jun 20, 2020   PROCEDURE: XR CHEST

## 2020-06-24 NOTE — PROCEDURES
[address] [place] [address]                            CARDIAC CATHETERIZATION    PATIENT NAME: Faye Leslie                     :        1955  MED REC NO:   [id number]                           ROOM:       0028  ACCOUNT NO:   [de-identified]                           ADMIT DATE: 2020  PROVIDER:     Ana Arana MD    DATE OF PROCEDURE:  2020    PHYSICIAN:  Ana Arana MD    INDICATION:  1. Acute congestive heart failure with reduced ejection fraction. 2.  New-onset cardiomyopathy, severe. 3.  Ejection fraction 30%. PROCEDURES PERFORMED:  1. Right heart catheterization. 2.  Left cardiac catheterization, selective coronary angiogram.  3.  Left ventriculography. DESCRIPTION OF THE PROCEDURE:  After informed consent, the patient was  brought to the cardiac catheterization room. She was prepped and draped  in a sterile fashion. 2% lidocaine was injected in the right arm area. Under ultrasound guidance, I was able to obtain access in the right  brachial vein. Using modified Seldinger technique, 5-Icelandic sheath was  inserted and then flushed. I used 5-Icelandic Aguirre catheter to complete  the right heart catheterization. I then turned my attention to left  cardiac catheterization. Lidocaine was injected in the skin,  subcutaneous tissue overlying the right radial artery. Under ultrasound  guidance and using modified Seldinger technique, I was able to obtain  access in the right radial artery. 5/6 Slender sheath was inserted. Standard antithrombotic/antispasmodic medications were given. I used  5-Icelandic JL-3.5, 5-Icelandic JR-4 diagnostic catheters to complete the left  cardiac catheterization. FINDINGS:  Left ventriculography. There is moderate-to-severe  cardiomyopathy, ejection fraction 30%. Global hypokinesis.     HEMODYNAMICS:  Pulmonary artery pressure 37/13 with a mean pulmonary  artery pressure of 21.  Pulmonary capillary wedge pressure of 3. Right  ventricular pressure 38/0, RA pressure 2. Left ventricular  end-diastolic pressure 4. Cardiac output by Narendra 3.81 with a cardiac  index of 2.3. Pulmonary artery oxygen saturation 68%. CORONARY ANGIOGRAM:  1. Right coronary artery:  Proximal RCA patent. Mid RCA has mild  luminal irregularities. RCA is dominant vessel. Distal RCA is patent. RPL and RPDA with mild diffuse disease. 2.  Left main coronary artery patent:  No significant stenosis. 3.  Left circumflex artery:  Proximal LCX has 20% lesion. Distal LCX is  patent with no significant stenotic lesions. 4. LAD:  Proximal LAD is patent. No significant stenotic lesions. Mid  LAD is patent. Distal LAD with mild diffuse disease. D1 is a large  vessel that is patent with no significant stenotic lesions. MEDICATIONS:  See MAR. COMPLICATIONS:  None. ESTIMATED BLOOD LOSS:  Less than 30 mL. ACCESS:  Right radial artery access for left heart cath. Right brachial  vein access for right heart cath. IMPRESSION:  1. Severe nonischemic cardiomyopathy. 2.  Nonobstructive coronary artery disease. RECOMMENDATIONS:  1. Optimal medical therapy for coronary artery disease and congestive  heart failure. 2.  LifeVest before discharge.         Rancho Shepherd MD    D: 06/24/2020 11:43:31       T: 06/24/2020 11:50:37     [id number]  Job#: [id number]     Doc#: [id number]    CC:

## 2020-06-24 NOTE — PROGRESS NOTES
Cardiology Progress Note      Patient:  Gloria Colon  YOB: 1955  MRN: [id number]   Acct: [de-identified]  Admit Date:  6/19/2020  Primary Cardiologist: Mirella anna  Seen by Dr. Jono Hyde     Per prior cardiology consult note-  REASON FOR CONSULTATION:  CHF exacerbation and newly diagnosed  cardiomyopathy.     HISTORY OF PRESENT ILLNESS:  This is a 42-year-old  female  patient who has been transferred from [place] for further evaluation and  management of congestive heart failure, newly diagnosed actually;  pulmonary edema; and newly diagnosed cardiomyopathy. The patient was  admitted at [facility] on 06/17/2020 for CHF exacerbation and pulmonary  edema, and she has been diuresed there, but she was not getting better,  and so the patient was transferred to our medical setup. She has an  echocardiogram done there, the ejection fraction was low with ejection  fraction of around 20% to 25% with severe global hypokinesis with mild  elevation of right ventricular systolic pressure of 48 mmHg while the  patient is on congestive heart failure, so transferred here for further  evaluation and care. The patient stated that she has been having leg  swelling and shortness of breath over the last three months since  03/2020 and started progressively getting worse. She did not seek  medical advise because of the COVID-19, and she was having orthopnea of  marked degree she was almost sitting when in the night, and she has  significant paroxysmal nocturnal dyspnea and marked leg swelling. In  view of that, she went to the primary care physician and was sent to  [place]. She has been treated in [place] for a couple of days with no  significant improvement, hence transferred to our medical setup.     She lost significant amount of fluid still since her admission.    Overnight, she has been started on Lasix drip 5 mg per hour, and with  that, the patient has diuresed recently, and the patient stated that  today she is ----------------------------------------------------------------      Findings      Mitral Valve   The mitral valve structure is normal with normal leaflet separation. DOPPLER: The transmitral velocity was within the normal range with no   evidence for mitral stenosis. There was no evidence of mitral   regurgitation. Aortic Valve   The aortic valve appears trileaflet with normal thickness and leaflet   excursion. Small mobile structure on the aortic valve likely lambl's excrescence. DOPPLER: Transaortic velocity was within the normal range with no evidence   of aortic stenosis. There was no evidence of aortic regurgitation. Consider THANH to further evaluate. Tricuspid Valve   The tricuspid valve structure is normal with normal leaflet separation. DOPPLER: There is no evidence of tricuspid stenosis. There was no evidence   of tricuspid regurgitation. Pulmonic Valve   The pulmonic valve leaflets appear normal thickness, and normal cuspal   separation. DOPPLER: The transpulmonic velocity was within the normal   range. No evidence for regurgitation. Left Atrium   Left atrial size is normal.      Left Ventricle   Left ventricular size is normal and systolic function is moderately   reduced. Ejection fraction was estimated at 30-35%. LV wall thickness is   within normal limits. Right Atrium   Right atrial size was normal.      Right Ventricle   The right ventricular size appears normal with normal systolic function   and wall thickness. Pericardial Effusion   The pericardium appears normal with no evidence of a pericardial effusion. Pleural Effusion   No evidence of pleural effusion. Aorta / Great Vessels   -Aortic root dimension within normal limits. -IVC size is within normal limits with normal respiratory phasic changes.        Electronically signed by Tej Knutson(Interpreting physician) on   06/17/2020 04:49

## 2020-06-24 NOTE — PLAN OF CARE
Problem: Falls - Risk of:  Goal: Will remain free from falls  Description: Will remain free from falls  6/23/2020 1838 by Calista Downs RN  Outcome: Ongoing  Note: Patient remained free from falls this shift. Bed is in low position with alarm on and siderails up x2. Patient ambulates with one assist. Education given on use of call light and patient voiced understanding. Patient uses call light appropriately. Call light and beside table within reach. Arm band and falling star in place. 6/23/2020 1737 by Calista Downs RN  Note: Patient remained free from falls this shift. Bed is in low position with alarm on and siderails up x2. Patient ambulates with one assist. Education given on use of call light and patient voiced understanding. Patient uses call light appropriately. Call light and beside table within reach. Arm band and falling star in place. Goal: Absence of physical injury  Description: Absence of physical injury  Outcome: Ongoing  Note: Patient remained free from falls this shift. Bed is in low position with alarm on and siderails up x2. Patient ambulates with one assist. Education given on use of call light and patient voiced understanding. Patient uses call light appropriately. Call light and beside table within reach. Arm band and falling star in place. Problem: Discharge Planning:  Goal: Participates in care planning  Description: Participates in care planning  Outcome: Ongoing  Note: Patient attentive and active in patient education  Goal: Discharged to appropriate level of care  Description: Discharged to appropriate level of care  Outcome: Ongoing     Problem: Anxiety/Stress:  Goal: Level of anxiety will decrease  Description: Level of anxiety will decrease  Outcome: Ongoing  Note: Patient calm and cooperative.  Has not been agitated or shown signs of anxiety this shift     Problem: Cardiac Output - Decreased:  Goal: Hemodynamic stability will improve  Description: Hemodynamic stability
Problem: Impaired respiratory status  Goal: Clear lung sounds  6/20/2020 0835 by Cecilio Tesfaye RCP  Outcome: Ongoing  Note: Pt continues on aerosols to clear lung sounds/improve aeration.
Problem: Impaired respiratory status  Goal: Clear lung sounds  6/21/2020 0959 by Saurav Kennedy RCP  Outcome: Ongoing  Continue duoneb to help improve lung aeration
Problem: Impaired respiratory status  Goal: Clear lung sounds  6/22/2020 2008 by Michael Landa RCP  Outcome: Met This Shift  Note:  Patient lung sounds are considered normal for their current lung condition. No signs of distress noted.  Current treatment regimen appropriate
Pat Trevizo RN  Outcome: Ongoing  Note: Patient continues on high flow nasal canula. Continue to try and wean. Problem: Impaired respiratory status  Goal: Clear lung sounds  6/21/2020 1716 by Pat Trevizo RN  Outcome: Ongoing  Note: Lungs diminished throughout. Continue to assess. Care plan reviewed with patient  Patient  verbalize understanding of the plan of care and contribute to goal setting.
drained total of 1750 ml urine the past 12 hours. Lungs are clear and patient feels much better. Problem: Fluid Volume - Imbalance:  Goal: Absence of imbalanced fluid volume signs and symptoms  Description: Absence of imbalanced fluid volume signs and symptoms  6/21/2020 0606 by Abbey Worrell RN  Outcome: Ongoing  Note: IV Lasix infusion continues and indwelling mulligan as well. Problem: Gas Exchange - Impaired:  Goal: Levels of oxygenation will improve  Description: Levels of oxygenation will improve  6/21/2020 0606 by Abbey Worrell RN  Outcome: Ongoing  Note: O2 sats 95% and Hi Ryan continues to be weaned and currently=40L and 55%     Problem: Impaired respiratory status  Goal: Clear lung sounds  6/21/2020 0606 by Abbey Worrell RN  Outcome: Ongoing  Note: Lung sounds are clear just diminished.
patient about precautions. Care plan reviewed with patient. Patient verbalizes understanding of the care plan and contributed to goal setting.
urine the past 12 hours. Lungs are clear and patient feels much better. Problem: Fluid Volume - Imbalance:  Goal: Absence of imbalanced fluid volume signs and symptoms  Description: Absence of imbalanced fluid volume signs and symptoms  Outcome: Ongoing  Note: IVP Lasix  continues and indwelling mulligan as well. Problem: Gas Exchange - Impaired:  Goal: Levels of oxygenation will improve  Description: Levels of oxygenation will improve  Outcome: Ongoing  Note: O2 sats 95% with O2 at 6lpm per nasal cannula. Problem: Impaired respiratory status  Goal: Clear lung sounds  Outcome: Ongoing  Note: Lung sounds are very diminished bilateral lower lobes. We did discuss stopping smoking and patient was adamant about being done.

## 2020-06-24 NOTE — PROGRESS NOTES
goal 2: Carringtonn will ambulate 150' with no AD and supervision to [facility] home. Long term goals  Time Frame for Long term goals : N/A due to short estimated length of stay    Following session, patient left in safe position with all fall risk precautions in place.

## 2020-06-25 VITALS
WEIGHT: 133.6 LBS | SYSTOLIC BLOOD PRESSURE: 107 MMHG | DIASTOLIC BLOOD PRESSURE: 57 MMHG | HEIGHT: 65 IN | RESPIRATION RATE: 20 BRPM | HEART RATE: 79 BPM | TEMPERATURE: 97.4 F | OXYGEN SATURATION: 90 % | BODY MASS INDEX: 22.26 KG/M2

## 2020-06-25 LAB
ANION GAP SERPL CALCULATED.3IONS-SCNC: 9 MEQ/L (ref 8–16)
BUN BLDV-MCNC: 19 MG/DL (ref 7–22)
CALCIUM SERPL-MCNC: 8.1 MG/DL (ref 8.5–10.5)
CHLORIDE BLD-SCNC: 96 MEQ/L (ref 98–111)
CO2: 34 MEQ/L (ref 23–33)
CREAT SERPL-MCNC: 0.3 MG/DL (ref 0.4–1.2)
ERYTHROCYTE [DISTWIDTH] IN BLOOD BY AUTOMATED COUNT: 16.4 % (ref 11.5–14.5)
ERYTHROCYTE [DISTWIDTH] IN BLOOD BY AUTOMATED COUNT: 53.1 FL (ref 35–45)
GFR SERPL CREATININE-BSD FRML MDRD: > 90 ML/MIN/1.73M2
GLUCOSE BLD-MCNC: 93 MG/DL (ref 70–108)
HCT VFR BLD CALC: 44 % (ref 37–47)
HEMOGLOBIN: 13.3 GM/DL (ref 12–16)
MCH RBC QN AUTO: 26.9 PG (ref 26–33)
MCHC RBC AUTO-ENTMCNC: 30.2 GM/DL (ref 32.2–35.5)
MCV RBC AUTO: 88.9 FL (ref 81–99)
PLATELET # BLD: 200 THOU/MM3 (ref 130–400)
PMV BLD AUTO: 12.4 FL (ref 9.4–12.4)
POTASSIUM SERPL-SCNC: 3.9 MEQ/L (ref 3.5–5.2)
RBC # BLD: 4.95 MILL/MM3 (ref 4.2–5.4)
SODIUM BLD-SCNC: 139 MEQ/L (ref 135–145)
WBC # BLD: 7.1 THOU/MM3 (ref 4.8–10.8)

## 2020-06-25 PROCEDURE — 6360000002 HC RX W HCPCS: Performed by: NURSE PRACTITIONER

## 2020-06-25 PROCEDURE — 93270 REMOTE 30 DAY ECG REV/REPORT: CPT

## 2020-06-25 PROCEDURE — 6360000002 HC RX W HCPCS: Performed by: OPHTHALMOLOGY

## 2020-06-25 PROCEDURE — 36415 COLL VENOUS BLD VENIPUNCTURE: CPT

## 2020-06-25 PROCEDURE — 80048 BASIC METABOLIC PNL TOTAL CA: CPT

## 2020-06-25 PROCEDURE — 85027 COMPLETE CBC AUTOMATED: CPT

## 2020-06-25 PROCEDURE — 6370000000 HC RX 637 (ALT 250 FOR IP): Performed by: NURSE PRACTITIONER

## 2020-06-25 PROCEDURE — 99232 SBSQ HOSP IP/OBS MODERATE 35: CPT | Performed by: INTERNAL MEDICINE

## 2020-06-25 PROCEDURE — 2580000003 HC RX 258: Performed by: INTERNAL MEDICINE

## 2020-06-25 PROCEDURE — 6370000000 HC RX 637 (ALT 250 FOR IP): Performed by: FAMILY MEDICINE

## 2020-06-25 PROCEDURE — APPSS30 APP SPLIT SHARED TIME 16-30 MINUTES: Performed by: NURSE PRACTITIONER

## 2020-06-25 PROCEDURE — 99239 HOSP IP/OBS DSCHRG MGMT >30: CPT | Performed by: FAMILY MEDICINE

## 2020-06-25 PROCEDURE — 94640 AIRWAY INHALATION TREATMENT: CPT

## 2020-06-25 PROCEDURE — 6370000000 HC RX 637 (ALT 250 FOR IP): Performed by: PHYSICIAN ASSISTANT

## 2020-06-25 PROCEDURE — 99232 SBSQ HOSP IP/OBS MODERATE 35: CPT | Performed by: PHYSICIAN ASSISTANT

## 2020-06-25 PROCEDURE — 6370000000 HC RX 637 (ALT 250 FOR IP): Performed by: OPHTHALMOLOGY

## 2020-06-25 PROCEDURE — 97116 GAIT TRAINING THERAPY: CPT

## 2020-06-25 RX ORDER — ASPIRIN 81 MG/1
81 TABLET, CHEWABLE ORAL DAILY
Status: DISCONTINUED | OUTPATIENT
Start: 2020-06-25 | End: 2020-06-25 | Stop reason: HOSPADM

## 2020-06-25 RX ORDER — POTASSIUM CHLORIDE 20 MEQ/1
20 TABLET, EXTENDED RELEASE ORAL DAILY
Qty: 30 TABLET | Refills: 0 | Status: SHIPPED | OUTPATIENT
Start: 2020-06-25 | End: 2020-07-20

## 2020-06-25 RX ORDER — ALBUTEROL SULFATE 2.5 MG/3ML
2.5 SOLUTION RESPIRATORY (INHALATION) EVERY 4 HOURS PRN
Qty: 120 EACH | Refills: 3 | Status: SHIPPED | OUTPATIENT
Start: 2020-06-25

## 2020-06-25 RX ORDER — NITROGLYCERIN 0.4 MG/1
TABLET SUBLINGUAL
Qty: 25 TABLET | Refills: 0 | Status: SHIPPED | OUTPATIENT
Start: 2020-06-25

## 2020-06-25 RX ORDER — PREDNISONE 10 MG/1
TABLET ORAL
Qty: 18 TABLET | Refills: 0 | Status: SHIPPED | OUTPATIENT
Start: 2020-06-25 | End: 2020-07-16 | Stop reason: ALTCHOICE

## 2020-06-25 RX ORDER — ATORVASTATIN CALCIUM 10 MG/1
10 TABLET, FILM COATED ORAL NIGHTLY
Qty: 30 TABLET | Refills: 0 | Status: SHIPPED | OUTPATIENT
Start: 2020-06-25 | End: 2020-07-20

## 2020-06-25 RX ORDER — PANTOPRAZOLE SODIUM 40 MG/1
40 TABLET, DELAYED RELEASE ORAL
Qty: 30 TABLET | Refills: 2 | Status: SHIPPED | OUTPATIENT
Start: 2020-06-26 | End: 2020-07-20

## 2020-06-25 RX ORDER — DIGOXIN 250 MCG
250 TABLET ORAL DAILY
Status: DISCONTINUED | OUTPATIENT
Start: 2020-06-25 | End: 2020-06-25 | Stop reason: HOSPADM

## 2020-06-25 RX ORDER — BUMETANIDE 1 MG/1
1 TABLET ORAL DAILY
Qty: 30 TABLET | Refills: 0 | Status: SHIPPED | OUTPATIENT
Start: 2020-06-26 | End: 2020-07-20

## 2020-06-25 RX ORDER — BUMETANIDE 1 MG/1
1 TABLET ORAL DAILY
Status: DISCONTINUED | OUTPATIENT
Start: 2020-06-25 | End: 2020-06-25 | Stop reason: HOSPADM

## 2020-06-25 RX ORDER — ATORVASTATIN CALCIUM 10 MG/1
10 TABLET, FILM COATED ORAL NIGHTLY
Status: DISCONTINUED | OUTPATIENT
Start: 2020-06-25 | End: 2020-06-25 | Stop reason: HOSPADM

## 2020-06-25 RX ORDER — DIGOXIN 250 MCG
250 TABLET ORAL DAILY
Qty: 30 TABLET | Refills: 0 | Status: SHIPPED | OUTPATIENT
Start: 2020-06-25 | End: 2020-07-20

## 2020-06-25 RX ORDER — ASPIRIN 81 MG/1
81 TABLET, CHEWABLE ORAL DAILY
Qty: 30 TABLET | Refills: 0 | Status: SHIPPED | OUTPATIENT
Start: 2020-06-25

## 2020-06-25 RX ADMIN — GLYCOPYRROLATE AND FORMOTEROL FUMARATE 2 PUFF: 9; 4.8 AEROSOL, METERED RESPIRATORY (INHALATION) at 07:29

## 2020-06-25 RX ADMIN — BUMETANIDE 1 MG: 1 TABLET ORAL at 09:37

## 2020-06-25 RX ADMIN — ACETAMINOPHEN 650 MG: 325 TABLET ORAL at 07:51

## 2020-06-25 RX ADMIN — Medication 10 ML: at 09:41

## 2020-06-25 RX ADMIN — METOPROLOL TARTRATE 12.5 MG: 25 TABLET ORAL at 09:37

## 2020-06-25 RX ADMIN — PREDNISONE 40 MG: 20 TABLET ORAL at 09:37

## 2020-06-25 RX ADMIN — ALBUTEROL SULFATE 2.5 MG: 2.5 SOLUTION RESPIRATORY (INHALATION) at 07:28

## 2020-06-25 RX ADMIN — ENOXAPARIN SODIUM 80 MG: 80 INJECTION SUBCUTANEOUS at 07:23

## 2020-06-25 RX ADMIN — DIGOXIN 250 MCG: 250 TABLET ORAL at 14:23

## 2020-06-25 RX ADMIN — PANTOPRAZOLE SODIUM 40 MG: 40 TABLET, DELAYED RELEASE ORAL at 07:24

## 2020-06-25 RX ADMIN — ALBUTEROL SULFATE 2.5 MG: 2.5 SOLUTION RESPIRATORY (INHALATION) at 13:23

## 2020-06-25 RX ADMIN — ASPIRIN 81 MG 81 MG: 81 TABLET ORAL at 14:23

## 2020-06-25 ASSESSMENT — PAIN DESCRIPTION - LOCATION: LOCATION: HEAD

## 2020-06-25 ASSESSMENT — PAIN DESCRIPTION - ORIENTATION: ORIENTATION: MID

## 2020-06-25 ASSESSMENT — PAIN DESCRIPTION - FREQUENCY: FREQUENCY: CONTINUOUS

## 2020-06-25 ASSESSMENT — PAIN DESCRIPTION - DESCRIPTORS: DESCRIPTORS: HEADACHE

## 2020-06-25 ASSESSMENT — PAIN SCALES - GENERAL
PAINLEVEL_OUTOF10: 3
PAINLEVEL_OUTOF10: 1
PAINLEVEL_OUTOF10: 3

## 2020-06-25 ASSESSMENT — PAIN DESCRIPTION - ONSET: ONSET: ON-GOING

## 2020-06-25 ASSESSMENT — PAIN DESCRIPTION - PROGRESSION: CLINICAL_PROGRESSION: NOT CHANGED

## 2020-06-25 ASSESSMENT — PAIN - FUNCTIONAL ASSESSMENT: PAIN_FUNCTIONAL_ASSESSMENT: ACTIVITIES ARE NOT PREVENTED

## 2020-06-25 ASSESSMENT — PAIN DESCRIPTION - PAIN TYPE: TYPE: ACUTE PAIN

## 2020-06-25 NOTE — PROGRESS NOTES
Tested    Transfers:  Sit to Stand: Supervision  Stand to Sit:Supervision    Ambulation:  Stand By Assistance, Contact Guard Assistance, X 1  Distance: ~150ft x1  Surface: Level Tile  Device:No Device  Gait Deviations:  Slow Kelly, Decreased Step Length Bilaterally, Decreased Arm Swing, Decreased Trunk Rotation, Decreased Gait Speed, Decreased Heel Strike Bilaterally, Mild Path Deviations and slight unsteady, no major LOB    Exercise:  None performed this session. Provided handouts and education on HEP for home. Functional Outcome Measures: Completed  -PAC Inpatient Mobility without Stair Climbing Raw Score : 17  AM-PAC Inpatient without Stair Climbing T-Scale Score : 48.47    ASSESSMENT:  Assessment: Patient progressing toward established goals. Activity Tolerance:  Patient tolerance of  treatment: good. Equipment Recommendations:Equipment Needed: (continue to assess)  Discharge Recommendations:  Continue to assess pending progress, Patient would benefit from continued therapy after discharge    Plan: Times per week: 4-5x GM  Times per day: Daily  Current Treatment Recommendations: Strengthening, Balance Training, Transfer Training, Endurance Training, Gait Training, Neuromuscular Re-education, Home Exercise Program, Patient/Caregiver Education & Training, Functional Mobility Training, Stair training, Safety Education & Training    Patient Education  Patient Education: Plan of Care, Home Exercise Program, Transfers, Gait    Goals:  Patient goals : go home  Short term goals  Time Frame for Short term goals: at discharge  Short term goal 1: Patient will complete sit < > stand with modified independence to stand to ambulate safely. Short term goal 2: Carringtonn will ambulate 80' with no AD and supervision to [facility].   Long term goals  Time Frame for Long term goals : N/A due to short estimated length of stay    Following session, patient left in safe position with all fall risk precautions in

## 2020-06-25 NOTE — PROCEDURES
The skin was prepped and a cardiac event monitor was applied. The patient was instructed on the documentation of symptoms and the purpose of the monitor as well as the things to avoid while wearing the monitor. The patient was instructed to remove and return the monitor on 07/09/2020.   The serial number of the monitor that was applied is [id number]

## 2020-06-25 NOTE — PROGRESS NOTES
was transferred from [facility]. She was admitted on 6/17/2020 for CHF exacerbation and pulmonary edema. She reported that she is having worsening SOB and LE edema for the 3 months. She could not lay down in her bed she was sleeping in chair prior to her admission. She was sating 70% on RA in ER  She was followed by cardiology during her admission (Dr. Tayo Zavala). Echo showed EF of 30-35 %with severe global hypokinesis. Biatrial enlargement noted and moderate to severe diastolic dysfunction. She had stress test done which showed: a moderate perfusion defect of moderate intensity in the anteroseptal, lateral, and apical region(s) during stress imaging which is most consistent with but may be  due to artifact. EF was 26%). She lost 27 pounds in her admission. CT scan was with ? PE: she was placed on therapeutic Lovenox. She has been on high flow O2. She is having cough: No    Subjective/Events Past 24 hours/ROS   Currently stable on 2LPM- 90%  No events overnight  Improving SOB  Life vest on     -Rest of the body systems were reviewed.      PMHx   Past Medical History      Diagnosis Date    A-fib [facility])     Acute respiratory failure with hypoxia ([address] from CHF 6/18/2020    CHF (congestive heart failure) (HCC)     COPD (chronic obstructive pulmonary disease) (HCC)       Past Surgical History        Procedure Laterality Date    CHOLECYSTECTOMY      HERNIA REPAIR      x2    TONSILLECTOMY       Meds    Current Medications    bumetanide  1 mg Oral Daily    sodium chloride flush  10 mL Intravenous 2 times per day    predniSONE  40 mg Oral Daily    Followed by   Anglea Garcia ON 6/26/2020] predniSONE  30 mg Oral Daily    Followed by   Angela Garcia ON 6/29/2020] predniSONE  20 mg Oral Daily    Followed by   Angela Garcia ON 7/2/2020] predniSONE  10 mg Oral Daily    glycopyrrolate-formoterol  2 puff Inhalation BID    albuterol  2.5 mg Nebulization Q4H WA    diphenhydrAMINE  50 mg Intravenous Once    hydrocortisone sodium edema  Pulmonary/Chest: Normal effort with bilateral air entry, clear breath sounds diminished bases bilaterally. No stridor. No respiratory distress. Patient exhibits no tenderness. Life vest on. Abdominal: Soft. Bowel sounds audible. No distension or tenderness to palp. Musculoskeletal: Moves all extremities; no clubbing or cyanosis  Neurological: Patient is alert and oriented to person, place, and time. Skin: Warm and dry. Labs  - Old records and notes have been reviewed in [facility]   ABG  No results found for: PH, PO2, PCO2, HCO3, O2SAT  Lab Results   Component Value Date    MODE NOT REPORTED 06/18/2020     CBC  Recent Labs     06/23/20 2300 06/25/20  0228   WBC 8.8 7.1   RBC 5.28 4.95   HGB 14.1 13.3   HCT 46.9 44.0   MCV 88.8 88.9   MCH 26.7 26.9   MCHC 30.1* 30.2*    200   MPV 12.9* 12.4      BMP  Recent Labs     06/23/20  0502 06/23/20 2300 06/25/20  0228    136 139   K 4.8 4.4 3.9   CL 91* 90* 96*   CO2 35* 37* 34*   BUN 20 24* 19   CREATININE 0.5 0.5 0.3*   GLUCOSE 119* 160* 93   MG 2.2 2.4  --    CALCIUM 8.4* 8.3* 8.1*     LFT  Recent Labs     06/23/20  0502   AST 17   ALT 14   BILITOT 0.5   ALKPHOS 80     TROP  Lab Results   Component Value Date    TROPONINT NOT REPORTED 06/16/2020    TROPONINT NOT REPORTED 06/16/2020     BNP  No results for input(s): BNP in the last 72 hours. Lactic Acid  No results for input(s): LACTA in the last 72 hours. INR  Recent Labs     06/23/20  2300   INR 1.04     PTT  Recent Labs     06/23/20 2300   APTT 33.6     Glucose  No results for input(s): POCGLU in the last 72 hours. UA   No results for input(s): SPECGRAV, PHUR, COLORU, CLARITYU, MUCUS, PROTEINU, BLOODU, RBCUA, WBCUA, BACTERIA, NITRU, GLUCOSEU, BILIRUBINUR, UROBILINOGEN, KETUA, LABCAST, LABCASTTY, AMORPHOS in the last 72 hours. Invalid input(s): CRYSTALS.     PFTs   None in Epic  Bed side spirometry:   Date performed:6/22/2020  FEV1: Measurement:                   38     % artery pressure 37/13 with a mean pulmonary  artery pressure of 21. Pulmonary capillary wedge pressure of 3. Right  ventricular pressure 38/0, RA pressure 2. Left ventricular  end-diastolic pressure 4. Cardiac output by Narendra 3.81 with a cardiac  index of 2.3. Pulmonary artery oxygen saturation 68%. Radiology    CXR  6/23/2020  XR CHEST (2 VW)   1. Mild cardiomegaly. A metallic wire fragment projects over the left main pulmonary artery, once the significance. Multiple vascular clips are present in the upper abdomen from prior surgery. 2. Moderate bibasilar atelectasis/pneumonia. Small bilateral pleural effusions. Overall appearance of chest improved from prior. 6/21/2020   XR CHEST (2 VW)   1. Cardiomegaly with pulmonary vascular congestion which has slightly improved since prior exam.   2. Small bilateral pleural effusions. Jun 20, 2020   PROCEDURE: XR CHEST PORTABLE   1. There is pulmonary vascular congestion with bibasilar atelectasis and/or infiltrates. In the right clinical setting these findings can be associated with congestive heart failure. There is no pleural effusion. Follow-up chest radiograph recommended to  confirm complete resolution. Jun 16, 2020   EXAMINATION: ONE XRAY VIEW OF THE CHEST   Findings of CHF/volume overload. CT Scans  (See actual reports for details)  Jun 16, 2020   EXAMINATION: CTA OF THE CHEST 6/16/2020 3:17 pm   Imaging findings of CHF decompensation. Subtle eccentric filling defect in right upper lobe branch pulmonary artery (series 601, image 98) is suspicious of small subsegmental pulmonary embolism. Assessment   -Acute hypoxic respiratory failure due to due to decompensated CHF Vs Bilateral pleural effusions Right > Left Vs small subsegmental pulmonary embolism in the right upper lobe. -Bilateral pleural effusions Right > Left- most likely due to CHF- Improved with diuresis. -Acute decompensated chronic systolic CHF.   -Pulmonary

## 2020-06-25 NOTE — PROGRESS NOTES
Discharge teaching and instructions for diagnosis/procedure of CHF completed with patient using teachback method. AVS reviewed. Prescriptions sent to [facility]. Patient voiced understanding regarding prescriptions, follow up appointments, and care of self at home. Discharged in a wheelchair to  home with support per family. Home oxygen delivered to bedside per [employer] at 18:00. [employer] verified that the nebulizer will be delivered to the home with the oxygen concentrator. Patient stable at discharge. CHF information given. Radial cath site care instruction given. Patient voiced understanding of PFT and outpatient labs/testing.

## 2020-06-25 NOTE — DISCHARGE SUMMARY
tablet by mouth daily             predniSONE (DELTASONE) 10 MG tablet  Take 3 tabs po daily x 3 days then 2 tabs po daily x 3 days then 1 tab po daily x 3 days                 Patient Instructions:    Discharge lab work: none  Activity: activity as tolerated  Diet: DIET CARDIAC; Daily Fluid Restriction: 1800 ml    Code Status: Full Code    Follow-up visits:   Gurpreet Skinner [facility] [address]  [facility] [address] Cassia Avila MD  [address] [place] [address] [phone]    On 7/8/2020  Appointment time is: 320pm, Please arrive 15 minutes early, Please bring photo ID, Insurance card and Med List, If unable to keep appt, please call and reschedule       Procedures:   -- limited echo 6/23/2020 = Summary   Left ventricular size is normal and systolic function is moderately   reduced. Ejection fraction was estimated at 30-35%. LV wall thickness is   within normal limits. The right ventricular size appears normal with normal systolic function   and wall thickness. The aortic valve appears trileaflet with normal thickness and leaflet   excursion. Small mobile structure on the aortic valve likely lambl's excrescence. Other differentials could be vegetation vs thrombus. There was no evidence of aortic regurgitation. Consider THANH to further evaluate. Signature      ----------------------------------------------------------------   Electronically signed by Janelle Alcantara MD (Interpreting   physician) on 06/23/2020 at 03:31 PM   ----------------------------------------------------------------    -- C/RHC 6/24/2020 = CORONARY ANGIOGRAM:  1. Right coronary artery:  Proximal RCA patent. Mid RCA has mild  luminal irregularities. RCA is dominant vessel. Distal RCA is patent. RPL and RPDA with mild diffuse disease. 2.  Left main coronary artery patent:  No significant stenosis. 3.  Left circumflex artery:  Proximal LCX has 20% lesion.   Distal LCX is  patent with no significant stenotic lesions. 4. LAD:  Proximal LAD is patent. No significant stenotic lesions. Mid  LAD is patent. Distal LAD with mild diffuse disease. D1 is a large  vessel that is patent with no significant stenotic lesions. MEDICATIONS:  See MAR. COMPLICATIONS:  None. ESTIMATED BLOOD LOSS:  Less than 30 mL. ACCESS:  Right radial artery access for left heart cath. Right brachial  vein access for right heart cath. IMPRESSION:  1. Severe nonischemic cardiomyopathy. 2.  Nonobstructive coronary artery disease. RECOMMENDATIONS:  1. Optimal medical therapy for coronary artery disease and congestive  heart failure. 2.  LifeVest before discharge.            Paula Hein MD        Consults:   IP CONSULT TO CASE MANAGEMENT  IP CONSULT TO CARDIOLOGY  IP CONSULT TO PULMONOLOGY  IP CONSULT TO HEART FAILURE NURSE/COORDINATOR      Examination:  Vitals:  Vitals:    06/25/20 0248 06/25/20 0502 06/25/20 0745 06/25/20 1126   BP: 126/62  107/69 (!) 109/58   Pulse: 75  81 80   Resp: 17  22 18   Temp:   97.6 °F (36.4 °C) 97.9 °F (36.6 °C)   TempSrc:   Oral Oral   SpO2: 95%  90% 91%   Weight:  133 lb 9.6 oz (60.6 kg)     Height:         Weight: Weight: 133 lb 9.6 oz (60.6 kg)     24 hour intake/output:    Intake/Output Summary (Last 24 hours) at 6/25/2020 1421  Last data filed at 6/25/2020 1353  Gross per 24 hour   Intake 660 ml   Output 200 ml   Net 460 ml       General appearance - alert, well appearing, and in no distress and oriented to person, place, and time  Chest - clear to auscultation, no wheezes, rales or rhonchi, symmetric air entry, no tachypnea, retractions or cyanosis  Heart - normal rate and regular rhythm, S1 and S2 normal, ectopy  Abdomen - soft, nontender, nondistended, no masses or organomegaly  bowel sounds normal  Obese: No; Protuberant: No   Neurological - alert, oriented, normal speech, no focal findings or movement disorder noted, cranial nerves II through XII Collection Time: 06/23/20  5:02 AM   Result Value Ref Range    Glucose 119 (H) 70 - 108 mg/dL    CREATININE 0.5 0.4 - 1.2 mg/dL    BUN 20 7 - 22 mg/dL    Sodium 138 135 - 145 meq/L    Potassium 4.8 3.5 - 5.2 meq/L    Chloride 91 (L) 98 - 111 meq/L    CO2 35 (H) 23 - 33 meq/L    Calcium 8.4 (L) 8.5 - 10.5 mg/dL    AST 17 5 - 40 U/L    Alkaline Phosphatase 80 38 - 126 U/L    Total Protein 6.1 6.1 - 8.0 g/dL    Alb 3.5 3.5 - 5.1 g/dL    Total Bilirubin 0.5 0.3 - 1.2 mg/dL    ALT 14 11 - 66 U/L   Magnesium    Collection Time: 06/23/20  5:02 AM   Result Value Ref Range    Magnesium 2.2 1.6 - 2.4 mg/dL   Anion Gap    Collection Time: 06/23/20  5:02 AM   Result Value Ref Range    Anion Gap 12.0 8.0 - 16.0 meq/L   Glomerular Filtration Rate, Estimated    Collection Time: 06/23/20  5:02 AM   Result Value Ref Range    Est, Glom Filt Rate >90 ml/min/1.73m2   APTT    Collection Time: 06/23/20 11:00 PM   Result Value Ref Range    aPTT 33.6 22.0 - 38.0 seconds   Basic Metabolic Panel w/ Reflex to MG    Collection Time: 06/23/20 11:00 PM   Result Value Ref Range    Sodium 136 135 - 145 meq/L    Potassium reflex Magnesium 4.4 3.5 - 5.2 meq/L    Chloride 90 (L) 98 - 111 meq/L    CO2 37 (H) 23 - 33 meq/L    Glucose 160 (H) 70 - 108 mg/dL    BUN 24 (H) 7 - 22 mg/dL    CREATININE 0.5 0.4 - 1.2 mg/dL    Calcium 8.3 (L) 8.5 - 10.5 mg/dL   CBC    Collection Time: 06/23/20 11:00 PM   Result Value Ref Range    WBC 8.8 4.8 - 10.8 thou/mm3    RBC 5.28 4.20 - 5.40 mill/mm3    Hemoglobin 14.1 12.0 - 16.0 gm/dl    Hematocrit 46.9 37.0 - 47.0 %    MCV 88.8 81.0 - 99.0 fL    MCH 26.7 26.0 - 33.0 pg    MCHC 30.1 (L) 32.2 - 35.5 gm/dl    RDW-CV 16.6 (H) 11.5 - 14.5 %    RDW-SD 53.1 (H) 35.0 - 45.0 fL    Platelets 269 052 - 978 thou/mm3    MPV 12.9 (H) 9.4 - 12.4 fL   Protime-INR    Collection Time: 06/23/20 11:00 PM   Result Value Ref Range    INR 1.04 0.85 - 1.13   Magnesium    Collection Time: 06/23/20 11:00 PM   Result Value Ref Range Magnesium 2.4 1.6 - 2.4 mg/dL   Anion Gap    Collection Time: 06/23/20 11:00 PM   Result Value Ref Range    Anion Gap 9.0 8.0 - 16.0 meq/L   Glomerular Filtration Rate, Estimated    Collection Time: 06/23/20 11:00 PM   Result Value Ref Range    Est, Glom Filt Rate >90 ml/min/1.73m2   TYPE AND SCREEN    Collection Time: 06/23/20 11:01 PM   Result Value Ref Range    ABO O     Rh Factor POS     Antibody Screen NEG    Electrocardiogram, 12-lead     Collection Time: 06/24/20  5:47 AM   Result Value Ref Range    Ventricular Rate 79 BPM    Atrial Rate 79 BPM    P-R Interval 132 ms    QRS Duration 82 ms    Q-T Interval 370 ms    QTc Calculation (Bazett) 424 ms    P Axis 23 degrees    R Axis -23 degrees    T Axis 83 degrees   Basic Metabolic Panel    Collection Time: 06/25/20  2:28 AM   Result Value Ref Range    Sodium 139 135 - 145 meq/L    Potassium 3.9 3.5 - 5.2 meq/L    Chloride 96 (L) 98 - 111 meq/L    CO2 34 (H) 23 - 33 meq/L    Glucose 93 70 - 108 mg/dL    BUN 19 7 - 22 mg/dL    CREATININE 0.3 (L) 0.4 - 1.2 mg/dL    Calcium 8.1 (L) 8.5 - 10.5 mg/dL   CBC    Collection Time: 06/25/20  2:28 AM   Result Value Ref Range    WBC 7.1 4.8 - 10.8 thou/mm3    RBC 4.95 4.20 - 5.40 mill/mm3    Hemoglobin 13.3 12.0 - 16.0 gm/dl    Hematocrit 44.0 37.0 - 47.0 %    MCV 88.9 81.0 - 99.0 fL    MCH 26.9 26.0 - 33.0 pg    MCHC 30.2 (L) 32.2 - 35.5 gm/dl    RDW-CV 16.4 (H) 11.5 - 14.5 %    RDW-SD 53.1 (H) 35.0 - 45.0 fL    Platelets 971 862 - 476 thou/mm3    MPV 12.4 9.4 - 12.4 fL   Anion Gap    Collection Time: 06/25/20  2:28 AM   Result Value Ref Range    Anion Gap 9.0 8.0 - 16.0 meq/L   Glomerular Filtration Rate, Estimated    Collection Time: 06/25/20  2:28 AM   Result Value Ref Range    Est, Glom Filt Rate >90 ml/min/1.73m2       Discharge condition: stable  Disposition: Home  Time spent on discharge: 45 min    Electronically signed by KARSTEN ONEAL MD on 6/25/2020 at 2:21 PM

## 2020-06-25 NOTE — CARE COORDINATION
6/25/20, 11:33 AM EDT    DISCHARGE ON GOING [address] day: 6  Location: -28/028-A Reason for admit: CHF NYHA class II, acute, systolic ([address] [D51.33]   Procedure: 6/16 (Done at [facility]) Echo - EF 20-25%. 6/16 (done at [facility]) Stress test - EF 26%. Abnormal perfusion study, moderate perfusion defect of moderate intensity in the anteroseptal, lateral, and apical region(s) during stress imaging which is most consistent with but may be due to artifact. 6/23 Echo - EF 30-35%. Small mobile structure on the aortic valve likely lambl's excrescence. Other differentials could be vegetation vs thrombus. 6/24 Cardiac Cath -  Severe nonischemic cardiomyopathy. Nonobstructive coronary artery disease. Treatment Plan of Care: Hospitalist, Cardiology and Pulmonology following. O2 has been weaned down to 2L/nc, sat of 91%. Cardiac cath done yesterday, planning to optimize medical treatment and Life Vest ordered. Cardiology and Pulmonology ok with discharge home today. Barriers to Discharge: Home O2 eval to be done. PCP: Emy Gunter  Readmission Risk Score: 15%  Patient Goals/Plan/Treatment Preferences: Pt lives at home with  and son. Planning home today after home O2 eval. Nebulizer ordered for at home at well. Discussed with pt, she chose [address] [place] [address] Road for DME. Will place referral once orders written. 6/25/20, 11:40 AM EDT    Patient goals/plan/ treatment preferences discussed by  and . Patient goals/plan/ treatment preferences reviewed with patient/ family. Patient/ family verbalize understanding of discharge plan and are in agreement with goal/plan/treatment preferences. Understanding was demonstrated using the teach back method.   AVS provided by RN at time of discharge, which includes all necessary medical information pertaining to the patients current course of illness, treatment, post-discharge goals of care, and treatment

## 2020-06-25 NOTE — PROGRESS NOTES
Cardiology Progress Note      Patient:  Woody Hudson  YOB: 1955  MRN: [id number]   Acct: [de-identified]  Admit Date:  6/19/2020  Primary Cardiologist: none  Pt seen by dr Myra Ascencio    Note per dr Ace Chavez FOR CONSULTATION:  CHF exacerbation and newly diagnosed  cardiomyopathy.     HISTORY OF PRESENT ILLNESS:  This is a 80-year-old  female  patient who has been transferred from [place] for further evaluation and  management of congestive heart failure, newly diagnosed actually;  pulmonary edema; and newly diagnosed cardiomyopathy. The patient was  admitted at [place] [address] on 06/17/2020 for CHF exacerbation and pulmonary  edema, and she has been diuresed there, but she was not getting better,  and so the patient was transferred to our medical setup. She has an  echocardiogram done there, the ejection fraction was low with ejection  fraction of around 20% to 25% with severe global hypokinesis with mild  elevation of right ventricular systolic pressure of 48 mmHg while the  patient is on congestive heart failure, so transferred here for further  evaluation and care. The patient stated that she has been having leg  swelling and shortness of breath over the last three months since  03/2020 and started progressively getting worse. She did not seek  medical advise because of the COVID-19, and she was having orthopnea of  marked degree she was almost sitting when in the night, and she has  significant paroxysmal nocturnal dyspnea and marked leg swelling. In  view of that, she went to the primary care physician and was sent to  [place]. She has been treated in [place] for a couple of days with no  significant improvement, hence transferred to our medical setup.     She lost significant amount of fluid still since her admission. Overnight, she has been started on Lasix drip 5 mg per hour, and with  that, the patient has diuresed recently, and the patient stated that  today she is feeling much better.   She is on high-flow oxygen, and  oxygen need is coming down significantly.     The patient stated that she has no known history of coronary artery  disease. No coronary intervention. No history of cardiomyopathy. No  history of cardiac problem whatsoever. \"    Subjective (Events in last 24 hours): pt awake and alert. NAD. No cp. SOB has improved. No orthopnea or edema.   Pt will be going home with O2      Objective:   BP (!) 109/58   Pulse 80   Temp 97.9 °F (36.6 °C) (Oral)   Resp 18   Ht 5' 5\" (1.651 m)   Wt 133 lb 9.6 oz (60.6 kg)   SpO2 91%   BMI 22.23 kg/m²        TELEMETRY: nsr, episodes of tachyarrhythmia ?afib, PVCs, PACs, NSVT 7 beats    Physical Exam:  General Appearance: alert and oriented to person, place and time, in no acute distress  Cardiovascular: normal rate, regular rhythm, normal S1 and S2, no murmurs, rubs, clicks, or gallops, distal pulses intact, no carotid bruits, no JVD  Pulmonary/Chest: clear to auscultation bilaterally- no wheezes, rales or rhonchi, normal air movement, no respiratory distress  Abdomen: soft, non-tender, non-distended, normal bowel sounds, no masses Extremities: no cyanosis, clubbing or edema, pulse   Skin: warm and dry, no rash or erythema  Head: normocephalic and atraumatic  Eyes: pupils equal, round, and reactive to light  Neck: supple and non-tender without mass, no thyromegaly   Musculoskeletal: normal range of motion, no joint swelling, deformity or tenderness  Neurological: alert, oriented, normal speech, no focal findings or movement disorder noted  Right arm, no hematoma, +2 radial pulse    Medications:    bumetanide  1 mg Oral Daily    metoprolol tartrate  12.5 mg Oral BID    apixaban  5 mg Oral BID    sodium chloride flush  10 mL Intravenous 2 times per day    [START ON 6/26/2020] predniSONE  30 mg Oral Daily    Followed by   Jose Duarte ON 6/29/2020] predniSONE  20 mg Oral Daily    Followed by   Jose Duarte ON 7/2/2020] predniSONE  10 mg Oral Daily    glycopyrrolate-formoterol  2 puff Inhalation BID    albuterol  2.5 mg Nebulization Q4H WA    diphenhydrAMINE  50 mg Intravenous Once    hydrocortisone sodium succinate PF  200 mg Intravenous Once    magnesium replacement protocol   Other RX Placeholder    [Held by provider] lisinopril  2.5 mg Oral Daily    pantoprazole  40 mg Oral QAM AC      sodium chloride 50 mL/hr at 06/24/20 1013     sodium chloride flush, 10 mL, PRN  nitroGLYCERIN, 0.4 mg, Q5 Min PRN  ondansetron, 4 mg, Q6H PRN  acetaminophen, 650 mg, Q4H PRN  potassium chloride, 40 mEq, PRN    Or  potassium alternative oral replacement, 40 mEq, PRN    Or  potassium chloride, 10 mEq, PRN        Diagnostics:  Cath 6/24/20  FINDINGS:  Left ventriculography. There is moderate-to-severe  cardiomyopathy, ejection fraction 30%. Global hypokinesis.     HEMODYNAMICS:  Pulmonary artery pressure 37/13 with a mean pulmonary  artery pressure of 21. Pulmonary capillary wedge pressure of 3. Right  ventricular pressure 38/0, RA pressure 2. Left ventricular  end-diastolic pressure 4. Cardiac output by Narendra 3.81 with a cardiac  index of 2.3. Pulmonary artery oxygen saturation 68%.     CORONARY ANGIOGRAM:  1. Right coronary artery:  Proximal RCA patent. Mid RCA has mild  luminal irregularities. RCA is dominant vessel. Distal RCA is patent. RPL and RPDA with mild diffuse disease. 2.  Left main coronary artery patent:  No significant stenosis. 3.  Left circumflex artery:  Proximal LCX has 20% lesion. Distal LCX is  patent with no significant stenotic lesions. 4. LAD:  Proximal LAD is patent. No significant stenotic lesions. Mid  LAD is patent. Distal LAD with mild diffuse disease. D1 is a large  vessel that is patent with no significant stenotic lesions.     MEDICATIONS:  See MAR.     COMPLICATIONS:  None.     ESTIMATED BLOOD LOSS:  Less than 30 mL.     ACCESS:  Right radial artery access for left heart cath.   Right brachial  vein access for right Component Value Date    PROTIME 14.8 06/16/2020    INR 1.04 06/23/2020       HgBA1c:  No results found for: LABA1C    FLP:    Lab Results   Component Value Date    TRIG 56 06/17/2020    HDL 52 06/17/2020       TSH:    Lab Results   Component Value Date    TSH 0.75 06/16/2020         Assessment:    S/p R/L heart cath 6/24/20- mild nonobstructive CAD, normla filling pressures  Acute hypoxic resp failure secondary to pulmonary edema - going home on O2  Acute pulmonary edema - resolved  Acute systolic and diastolic CHF - improving  Severe NICMP - ef 30-35 per echo 6/23/20, ef 30 per cath 6/24/20  Severe biatrial enlargement  Tachyarrhythmia - ?afib - currently nsr  NSVT 7 beats, PVCs, PACs  Small mobile structure on the aortic valve likely lambl's excrescence - no evidence of infection at current time - discussed with dr edwards - no THANH needed at this time - follow as outpt  ? PE - pulmonary following - on eliquis  Severe COPD  Tobacco abuse    Plan:  · lifevest  · Daily I/o and weights  · 2 liter fluid restriction and 2gm sodium diet  · Keep mag >2 and K >4  · No ace/arb due to low bp  · Cont BB/bumex  · Add digoxin  · Add asa 81 and low dose statin for CAD   · Dig level and bmp 1 week  · chf clinic referral  · Event monitor 14 days  · F/up dr Georgiana Monk 3 weeks  · F/up chf clinic 1 week         Electronically signed by Aj Lofton PA-C on 6/25/2020 at 11:30 AM

## 2020-06-26 ENCOUNTER — TELEPHONE (OUTPATIENT)
Dept: CARDIOLOGY | Age: 65
End: 2020-06-26

## 2020-06-26 NOTE — TELEPHONE ENCOUNTER
Noted. Bin farmer from [employer] and he will make sure we are able to view this in [employer] website.

## 2020-07-08 ENCOUNTER — OFFICE VISIT (OUTPATIENT)
Dept: CARDIOLOGY | Age: 65
End: 2020-07-08
Payer: MEDICARE

## 2020-07-08 VITALS
RESPIRATION RATE: 22 BRPM | SYSTOLIC BLOOD PRESSURE: 95 MMHG | HEART RATE: 49 BPM | BODY MASS INDEX: 22.49 KG/M2 | OXYGEN SATURATION: 94 % | WEIGHT: 135 LBS | DIASTOLIC BLOOD PRESSURE: 68 MMHG | HEIGHT: 65 IN

## 2020-07-08 PROCEDURE — 99215 OFFICE O/P EST HI 40 MIN: CPT | Performed by: INTERNAL MEDICINE

## 2020-07-08 RX ORDER — SERTRALINE HYDROCHLORIDE 100 MG/1
100 TABLET, FILM COATED ORAL DAILY
COMMUNITY

## 2020-07-08 RX ORDER — CALCIUM CARBONATE 500(1250)
500 TABLET ORAL DAILY
COMMUNITY

## 2020-07-08 NOTE — PROGRESS NOTES
but she is currently using portable oxygen via concentrator that is triggered by deep breathing. Denies chest pain, pressure or tightness. No fever or cough. No nausea, vomiting or change of bowel habits. I had a very long discussion with the patient and her  regarding proper management of her cardiomyopathy and chronic combined CHF. Differential diagnosis of the etiology of her cardiomyopathy include hypertensive heart disease, PVCs induced and much less likely cardiac amyloidosis \" her ECG showing no signs of low voltage. Her LVH is not very impressive by echo but patient has severe biatrial enlargement and severe diastolic dysfunction). Also her serum protein electrophoresis is normal.  I told her we will hold off further work-up for now and we will reevaluate the etiology in 1 month. We will get cardiac MRI or technetium pyrophosphate scan on follow-up. Also discussed with her the role of cardiac rehabilitation giving her a low ejection fraction. I told her exercise improves the heart function and I encouraged her to start coming to our cardiac rehab program so we can keep close eye on her progress, monitor her weight and blood pressure and make necessary adjustment in her medication as needed. Patient has several questions about her LifeVest.  I answered her and her  and they were satisfied. I told her we have to use a LifeVest for at least 3 months before considering defibrillator therapy. Patient is compliant with a LifeVest.  I told her we have access to her LifeVest data and I usually reviewed with him once a week unless if I got an urgent alarm from her LifeVest.    Also educated the patient about our heart failure program.  I told her she should expect a call from our heart failure nurse Jay on Friday to ask her about her medication compliance, weight and vital signs. This is very helpful to prevent hospital readmission.     She is keeping a very good log of her heart rate, blood pressure and weight since discharge from the hospital.  I reviewed this. Her weight is stable. Blood pressures controlled. Her heart rate readings are low, I told her that her blood pressure machine is reading a low heart rate because she has frequent PVCs. ECG done today showed sinus rhythm with ventricular bigeminy. Heart rate in the low 80s bpm.     Past Medical History:   Diagnosis Date    A-fib ([address]     Acute respiratory failure with hypoxia (HCC) from CHF 6/18/2020    CHF (congestive heart failure) (HCC)     COPD (chronic obstructive pulmonary disease) (HCC)        CURRENT ALLERGIES: Patient has no known allergies. REVIEW OF SYSTEMS: 14 systems were reviewed. Pertinent positives and negatives as above, all else negative. Past Surgical History:   Procedure Laterality Date    CHOLECYSTECTOMY      HERNIA REPAIR      x2    TONSILLECTOMY      Social History:  Social History     Tobacco Use    Smoking status: Current Every Day Smoker     Packs/day: 0.25     Years: 20.00     Pack years: 5.00     Types: Cigarettes    Smokeless tobacco: Current User   Substance Use Topics    Alcohol use: Not Currently    Drug use: Never        CURRENT MEDICATIONS:  Outpatient Medications Marked as Taking for the 7/8/20 encounter (Office Visit) with Joshua Smyth MD   Medication Sig Dispense Refill    calcium carbonate (OSCAL) 500 MG TABS tablet Take 500 mg by mouth daily      sertraline (ZOLOFT) 50 MG tablet Take 50 mg by mouth daily      albuterol (PROVENTIL) (2.5 MG/3ML) 0.083% nebulizer solution Take 3 mLs by nebulization every 4 hours as needed for Wheezing or Shortness of Breath 120 each 3    nitroGLYCERIN (NITROSTAT) 0.4 MG SL tablet up to max of 3 total doses.  If no relief after 1 dose, call 911. 25 tablet 0    apixaban (ELIQUIS) 5 MG TABS tablet Take 1 tablet by mouth 2 times daily 60 tablet 2    metoprolol tartrate (LOPRESSOR) 25 MG tablet Take 0.5 tablets by mouth 2 times daily 30 tablet 0    bumetanide (BUMEX) 1 MG tablet Take 1 tablet by mouth daily 30 tablet 0    pantoprazole (PROTONIX) 40 MG tablet Take 1 tablet by mouth every morning (before breakfast) 30 tablet 2    potassium chloride (KLOR-CON M) 20 MEQ extended release tablet Take 1 tablet by mouth daily 30 tablet 0    digoxin (LANOXIN) 250 MCG tablet Take 1 tablet by mouth daily 30 tablet 0    aspirin 81 MG chewable tablet Take 1 tablet by mouth daily 30 tablet 0    atorvastatin (LIPITOR) 10 MG tablet Take 1 tablet by mouth nightly 30 tablet 0    glycopyrrolate-formoterol (BEVESPI) 9-4.8 MCG/ACT AERO Inhale 2 puffs into the lungs 2 times daily 1 Inhaler 11    albuterol sulfate HFA (VENTOLIN HFA) 108 (90 Base) MCG/ACT inhaler Inhale 2 puffs into the lungs every 6 hours as needed for Wheezing or Shortness of Breath 1 Inhaler 11    Multiple Vitamins-Minerals (MULTIVITAMIN ADULT) CHEW Take 1 tablet by mouth daily            FAMILY HISTORY: No family history of premature CAD. PHYSICAL EXAM:   BP 95/68 (Site: Left Upper Arm, Position: Sitting, Cuff Size: Medium Adult)   Pulse (!) 49   Resp 22   Ht 5' 5\" (1.651 m)   Wt 135 lb (61.2 kg)   SpO2 94%   BMI 22.47 kg/m²  Body mass index is 22.47 kg/m². Constitutional: She is oriented to person, place, and time. She appears well-developed and well-nourished. In no acute distress. HEENT: Normocephalic and atraumatic. No JVD present. Carotid bruit is not present. No mass and no thyromegaly present. No lymphadenopathy present. Cardiovascular: Normal rate, regular irregular rhythm, normal heart sounds and intact distal pulses. Exam reveals no gallop and no friction rub. 2/6 systolic murmur at the apex without radiation. Pulmonary/Chest: Kyphoscoliosis noted. Poor air entry bilaterally. No significant wheezes. Bilatbilateral basal crackles noted. Abdominal: Soft, non-tender. Bowel sounds and aorta are normal. She exhibits no organomegaly, mass or bruit.    Extremities: Trace bilateral lower extremity edema. Compression stockings in place. Normal bilateral lower extremity pulses. Neurological: She is alert and oriented to person, place, and time. No evidence of gross cranial nerve deficit. Coordination appeared normal.   Skin: Skin is warm and dry. There is no rash or diaphoresis. Psychiatric: She has a normal mood and affect.  Her speech is normal and behavior is normal.      MOST RECENT LABS ON RECORD:   Lab Results   Component Value Date    WBC 7.1 06/25/2020    HGB 13.3 06/25/2020    HCT 44.0 06/25/2020     06/25/2020    CHOL 110 06/17/2020    TRIG 56 06/17/2020    HDL 52 06/17/2020    ALT 14 06/23/2020    AST 17 06/23/2020     06/25/2020    K 3.9 06/25/2020    CL 96 (L) 06/25/2020    CREATININE 0.3 (L) 06/25/2020    BUN 19 06/25/2020    CO2 34 (H) 06/25/2020    TSH 0.75 06/16/2020    INR 1.04 06/23/2020       ASSESSMENT:  Patient Active Problem List    Diagnosis Date Noted    Acute systolic CHF (congestive heart failure), NYHA class 3 (HCC)      Priority: High    Acute right heart failure (HCC)      Priority: High    COPD exacerbation (HCC)      Priority: High    Tobacco abuse      Priority: High    Hypotension      Priority: High    S/P cardiac cath      Priority: High    Nonischemic cardiomyopathy (HCC)      Priority: High    COPD, severe (HCC)      Priority: Low    Acute respiratory failure with hypoxia (HCC)      Priority: Low    Cardiomyopathy ([address]      Priority: Low    COPD with exacerbation (HCC)      Priority: Low    Pleural effusion, bilateral      Priority: Low    Arrhythmia      Priority: Low    Bilateral leg edema      Priority: Low    Pulmonary hypertension (HCC)      Priority: Low    Moderate tricuspid regurgitation      Priority: Low    Moderate mitral regurgitation      Priority: Low    Physical deconditioning      Priority: Low    Acute combined systolic and diastolic congestive heart failure, NYHA class 4 ([address] 06/19/2020 Priority: Low    CHF (congestive [facility] failure) ([facility]      Priority: Low    Anasarca 06/17/2020     Priority: Low    Acute pulmonary embolism ([facility] ? / Normal BLE Venous Ultrasound 2020 / Suspect Radiology Overread 06/17/2020     Priority: Low      Diagnosis Orders   1. Dilated cardiomyopathy [facility])  Ambulatory Referral to Cardiac Rehab   2. Chronic combined systolic and diastolic CHF, NYHA class 3 ([address]     3. Frequent PVCs     4. Moderate mitral regurgitation     5. Pulmonary hypertension ([facility]     6. Moderate tricuspid regurgitation     7. History of atrial fibrillation     8. Stage 3 severe COPD by GOLD classification ([facility]     9. Chronic hypoxemic respiratory failure (HCC)     10. Chronic anticoagulation           PLAN:  Patient with extensive medical history as outlined in HPI. Initially presented to the hospital with acute on chronic combined CHF. Found to have severe cardiomyopathy. She has a very long hospital stay between [place] [address] and [facility]. Cardiac catheterization showed no significant CAD, currently treated for nonischemic cardiomyopathy.  Chronic combined heart failure: New York Heart Association Class: IIb (Marked symptoms during daily activities)   Beta Blocker: Continue Metoprolol tartrate (Lopressor) 25 mg 1/2 tab bid.  ACE Inibitor/ARB: I will start her on low-dose angiotensin receptor blocker or low-dose Entresto after reviewing her most recent basic metabolic panel that was done today at [facility].  Continue digoxin 250 mcg daily. Follow-up dig level done today.  Diuretics: Continue bumetinide (Bumex) 1 mg every morning. I also discussed the potential side effects of this medication including lightheadedness and dizziness and instructed them to stop the medication of this occurs and call our office if this occurs.    Heart failure counseling: I told them to continue wearing lower extremity compression stockings and I advised them to try and keep their legs up whenever possible and to limit salt in their diet.  Continue LifeVest.  I had a long discussion with the patient regarding the need for LifeVest.  Please see HPI.  I may consider cardiac MRI or technetium pyrophosphate study on follow-up.  Start phase 2 cardiac rehabilitation.  I will have our heart failure nurse Jay call her Friday to monitor her weight, vital signs, medication compliance and to update me about her symptoms. · Paroxysmal Atrial Fibrillation: Currently maintaining sinus rhythm with very frequent unifocal PVCs.  Beta Blocker: Continue metoprolol 25 mg half tablet twice a day. I will change this to Toprol-XL once we start cardiac rehabilitation.  Continue digoxin 250 mcg daily.  Continue to monitor heart rate and rhythm via LifeVest.   Anti-Arrhythmic: Not indicated.  Stroke Risk: CHADS2-VASc Score: 3/9 (3.2% stroke risk)  o [id number] Score for Atrial Fibrillation Stroke Risk  o  o Risk   o Factors o  o Component Value   o C o CHF Yes 1   o H o HTN No 0   o A2 o Age >= 76 o No,  o (66 y.o.) 0   o D o DM No 0   o S2 o Prior Stroke/TIA No 0   o V o Vascular Disease No 0   o A o Age 74-69 o Yes,  o (66 y.o.) 1   o Sc o Sex female 1   o  o [id number]  o Score o  3   o Score last updated 7/8/20 36:82 PM EDT    o Click here for a link to the UpToDate guideline \"Atrial Fibrillation: Anticoagulation therapy to prevent embolization  o Disclaimer: Risk Score calculation is dependent on accuracy of patient problem list and past encounter diagnosis.  Anticoagulation: Apixaban (Eliquis) 5 mg every 12 hours. · Moderate mitral and tricuspid regurgitation, moderate pulmonary hypertension: symptomatic  · Beta Blocker: Continue metoprolol as above. · ACE Inibitor/ARB: I will get her most recent basal metabolic panel from path lab tomorrow morning.   I will consider adding low-dose angiotensin receptor blocker or Entresto based on her kidney function and potassium level.  · Diuretics: Continue Bumex 1 mg daily. · I advised them to try and keep their legs up whenever possible and to limit salt in their diet. · Counseled regarding use of compression stockings. · I did explain to her that these are functional valve abnormalities. They can get better if heart function and cardiac size improved      · Hyperlipidemia: Mixed  · Cholesterol Reduction Therapy: Continue Atorvastatin (Lipitor) 10 mg daily I discussed the potential benefits of statin therapy as well as the potential risks including myalgia as well as the rare but potentially serious complication of liver or kidney damage. Although rare, I told them that this could be serious and therefore told them to stop the medication immediately and call if they developed any severe muscle aches or pains and they agreed to do so. COPD, chronic hypoxemic respiratory failure: Continue follow-up with Aleshia Gunter       Questionable subsegmental pulmonary embolism:  Currently on Eliquis 5 mg twice daily  Continue home oxygen therapy. Prognosis: Poor prognosis. I spent over 40 minutes with the patient and her  explaining the etiology of her heart failure, treatment options and future perspectives. Once again, thank you for allowing me to participate in this patients care. Please do not hesitate to contact me could I be of further assistance. Sincerely,  Tani Calloway MD, MS, F.A.C.C.   [facility]) [facility], [address] [place] [address], [address]  Phone: [phone], Fax: [phone]    Based on the patients current medical conditions, I believe the risk of significant morbidity and mortality is: high

## 2020-07-10 ENCOUNTER — TELEPHONE (OUTPATIENT)
Dept: CARDIOLOGY | Age: 65
End: 2020-07-10

## 2020-07-10 NOTE — TELEPHONE ENCOUNTER
Called patient to let her know that I spoke with Dr. Ambreen Lovelace and he did not refer her to a nutritionist however does want education given to her via Heart failure nurse (myself) explaining the low sodium diet, etc...left a message with  for patient call office back.

## 2020-07-10 NOTE — TELEPHONE ENCOUNTER
Heart Failure Follow up call: Per Dr Brook Iyer would like patient called to see how she is feeling and doing. Feeling ok. Symptoms: Denies CP, lightheadedness or dizziness, shortness of breath, palpitations    Vital signs:  Weight- patient weight today was 133.7 pounds. And 134 pounds yesterday- weight has been trending down. BP: 134/55     HR: 46     Oxygen: 97-98% on 2 liters of supplemental oxygen. Questions: Patient is awaiting cardiac rehab to call her, however she also mentioned about a nutritionist to call her but no referral in system? Would you like referral to be put in for nutritionist?     Please advise.  Thanks so much

## 2020-07-10 NOTE — TELEPHONE ENCOUNTER
Patient called back-explained to her what a low sodium diet was and what this would consist of. Explained to her the importance of this. I also let her know I would mail her out a packet for a guide to use and that if she had any questions to please call. Patient verbalized understanding.

## 2020-07-16 ENCOUNTER — HOSPITAL ENCOUNTER (OUTPATIENT)
Dept: CARDIAC REHAB | Age: 65
Setting detail: THERAPIES SERIES
Discharge: HOME | End: 2020-07-16
Payer: MEDICARE

## 2020-07-16 VITALS
OXYGEN SATURATION: 96 % | HEART RATE: 51 BPM | RESPIRATION RATE: 16 BRPM | SYSTOLIC BLOOD PRESSURE: 114 MMHG | DIASTOLIC BLOOD PRESSURE: 56 MMHG | BODY MASS INDEX: 22.71 KG/M2 | HEIGHT: 65 IN | WEIGHT: 136.3 LBS

## 2020-07-16 ASSESSMENT — PATIENT HEALTH QUESTIONNAIRE - PHQ9: SUM OF ALL RESPONSES TO PHQ QUESTIONS 1-9: 0

## 2020-07-16 NOTE — PROGRESS NOTES
Cardiac Rehab Initial History and Assessment    [facility] 1955  7/16/2020    Primary Diagnosis: CHF/EF less than 35%/Cardiomyopathy  Living Will: Yes   On File: Yes  Durable Power of :Yes    Medical History  Past Medical History:   Diagnosis Date    [facility]     Acute respiratory failure with hypoxia ([facility] from CHF 6/18/2020    CHF (congestive heart failure) (HCC)     [facility]ch[facility] disease[facility] ([facility]      Past Surgical History:   Procedure Laterality Date    CHOLECYSTECTOMY      DILATATION, ESOPHAGUS      GASTRIC BYPASS SURGERY      HERNIA REPAIR      x2    TONSILLECTOMY         Family History  Family History   Adopted: Yes   Problem Relation Age of Onset    Heart Disease Mother          Symptoms:  1. Angina   [x] None   [] Tightness   [] Shortness of Breath   [] Pressure    [] Nausea   [] Sharp, Stabbing  [] Pallor   [] Indigestion, Heartburn [] Sweaty    Where was discomfort located? Precipitating Factors? Relieved by:    2. Arrhythmia   [] None   [x] Irregular Beats (skips) [] Pacer    [x] Atrial Fibrillation  [] AICD    On any Medications? Metoprolol/Digoxin      3. Congestive Heart Failure   [] None   [x] Pedal Edema  [] Unusual weight gain   [x] SOB with mild exertion [] Fatigue    4. Vascular   [x] None   [] Carotid Narrowing  [] R [] L   [] Peripheral claudication [] R [] L    5. Musculoskeletal    [x] None   [] Back Pain  Where? [] Joint discomfort Where?      Socio-Economic    Marital Status:     Nutrition    Appetite:  []  Too Good    [x]  Good  []  Poor    Diet: Low sodium low fat low chol/1800 ml fluid restriction/day  Eating out 0 times/wk  Alcohol Consumption: [] Yes [x] No   Type:  Frequency:    Caffeine: [] Yes [x] No  Type:  Amount:    Water intake per day: 1800 ml fluid restriction  Vitamins/Natural herbal products: Multivitamin/calcium    Psychological    [] Depression  [] Tearful  [] Fearful  [] Cheerful  [x] Anxious  [x] Motivated  [] Overwhelmed    Treatment: Zoloft    Diabetes    [] Yes  [x] No  How long:   Latest BS:   Frequency of Checks:  Medication:     Stress    Source: Health issues  Relaxation techniques: Watch tv/ Computer games  Hobbies: Tosin    Level of Education    [] 8th Grade  [] Associates  [] Masters  [x] [employer] [] Bachelor  []  Other:    Depression Screening:   [] Have you been feeling sad. ..down in the dumps? [] Have you lost interest in your job, sports, hobbies, friends? [] Do you often feel tired? [] Do you have trouble sleeping or do you sleep too much? [] Have you been gaining or losing weight? [] Do you often feel down on yourself, that everything is your fault? [] Do you have troubled making decisions or concentrating on your work? [] Do you often feel agitated or like you can barely move? [] Do you ever feel that life isn't worth living?  *If greater than 5 symptoms listed,  notified. Cardiac Rehab Pre - Test  1. The heart is a muscle that acts like a pump to deliver oxygen and blood to the rest of the body. [x] True   [] False  2. Healing from the damage of a heart attach is complete in two weeks. [] True   [x] False  3. Smoking has no direct effect on the heart - it only effects your lungs. [] True   [x] False  4. Using all the salt you want is acceptable for all heart patients. [] True   [x] False  5. Chest pain that is relieved by rest or nitroglycerine is called Angina. [] True   [] False  6. Shortness of breath, indigestion, sweating, tightness or pain in your chest are symptoms of a heart attack. [x] True   [] False  7. Swelling of the feet and ankles only means you've been on your feet too much. [] True   [x] False  8. Saturated fats raised your blood cholesterol level more than anything else in your diet. [x] True   [] False  9. High Blood pressure can take care of itself by rest alone. [] True   [x] False  10.  Walking is one of the best exercises for heart attack patients. [x] True   [] False    Physical Findings   BP: (!) 114/56   Pulse: 51   Resp: 16   SpO2: 96 %  Alert and oriented to person, place and time. Respirations unlabored. Using 2L/min oxygen per nasal cannula. Heart tones distant and irregular. Nonpitting lower leg edema bilat. Wearing Life Vest. Denies discomfort while here.     Goals:   -increased stamina/strength to 30-50 total exercise by increasing 1-2 level/wk and 1-2   min/wk  to achieve THR and RPE 11-13  -introduce weights/ therabands 2-4# for 5-10 reps  -manage BP better  -improved cholesterol and  Triglycerides  -develop regular exercise 30 min daily

## 2020-07-20 ENCOUNTER — HOSPITAL ENCOUNTER (OUTPATIENT)
Dept: CARDIAC REHAB | Age: 65
Setting detail: THERAPIES SERIES
Discharge: HOME | End: 2020-07-20
Payer: MEDICARE

## 2020-07-20 PROCEDURE — 93798 PHYS/QHP OP CAR RHAB W/ECG: CPT

## 2020-07-20 RX ORDER — POTASSIUM CHLORIDE 20 MEQ/1
20 TABLET, EXTENDED RELEASE ORAL DAILY
Qty: 90 TABLET | Refills: 3 | Status: SHIPPED | OUTPATIENT
Start: 2020-07-20 | End: 2021-06-15

## 2020-07-20 RX ORDER — ATORVASTATIN CALCIUM 10 MG/1
10 TABLET, FILM COATED ORAL NIGHTLY
Qty: 90 TABLET | Refills: 3 | Status: SHIPPED | OUTPATIENT
Start: 2020-07-20 | End: 2021-06-15

## 2020-07-20 RX ORDER — DIGOXIN 250 MCG
250 TABLET ORAL DAILY
Qty: 90 TABLET | Refills: 3 | Status: SHIPPED | OUTPATIENT
Start: 2020-07-20 | End: 2021-06-15

## 2020-07-20 RX ORDER — BUMETANIDE 1 MG/1
1 TABLET ORAL DAILY
Qty: 90 TABLET | Refills: 3 | Status: SHIPPED | OUTPATIENT
Start: 2020-07-20 | End: 2021-06-15

## 2020-07-20 RX ORDER — PANTOPRAZOLE SODIUM 40 MG/1
40 TABLET, DELAYED RELEASE ORAL
Qty: 90 TABLET | Refills: 3 | Status: SHIPPED | OUTPATIENT
Start: 2020-07-20 | End: 2021-07-12

## 2020-07-20 NOTE — PROGRESS NOTES
Cardiac Rehabilitation   Physician Order Form    Delma Hirsch  1955    [x] Phase 2 ECG Monitored Cardiac Rehabilitation    [] MI   [] PTCA with/without stents  [] CABG  [] Heart Valve Repair/Replaced   [] Stable Angina [x] Other: CHF with LVEF less than 35%/Cardiomyopathy    Onset Date: 7/8/2020                    Cardiac Education Goals: (see individualized treatment plan for specific goals, progression & compliance)    [] Hypertension [x] Physical Inactivity  [x] A & P  [x] Smoking  [x] Coping/Depression  [x] Medications  [] Diabetes  [] Obesity   [x] Angina  [] Hyperlipidemia [x] Stress   [x] Home Exercise                     Prescribed Exercise Plan:    Target HR:       Duration:31-60 minutes  Frequency: 3X/week  Initial Met Level:1.6  Limitations:Life Vest/Oxygen    Modalities:  [x]Treadmill   [x] Recumb. Stepper/bike  [] Elliptical  [x] Air Dyne  [x] Weights/therabands    · Aerobic exercise to total 31-60 minutes. Progressing by 1-2 minutes per week and/or 1-2 levels per week per patient tolerance using various modalities; according to Bryant Scale 11-13 and THR  · Strength training starting with weights 1-3 # / 5-8 reps progressing to 5-10 # / 15-20 reps; therabands red-gray 5-20 reps. Per patient symptoms use:  · Appropriate ACLS Algorhythm for Cardiac Events. · Nitroglycerine 0.4mg SLq 5mins X 3 for angina pain. · 12 lead EKG for c/o chest pain or change in rhythm. · Nasal O2 for SaO2 <90% or symptoms warranted. · Blood sugar monitoring for Hyper/Hypoglycemia symptoms.
Diet: Low fat low sodium low chol with 1800 ml fluid restriction    Intervention:   [x] Dietitian Consult       [x] Nurse/Patient Discussion     [] Diet Class           [] Referred to Diabetes Education     Education:  [] S&S hypo/hyperglycemia  [x] Low fat/low cholesterol diet  [] Weight loss methods      [] Relate Diabetes/CAD     [x] Eating heart healthy handout    Target Goal:  -LDL-C<100 if triglycerides are > 200  -LDL-C < 70 for high risk patients  -HbA1c < 7%  -BMI < 25   Education    Stages of Change:    [] pre-contemplation   [x] Action   [] Contemplate   [] Maintainence   [x] Prep   [] Relapse    Learning Barriers:   [] Speech   [] Cognitive   [] Literacy   [] Visions   [] Hearing    [x] Ready Learn    Knowledge test score: 90%      Family support: [x] Yes  [] No    Tobacco use:   Social History     Tobacco Use   Smoking Status Former Smoker    Packs/day: 0.25    Years: 20.00    Pack years: 5.00    Types: Cigarettes    Last attempt to quit: 2020    Years since quittin.0   Smokeless Tobacco Current User       Intervention:  [] Referred to smoking cessation counselor     [x] Individual education and counseling  [] Tobacco adjunct  [] Informed of education class schedule     Education:   [] Risk Factors/Modifications  [x] Psychological aspects  [x] Angina    [] Medications  [x] CHF      [] Cardiac A&P    Target Goal:  -Complete cessation of tobacco use (if applicable)  -Continued risk factor modifications  -Recognizing signs/symptoms to report  -Proper use of meds    Psychosocial  Stages of Change:    [] pre-contemplation   [x] Action   [] Contemplate   [] Maintainence   [x] Prep   [] Relapse    Psychosocial Test:  Tool Used: Janet Camarena Quality of Life  Score: 30  Depression screening score: 0/9    Intervention:   [] Psych Consult/  [x] Uses stress management skills    [] Physician Referral    [] Stress management class  Medications:     Education:    [] Coping Techniques

## 2020-07-22 ENCOUNTER — HOSPITAL ENCOUNTER (OUTPATIENT)
Dept: CARDIAC REHAB | Age: 65
Setting detail: THERAPIES SERIES
Discharge: HOME | End: 2020-07-22
Payer: MEDICARE

## 2020-07-22 PROCEDURE — 93798 PHYS/QHP OP CAR RHAB W/ECG: CPT

## 2020-07-23 ENCOUNTER — HOSPITAL ENCOUNTER (OUTPATIENT)
Dept: CARDIAC REHAB | Age: 65
Setting detail: THERAPIES SERIES
Discharge: HOME | End: 2020-07-23
Payer: MEDICARE

## 2020-07-23 PROCEDURE — 93798 PHYS/QHP OP CAR RHAB W/ECG: CPT

## 2020-07-27 ENCOUNTER — HOSPITAL ENCOUNTER (OUTPATIENT)
Dept: CARDIAC REHAB | Age: 65
Setting detail: THERAPIES SERIES
Discharge: HOME | End: 2020-07-27
Payer: MEDICARE

## 2020-07-27 PROCEDURE — 93798 PHYS/QHP OP CAR RHAB W/ECG: CPT

## 2020-07-29 ENCOUNTER — HOSPITAL ENCOUNTER (OUTPATIENT)
Dept: CARDIAC REHAB | Age: 65
Setting detail: THERAPIES SERIES
Discharge: HOME | End: 2020-07-29
Payer: MEDICARE

## 2020-07-29 PROCEDURE — 93798 PHYS/QHP OP CAR RHAB W/ECG: CPT

## 2020-07-30 ENCOUNTER — HOSPITAL ENCOUNTER (OUTPATIENT)
Dept: CARDIAC REHAB | Age: 65
Setting detail: THERAPIES SERIES
Discharge: HOME | End: 2020-07-30
Payer: MEDICARE

## 2020-07-30 PROCEDURE — 93798 PHYS/QHP OP CAR RHAB W/ECG: CPT

## 2020-08-03 ENCOUNTER — HOSPITAL ENCOUNTER (OUTPATIENT)
Dept: CARDIAC REHAB | Age: 65
Setting detail: THERAPIES SERIES
Discharge: HOME | End: 2020-08-03
Payer: MEDICARE

## 2020-08-03 PROCEDURE — 93798 PHYS/QHP OP CAR RHAB W/ECG: CPT

## 2020-08-05 ENCOUNTER — HOSPITAL ENCOUNTER (OUTPATIENT)
Dept: CARDIAC REHAB | Age: 65
Setting detail: THERAPIES SERIES
Discharge: HOME | End: 2020-08-05
Payer: MEDICARE

## 2020-08-05 PROCEDURE — 93798 PHYS/QHP OP CAR RHAB W/ECG: CPT

## 2020-08-06 ENCOUNTER — HOSPITAL ENCOUNTER (OUTPATIENT)
Dept: CARDIAC REHAB | Age: 65
Setting detail: THERAPIES SERIES
Discharge: HOME | End: 2020-08-06
Payer: MEDICARE

## 2020-08-06 PROCEDURE — 93798 PHYS/QHP OP CAR RHAB W/ECG: CPT

## 2020-08-10 ENCOUNTER — HOSPITAL ENCOUNTER (OUTPATIENT)
Dept: CARDIAC REHAB | Age: 65
Setting detail: THERAPIES SERIES
Discharge: HOME | End: 2020-08-10
Payer: MEDICARE

## 2020-08-10 PROCEDURE — 93798 PHYS/QHP OP CAR RHAB W/ECG: CPT

## 2020-08-12 ENCOUNTER — HOSPITAL ENCOUNTER (OUTPATIENT)
Dept: CARDIAC REHAB | Age: 65
Setting detail: THERAPIES SERIES
Discharge: HOME | End: 2020-08-12
Payer: MEDICARE

## 2020-08-12 PROCEDURE — 93798 PHYS/QHP OP CAR RHAB W/ECG: CPT

## 2020-08-13 ENCOUNTER — HOSPITAL ENCOUNTER (OUTPATIENT)
Dept: CARDIAC REHAB | Age: 65
Setting detail: THERAPIES SERIES
Discharge: HOME | End: 2020-08-13
Payer: MEDICARE

## 2020-08-13 PROCEDURE — 93798 PHYS/QHP OP CAR RHAB W/ECG: CPT

## 2020-08-14 ENCOUNTER — OFFICE VISIT (OUTPATIENT)
Dept: CARDIOLOGY | Age: 65
End: 2020-08-14
Payer: MEDICARE

## 2020-08-14 ENCOUNTER — HOSPITAL ENCOUNTER (OUTPATIENT)
Age: 65
Discharge: HOME | End: 2020-08-14
Payer: MEDICARE

## 2020-08-14 ENCOUNTER — HOSPITAL ENCOUNTER (OUTPATIENT)
Dept: NON INVASIVE DIAGNOSTICS | Age: 65
Discharge: HOME | End: 2020-08-14
Payer: MEDICARE

## 2020-08-14 VITALS
DIASTOLIC BLOOD PRESSURE: 69 MMHG | RESPIRATION RATE: 18 BRPM | SYSTOLIC BLOOD PRESSURE: 119 MMHG | HEIGHT: 65 IN | OXYGEN SATURATION: 96 % | HEART RATE: 67 BPM | WEIGHT: 139.6 LBS | BODY MASS INDEX: 23.26 KG/M2

## 2020-08-14 LAB
ANION GAP SERPL CALCULATED.3IONS-SCNC: 8 MMOL/L (ref 9–17)
BUN BLDV-MCNC: 13 MG/DL (ref 8–23)
BUN/CREAT BLD: 34 (ref 9–20)
CALCIUM SERPL-MCNC: 8.1 MG/DL (ref 8.6–10.4)
CHLORIDE BLD-SCNC: 101 MMOL/L (ref 98–107)
CO2: 30 MMOL/L (ref 20–31)
CREAT SERPL-MCNC: 0.38 MG/DL (ref 0.5–0.9)
GFR AFRICAN AMERICAN: >60 ML/MIN
GFR NON-AFRICAN AMERICAN: >60 ML/MIN
GFR SERPL CREATININE-BSD FRML MDRD: ABNORMAL ML/MIN/{1.73_M2}
GFR SERPL CREATININE-BSD FRML MDRD: ABNORMAL ML/MIN/{1.73_M2}
GLUCOSE BLD-MCNC: 88 MG/DL (ref 70–99)
HCT VFR BLD CALC: 36.7 % (ref 36.3–47.1)
HEMOGLOBIN: 10.5 G/DL (ref 11.9–15.1)
LV EF: 40 %
LVEF MODALITY: NORMAL
MCH RBC QN AUTO: 26.5 PG (ref 25.2–33.5)
MCHC RBC AUTO-ENTMCNC: 28.6 G/DL (ref 28.4–34.8)
MCV RBC AUTO: 92.7 FL (ref 82.6–102.9)
NRBC AUTOMATED: 0 PER 100 WBC
PDW BLD-RTO: 17.2 % (ref 11.8–14.4)
PLATELET # BLD: 304 K/UL (ref 138–453)
PMV BLD AUTO: 10.1 FL (ref 8.1–13.5)
POTASSIUM SERPL-SCNC: 3.7 MMOL/L (ref 3.7–5.3)
RBC # BLD: 3.96 M/UL (ref 3.95–5.11)
SODIUM BLD-SCNC: 139 MMOL/L (ref 135–144)
WBC # BLD: 9.6 K/UL (ref 3.5–11.3)

## 2020-08-14 PROCEDURE — 36415 COLL VENOUS BLD VENIPUNCTURE: CPT

## 2020-08-14 PROCEDURE — 93306 TTE W/DOPPLER COMPLETE: CPT

## 2020-08-14 PROCEDURE — 99214 OFFICE O/P EST MOD 30 MIN: CPT | Performed by: INTERNAL MEDICINE

## 2020-08-14 PROCEDURE — 80048 BASIC METABOLIC PNL TOTAL CA: CPT

## 2020-08-14 PROCEDURE — 85027 COMPLETE CBC AUTOMATED: CPT

## 2020-08-14 RX ORDER — CHOLECALCIFEROL (VITAMIN D3) 1250 MCG
1.25 CAPSULE ORAL DAILY
COMMUNITY

## 2020-08-14 NOTE — PROGRESS NOTES
Zuleyka Sellers am scribing for and in the presence of Rowena Mccauley MD, F.A.C.C..    Patient: Uriel Jones  : [id number]  Date of Admission: (Not on file)  Primary Care Physician: Lennox Heimlich [id number] Date: 2020    REASON FOR CONSULTATION: 1 Month Follow-Up (Stress/Echo on . Cath . Hx:Dilated Cardiomyopathy,CHF,PVCs,Mod Mitral Regurg,Pulm HTN. She has been doing well, doing cardio rehab- 12 visits so far. Denies:CP, SOB, Lightheaded/dizziness, Palpitations )    HPI: Ms. Julio Petit is a 72 y.o. female who presented today for follow-up. Mrs. Julio Petit initially admitted to [facility] on 2020 because of acute on chronic congestive heart failure. Prior to her admission, she was complaining of worsening shortness of breath and bilateral lower extremity swelling for about 3 months. Patient reported having history of atrial fibrillation. No prior history of myocardial infarction. She denied having family history of premature CAD. She is a longtime smoker, used to smoke 1 pack/day for over 20 years but recently gotten back to almost half a pack per day. She is not aware of any history of hypertension, diabetes or dyslipidemia. She used to work at [employer] and retired 2 years ago. She worked for 1 year in [employer] [facility] but recently she is just staying home particularly after the covered 19 pandemic restrictions started. Patient found to have severe cardiomyopathy. Treated initially at [facility] then transferred to [facility] in [place]. She had left and right heart catheterization that showed no significant CAD. Currently treated for nonischemic cardiomyopathy. She was discharged from [facility] on 2020 with home oxygen therapy and LifeVest.    I had a very long discussion with the patient and her  regarding proper management of her cardiomyopathy and chronic combined CHF.    Differential diagnosis of the etiology of her cardiomyopathy include hypertensive heart disease, PVCs induced and much less likely cardiac amyloidosis \" her ECG showing no signs of low voltage. Her LVH is not very impressive by echo but patient has severe biatrial enlargement and severe diastolic dysfunction). Also her serum protein electrophoresis is normal.  I told her we will hold off further work-up for now. She said she cannot do a cardiac MRI because of some pins in her lungs. I will consider technetium pyrophosphate scan to assess the possibility of cardiac amyloidosis. ECG done showed sinus rhythm with ventricular bigeminy. Heart rate in the low 80s bpm.    Ms. Julio Petit is here today for a 1 month follow up. She reports she has been doing very well. She has been doing great at cardio rehab and has done 12 visits so far. Her breathing is stable and she is able to ambulate using her oxygen at 2 L/min. She has continuous oxygen at home but she is currently using portable oxygen via concentrator that is triggered by deep breathing. Denies chest pain, pressure or tightness. She denies any lightheaded/dizziness or palpitations. No fever or cough. No nausea, vomiting or change of bowel habits. No abdominal pain, bowel issues, bleeding problems, problems with medications or any other issues at this time. I had an echo today and her ejection fraction has improved from 25 to 40%, she still has high PVCs burden. I will discontinue the LifeVest.  Get 24-hour Holter monitor and consider PVCs ablation if needed. She is not a candidate for ICD therapy for primary prevention of sudden cardiac death at this point. Past Medical History:   Diagnosis Date    A-fib [facility])     Acute respiratory failure with hypoxia (HCC) from CHF 6/18/2020    CHF (congestive heart failure) (HCC)     COPD (chronic obstructive pulmonary disease) (HCC)        CURRENT ALLERGIES: Patient has no known allergies.  REVIEW OF SYSTEMS: 14 systems were reviewed. Pertinent positives and negatives as above, all else negative.      Past Surgical History:   Procedure Laterality Date    CHOLECYSTECTOMY      DILATATION, ESOPHAGUS      GASTRIC BYPASS SURGERY      HERNIA REPAIR      x2    TONSILLECTOMY      Social History:  Social History     Tobacco Use    Smoking status: Former Smoker     Packs/day: 0.25     Years: 20.00     Pack years: 5.00     Types: Cigarettes     Last attempt to quit: 2020     Years since quittin.1    Smokeless tobacco: Current User   Substance Use Topics    Alcohol use: Not Currently    Drug use: Never        CURRENT MEDICATIONS:  Outpatient Medications Marked as Taking for the 20 encounter (Office Visit) with Chaka Velázquez MD   Medication Sig Dispense Refill    Cholecalciferol (VITAMIN D3) 1.25 MG (76339 UT) CAPS Take 1.25 capsules by mouth once a week      metoprolol tartrate (LOPRESSOR) 25 MG tablet Take 0.5 tablets by mouth 2 times daily 90 tablet 3    pantoprazole (PROTONIX) 40 MG tablet Take 1 tablet by mouth every morning (before breakfast) 90 tablet 3    bumetanide (BUMEX) 1 MG tablet Take 1 tablet by mouth daily 90 tablet 3    potassium chloride (KLOR-CON M) 20 MEQ extended release tablet Take 1 tablet by mouth daily 90 tablet 3    atorvastatin (LIPITOR) 10 MG tablet Take 1 tablet by mouth nightly 90 tablet 3    digoxin (LANOXIN) 250 MCG tablet Take 1 tablet by mouth daily 90 tablet 3    apixaban (ELIQUIS) 5 MG TABS tablet Take 1 tablet by mouth 2 times daily 180 tablet 3    glycopyrrolate-formoterol (BEVESPI) 9-4.8 MCG/ACT AERO Inhale 2 puffs into the lungs 2 times daily 1 Inhaler 11    calcium carbonate (OSCAL) 500 MG TABS tablet Take 500 mg by mouth daily      sertraline (ZOLOFT) 50 MG tablet Take 50 mg by mouth daily      albuterol (PROVENTIL) (2.5 MG/3ML) 0.083% nebulizer solution Take 3 mLs by nebulization every 4 hours as needed for Wheezing or Shortness of Breath 120 each 3    aspirin 81 MG [id number], DO, Pulmonology, [place]   5. Moderate pulmonary arterial systolic hypertension (HCC)  Echo 2D w doppler w color complete    CBC    Basic Metabolic Panel    [facility] Cuff, DO, Pulmonology, [place]    COVID-19 Ambulatory    Full PFT Study With Bronchodilator   6. Mixed hyperlipidemia  Echo 2D w doppler w color complete    CBC    Basic Metabolic Panel    [facility], DO, Pulmonology, [place]   7. Cardiomyopathy, unspecified type ([address]  SD INTERROGATION EVAL IN PERSON WR DEFIBRILLATOR   8. NICM (nonischemic cardiomyopathy) (HCC)  SD INTERROGATION EVAL IN PERSON WR DEFIBRILLATOR   9. Palpitations   SD INTERROGATION EVAL IN PERSON WR DEFIBRILLATOR       PLAN:     Chronic combined heart failure: New York Heart Association Class: IIb (Marked symptoms during daily activities)   Beta Blocker: Continue Metoprolol tartrate (Lopressor) 25 mg 1/2 tab bid.  ACE Inibitor/ARB:Start Losartan 12.5 mg daily.  Continue digoxin 250 mcg daily. Follow-up dig level done today.  Diuretics: Continue bumetinide (Bumex) 1 mg every morning. I also discussed the potential side effects of this medication including lightheadedness and dizziness and instructed them to stop the medication of this occurs and call our office if this occurs.  Heart failure counseling: I told them to continue wearing lower extremity compression stockings and I advised them to try and keep their legs up whenever possible and to limit salt in their diet.  Continue phase 2 cardiac rehabilitation.  Based on her most recent echo, ejection fraction has improved.  I will bring her back to discuss the possibility of technetium pyrophosphate scan to rule out cardiac amyloidosis although this is a low possibility.  I had our heart failure nurse Jay educated her about low-salt diet, fluid intake and medication compliance. She will follow-up her by phone for further evaluation.     · Paroxysmal Atrial Fibrillation: Currently maintaining sinus rhythm with very frequent unifocal PVCs.  Beta Blocker: Continue metoprolol 25 mg half tablet twice a day.  Continue digoxin 250 mcg daily.  Continue to monitor heart rate and rhythm via LifeVest.   Anti-Arrhythmic: Not indicated.  Stroke Risk: CHADS2-VASc Score: 3/9 (3.2% stroke risk)  o [id number] Score for Atrial Fibrillation Stroke Risk  o  o Risk   o Factors o  o Component Value   o C o CHF Yes 1   o H o HTN No 0   o A2 o Age >= 76 o No,  o (66 y.o.) 0   o D o DM No 0   o S2 o Prior Stroke/TIA No 0   o V o Vascular Disease No 0   o A o Age 74-69 o Yes,  o (66 y.o.) 1   o Sc o Sex female 1   o  o [id number]  o Score o  3   o Score last updated 7/8/20 20:86 PM EDT    o Click here for a link to the UpToDate guideline \"Atrial Fibrillation: Anticoagulation therapy to prevent embolization  o Disclaimer: Risk Score calculation is dependent on accuracy of patient problem list and past encounter diagnosis.  Anticoagulation: Apixaban (Eliquis) 5 mg every 12 hours. · Moderate mitral and tricuspid regurgitation, moderate pulmonary hypertension: symptomatic  · Beta Blocker: Continue metoprolol as above. · ACE Inibitor/ARB:Start Losartan 12.5 mg daily. · Diuretics: Continue Bumex 1 mg daily. · I advised them to try and keep their legs up whenever possible and to limit salt in their diet. · Counseled regarding use of compression stockings. · I did explain to her that these are functional valve abnormalities. They can get better if heart function and cardiac size improved    · Hyperlipidemia: Mixed  · Cholesterol Reduction Therapy: Continue Atorvastatin (Lipitor) 10 mg daily I discussed the potential benefits of statin therapy as well as the potential risks including myalgia as well as the rare but potentially serious complication of liver or kidney damage.  Although rare, I told them that this could be serious and therefore told them to stop the

## 2020-08-14 NOTE — PATIENT INSTRUCTIONS
SURVEY:    You may be receiving a survey from [employer] regarding your visit today. Please complete the survey to enable us to provide the highest quality of care to you and your family. If you cannot score us a very good on any question, please call the office to discuss how we could have made your experience a very good one. Thank you.

## 2020-08-17 ENCOUNTER — TELEPHONE (OUTPATIENT)
Dept: CARDIOLOGY | Age: 65
End: 2020-08-17

## 2020-08-17 ENCOUNTER — HOSPITAL ENCOUNTER (OUTPATIENT)
Dept: CARDIAC REHAB | Age: 65
Setting detail: THERAPIES SERIES
Discharge: HOME | End: 2020-08-17
Payer: MEDICARE

## 2020-08-17 PROCEDURE — 93798 PHYS/QHP OP CAR RHAB W/ECG: CPT

## 2020-08-17 NOTE — TELEPHONE ENCOUNTER
----- Message from Ethel Villa MD sent at 8/16/2020  7:22 PM EDT -----  Please let me see her sometime next week to discuss the echo result and discuss further therapy. Heart function is actually getting better.   Thank you

## 2020-08-17 NOTE — PROGRESS NOTES
Phase II Cardiac Rehab Individualized Treatment Plan-30 Day     Patient Name: Dom Zuluaga  Date of Initial Assessment: 8/17/2020  ACCOUNT #: [de-identified]  Diagnosis: CHF with EF < 35%   Onset Date: 7/8/20  Referring Physician: Dr. Beatriz Gutiérrez  Risk Stratification: high  Session Number: 13   EXERCISE    Stages of Change:   [] pre-contemplation   [x] Action   [] Contemplate   [] Maintainence   [x] Prep   [] Relapse          Exercise Prescription:  Mode: [x] TM [x] B [x] STP [] EL [x] R  Frequency: 3 x week  Duration: 31-60 minutes  Intensity: 2.1 mets  Plan/Goal: Increase 1-2 levels/week or 1-2 min/week to achieve target HR and RPE   11-13. Progression: Patient has progressed from 1.6 mets to 2.1 mets in the past 30 days          Target HR     Maximum HR 96     [] Angina with Exertion THR:    [] Resistance Training Weight (lbs):   Reps:     Hypertension:  [] Yes  [x] No  Resting BP: 110/62  Peak Exercise BP: 104/50  [] Med change? Intervention:  Home Exercise:  Type: walking  Duration: 30 minutes  Frequency: daily   [] Resistance Training    Education:   [x] Equipment Krebs  [x] Self pulse   [] Proper use weights/therabands   [x] S/S to report  [x] Low Na Diet    [x] Warm up/ Cool down  [] BP Medication    [x] RPE Scale   [] Understand BP   [x] Ex Safety   [] Exercise specialist class-Home Exercise       Target Goal:   -Individual Exercise Plan  -BP<140/90 or <130/80 if DM   -Aerobic active 30 + minutes 5-7 days per week    Nutrition    Stages of Change:   [] pre-contemplation   [x] Action   [] Contemplate   [] Maintainence   [x] Prep   [] Relapse    Lipids: (6/17/2020)  Total Cholesterol: 110  Triglycerides: 56  HDL: 52  LDL: 47  [] Med Change? Diabetes:  [] Yes  [x] No  Random BS:  HbA1c:  [] Med Change?     Weight Management:  Wt Goal: 1-2 lbs/wk    Intervention:   [] Dietitian Consult       [x] Nurse/Patient Discussion     [] Diet Class           [] Referred to Diabetes Education     Education:  [] S&S hypo/hyperglycemia  [x] Low fat/low cholesterol diet  [] Weight loss methods      [] Relate Diabetes/CAD     [x] Eating heart healthy handout    Target Goal:  -LDL-C<100 if triglycerides are > 200  -LDL-C < 70 for high risk patients  -HbA1c < 7%  -BMI < 25   Education    Stages of Change:    [] pre-contemplation   [x] Action   [] Contemplate   [] Maintainence   [x] Prep   [] Relapse    Family support: [x] Yes  [] No    Tobacco use: [] Yes  [x] No    Intervention:  [] Referred to smoking cessation counselor     [] Individual education and counseling  [] Tobacco adjunct  [] Informed of education class schedule     Education:   [] Risk Factors/Modifications  [x] Psychological aspects  [x] Angina    [] Medications  [x] CHF      [] Cardiac A&P    Target Goal:  -Complete cessation of tobacco use (if applicable)  -Continued risk factor modifications  -Recognizing signs/symptoms to report  -Proper use of meds    Psychosocial  Stages of Change:    [] pre-contemplation   [x] Action   [] Contemplate   [] Maintainence   [x] Prep   [] Relapse      Intervention:   [] Psych Consult/  [] Uses stress management skills    [] Physician Referral    [] Stress management class   [] Med Change? Education:    [] Coping Techniques   [] Relaxation techniques   [] S/S of Depression    Target Goal:  -Assess presence or absence of depression using a valid screening tool. -Maximize coping skills.  -Positive support system.     Preventative Medication:   [x] Aspirin       [x] Beta Blockade      [x] Statin or other lipid lowering agent     [] Clopidogrel   [] ACE Inhibitor   [x] Other anticoagulation medications     Fall Risk assess: [x] Yes  [] No  Assistive Device:   [] Cane  [] Walker [] Wheel Chair  [] Gait belt    Patient/Program goal:   -increased stamina/strength to 30-50 total exercise by increasing 1-2 level/wk and 1-2   min/wk  to achieve THR and RPE 11-13 Patient started program at 1.6 mets and has increased to 2.1 in the past 30 days.  -introduce weights/ therabands 2-4# for 5-10 reps Will introduce at future session.  -manage BP better Blood pressure WNL. -improved cholesterol and Triglycerides Results from 6/17/2020 reviewed and WNL. Takes Lipitor. Food diary and Rate Your Plate assessment sent to dietician. Dietician consult offered and to be scheduled. -develop regular exercise 30 min daily Currently wearing Life Vest and using continuous oxygen. No activity other than ADL's at present. Will encourage to gradually add daily walking and increase to 30 minutes daily as tolerated.     Physician Changes/Comments:      Cardiac Rehab Staff

## 2020-08-18 PROCEDURE — 93292 WCD DEVICE INTERROGATE: CPT | Performed by: INTERNAL MEDICINE

## 2020-08-19 ENCOUNTER — HOSPITAL ENCOUNTER (OUTPATIENT)
Dept: CARDIAC REHAB | Age: 65
Setting detail: THERAPIES SERIES
Discharge: HOME | End: 2020-08-19
Payer: MEDICARE

## 2020-08-19 PROCEDURE — 93798 PHYS/QHP OP CAR RHAB W/ECG: CPT

## 2020-08-20 ENCOUNTER — HOSPITAL ENCOUNTER (OUTPATIENT)
Dept: CARDIAC REHAB | Age: 65
Setting detail: THERAPIES SERIES
Discharge: HOME | End: 2020-08-20
Payer: MEDICARE

## 2020-08-20 PROCEDURE — 93798 PHYS/QHP OP CAR RHAB W/ECG: CPT

## 2020-08-20 NOTE — PROGRESS NOTES
MEDICAL NUTRITION THERAPY - CARDIOPULMONARY VISIT     Varinder Villaseñor is a 72 y.o.  female    Date: 8/20/2020       Visit with client in cardiac rehab, as a follow up to food diary evaluation on 7/21. Reinforced recommendations from diary eval, for sodium avoidance, food label reading, and variety (utilizing more whole fruits and vegetables). Milady Hernandez stated that she has been making a lot of healthier choices since being in rehab. Is choosing more fresh vegetables, avoiding salt, using Mrs Lizette Dominguez (Onion and Garlic Herb blends) and spray oils as well. Reports use of air fryer and denies using it for processed foods like chicken chunks.   Can identify that she is feeling better with changes.     Specific Goals from visit:  Continue to make better food and cooking choices      Stay active 5 days a week         Time in 1030 / Time out 1040    Electronically signed by Daylin Houston RDN, LD 8/20/2020, 10:44 AM

## 2020-08-21 ENCOUNTER — HOSPITAL ENCOUNTER (OUTPATIENT)
Age: 65
Discharge: HOME | End: 2020-08-21
Payer: MEDICARE

## 2020-08-21 ENCOUNTER — HOSPITAL ENCOUNTER (OUTPATIENT)
Dept: NON INVASIVE DIAGNOSTICS | Age: 65
Discharge: HOME | End: 2020-08-21
Payer: MEDICARE

## 2020-08-21 ENCOUNTER — HOSPITAL ENCOUNTER (OUTPATIENT)
Dept: LAB | Age: 65
Setting detail: SPECIMEN
Discharge: HOME | End: 2020-08-21
Payer: MEDICARE

## 2020-08-21 ENCOUNTER — OFFICE VISIT (OUTPATIENT)
Dept: CARDIOLOGY | Age: 65
End: 2020-08-21
Payer: MEDICARE

## 2020-08-21 VITALS
HEIGHT: 65 IN | BODY MASS INDEX: 23.16 KG/M2 | SYSTOLIC BLOOD PRESSURE: 104 MMHG | DIASTOLIC BLOOD PRESSURE: 65 MMHG | WEIGHT: 139 LBS | RESPIRATION RATE: 18 BRPM | HEART RATE: 69 BPM | OXYGEN SATURATION: 95 %

## 2020-08-21 LAB
ABSOLUTE RETIC #: 0.16 M/UL (ref 0.03–0.08)
FERRITIN: 309 UG/L (ref 13–150)
FOLATE: 17.2 NG/ML
IMMATURE RETIC FRACT: 24.4 % (ref 2.7–18.3)
IRON SATURATION: 22 % (ref 20–55)
IRON: 66 UG/DL (ref 37–145)
RETIC %: 4 % (ref 0.5–1.9)
RETIC HEMOGLOBIN: 30.8 PG (ref 28.2–35.7)
TOTAL IRON BINDING CAPACITY: 302 UG/DL (ref 250–450)
UNSATURATED IRON BINDING CAPACITY: 236 UG/DL (ref 112–347)
VITAMIN B-12: 557 PG/ML (ref 232–1245)

## 2020-08-21 PROCEDURE — 93000 ELECTROCARDIOGRAM COMPLETE: CPT | Performed by: INTERNAL MEDICINE

## 2020-08-21 PROCEDURE — 93226 XTRNL ECG REC<48 HR SCAN A/R: CPT

## 2020-08-21 PROCEDURE — 99214 OFFICE O/P EST MOD 30 MIN: CPT | Performed by: INTERNAL MEDICINE

## 2020-08-21 PROCEDURE — 82746 ASSAY OF FOLIC ACID SERUM: CPT

## 2020-08-21 PROCEDURE — 36415 COLL VENOUS BLD VENIPUNCTURE: CPT

## 2020-08-21 PROCEDURE — 83550 IRON BINDING TEST: CPT

## 2020-08-21 PROCEDURE — C9803 HOPD COVID-19 SPEC COLLECT: HCPCS

## 2020-08-21 PROCEDURE — 85045 AUTOMATED RETICULOCYTE COUNT: CPT

## 2020-08-21 PROCEDURE — 83540 ASSAY OF IRON: CPT

## 2020-08-21 PROCEDURE — 82607 VITAMIN B-12: CPT

## 2020-08-21 PROCEDURE — 93225 XTRNL ECG REC<48 HRS REC: CPT

## 2020-08-21 PROCEDURE — U0003 INFECTIOUS AGENT DETECTION BY NUCLEIC ACID (DNA OR RNA); SEVERE ACUTE RESPIRATORY SYNDROME CORONAVIRUS 2 (SARS-COV-2) (CORONAVIRUS DISEASE [COVID-19]), AMPLIFIED PROBE TECHNIQUE, MAKING USE OF HIGH THROUGHPUT TECHNOLOGIES AS DESCRIBED BY CMS-2020-01-R: HCPCS

## 2020-08-21 PROCEDURE — 82728 ASSAY OF FERRITIN: CPT

## 2020-08-21 RX ORDER — LOSARTAN POTASSIUM 25 MG/1
12.5 TABLET ORAL DAILY
Qty: 45 TABLET | Refills: 3 | Status: SHIPPED | OUTPATIENT
Start: 2020-08-21 | End: 2021-06-15

## 2020-08-21 NOTE — PROGRESS NOTES
Loni Scherer am scribing for and in the presence of Blaine Clark MD, F.A.C.C. Patient: Avril Mark  :   Date of Admission: (Not on file)  Primary Care Physician: Josue Hodge  Today's Date: 2020    REASON FOR CONSULTATION: Follow-up (HX:CHF,PAF,Mod mitral regurg, Mod Pulm HTN, HLD, NICM, palpiattions Pt is here for follow up after having echo done and is currently wearing LifeVest she states doing well Denies:CP,SOB,lightheaded/dizziness,palpitations)    HPI: Ms. Omari Lopez is a 72 y.o. female who presented today for follow-up. Mrs. Omari Lopez initially admitted to [facility] on 2020 because of acute on chronic congestive heart failure. Prior to her admission, she was complaining of worsening shortness of breath and bilateral lower extremity swelling for about 3 months. Patient reported having history of atrial fibrillation. No prior history of myocardial infarction. She denied having family history of premature CAD. She is a longtime smoker, used to smoke 1 pack/day for over 20 years but recently gotten back to almost half a pack per day. She is not aware of any history of hypertension, diabetes or dyslipidemia. She used to work at [employer] and retired 2 years ago. She worked for 1 year in [employer]  but recently she is just staying home particularly after the covered 19 pandemic restrictions started. Patient found to have severe cardiomyopathy. Treated initially at [facility] then transferred to [facility] in [place]. She had left and right heart catheterization that showed no significant CAD. Currently treated for nonischemic cardiomyopathy. She was discharged from [facility] on 2020 with home oxygen therapy and LifeVest.    I had a very long discussion with the patient and her  regarding proper management of her cardiomyopathy and chronic combined CHF.    Differential diagnosis of the etiology of her cardiomyopathy include hypertensive heart disease, PVCs induced and much less likely cardiac amyloidosis \" her ECG showing no signs of low voltage. Her LVH is not very impressive by echo but patient has severe biatrial enlargement and severe diastolic dysfunction). Also her serum protein electrophoresis is normal.  I told her we will hold off further work-up for now. She said she cannot do a cardiac MRI because of some pins in her lungs. I will consider technetium pyrophosphate scan to assess the possibility of cardiac amyloidosis. Echo done on 8/14/2020 EF 40% The left ventricular cavity size is within normal limits and the left ventricular wall thickness is mildly increased. Moderate global hypokinesis with no significant segmental abnormalities. The left atrium is mildly dilated (29-33) with a left atrial volume index of 33 ml/m2. No significant valvular abnormalities. Mild diastolic dysfunction. Compared to the previous study of 6/17/2020, Ejection fraction increased from 25 to 40%    EKG done today in office 8/21/2020 shows: Sinus rhythm. Normal ECG. No no longer is no strep she had 1 isolated PVC. Ms. Klaus Logan is here today for  follow up. She reports she has been doing well. Her breathing is stable and she is able to ambulate using her oxygen at 2 L/min. She has continuous oxygen at home but she is currently using portable oxygen via concentrator that is triggered by deep breathing. She is scheduled to have pulmonary function test done and will be seeing our pulmonary consultant early next month. She has severe kyphoscoliosis on clinical examination and suspect restrictive lung disease but she also has clinical history of COPD as well. Denies chest pain, pressure or tightness. She denies any lightheaded/dizziness or palpitations. No fever or cough. No nausea, vomiting or change of bowel habits.  No abdominal pain, bowel issues, bleeding problems, problems with 90 tablet 3    apixaban (ELIQUIS) 5 MG TABS tablet Take 1 tablet by mouth 2 times daily 180 tablet 3    glycopyrrolate-formoterol (BEVESPI) 9-4.8 MCG/ACT AERO Inhale 2 puffs into the lungs 2 times daily 1 Inhaler 11    calcium carbonate (OSCAL) 500 MG TABS tablet Take 500 mg by mouth daily      sertraline (ZOLOFT) 50 MG tablet Take 50 mg by mouth daily      albuterol (PROVENTIL) (2.5 MG/3ML) 0.083% nebulizer solution Take 3 mLs by nebulization every 4 hours as needed for Wheezing or Shortness of Breath 120 each 3    nitroGLYCERIN (NITROSTAT) 0.4 MG SL tablet up to max of 3 total doses. If no relief after 1 dose, call 911. 25 tablet 0    aspirin 81 MG chewable tablet Take 1 tablet by mouth daily 30 tablet 0    albuterol sulfate HFA (VENTOLIN HFA) 108 (90 Base) MCG/ACT inhaler Inhale 2 puffs into the lungs every 6 hours as needed for Wheezing or Shortness of Breath 1 Inhaler 11    Multiple Vitamins-Minerals (MULTIVITAMIN ADULT) CHEW Take 1 tablet by mouth daily            FAMILY HISTORY: No family history of premature CAD. PHYSICAL EXAM:   /65 (Site: Left Upper Arm, Position: Sitting, Cuff Size: Small Adult)   Pulse 69   Resp 18   Ht 5' 5\" (1.651 m)   Wt 139 lb (63 kg)   SpO2 95%   BMI 23.13 kg/m²  Body mass index is 23.13 kg/m². Constitutional: She is oriented to person, place, and time. She appears well-developed and well-nourished. In no acute distress. HEENT: Normocephalic and atraumatic. No JVD present. Carotid bruit is not present. No mass and no thyromegaly present. No lymphadenopathy present. Cardiovascular: Normal rate, regularly irregular rhythm, normal heart sounds. Exam reveals no gallop and no friction rubs. No murmur was heard. .   Pulmonary/Chest: Severe kyphoscoliosis noted. No significant crackles or rhonchi. Using oxygen via nasal cannula at 2 L/min. Abdominal: Soft, non-tender. Bowel sounds and aorta are normal. She exhibits no organomegaly, mass or bruit. Extremities: Mild bilateral ankle edema. No cyanosis or clubbing. 2+ radial and carotid pulses. Distal extremity pulses: 2+ bilaterally. Neurological: She is alert and oriented to person, place, and time. No evidence of gross cranial nerve deficit. Coordination appeared normal.   Skin: Skin is warm and dry. There is no rash or diaphoresis. Psychiatric: She has a normal mood and affect.  Her speech is normal and behavior is normal.      MOST RECENT LABS ON RECORD:   Lab Results   Component Value Date    WBC 9.6 08/14/2020    HGB 10.5 (L) 08/14/2020    HCT 36.7 08/14/2020     08/14/2020    CHOL 110 06/17/2020    TRIG 56 06/17/2020    HDL 52 06/17/2020    ALT 14 06/23/2020    AST 17 06/23/2020     08/14/2020    K 3.7 08/14/2020     08/14/2020    CREATININE 0.38 (L) 08/14/2020    BUN 13 08/14/2020    CO2 30 08/14/2020    TSH 0.75 06/16/2020    INR 1.04 06/23/2020       ASSESSMENT:  Patient Active Problem List    Diagnosis Date Noted    Acute systolic CHF (congestive heart failure), NYHA class 3 (HCC)      Priority: High    Acute right heart failure (HCC)      Priority: High    COPD exacerbation (HCC)      Priority: High    Tobacco abuse      Priority: High    Hypotension      Priority: High    S/P cardiac cath      Priority: High    Nonischemic cardiomyopathy (HCC)      Priority: High    COPD, severe (HCC)     Acute respiratory failure with hypoxia (HCC)     Cardiomyopathy ([address]     COPD with exacerbation (HCC)     Pleural effusion, bilateral     Arrhythmia     Bilateral leg edema     Pulmonary hypertension (HCC)     Moderate tricuspid regurgitation     Moderate mitral regurgitation     Physical deconditioning     Acute combined systolic and diastolic congestive heart failure, NYHA class 4 ([address] 06/19/2020    CHF (congestive heart failure) ([address]     Anasarca 06/17/2020    Acute pulmonary embolism ([address] ? / Normal BLE Venous Ultrasound 2020 / Suspect Radiology Overread risk)  o [id number] Score for Atrial Fibrillation Stroke Risk  o  o Risk   o Factors o  o Component Value   o C o CHF Yes 1   o H o HTN No 0   o A2 o Age >= 76 o No,  o (66 y.o.) 0   o D o DM No 0   o S2 o Prior Stroke/TIA No 0   o V o Vascular Disease No 0   o A o Age 74-69 o Yes,  o (66 y.o.) 1   o Sc o Sex female 1   o  o [id number]  o Score o  3   o Score last updated 7/8/20 77:70 PM EDT    o Click here for a link to the UpToDate guideline \"Atrial Fibrillation: Anticoagulation therapy to prevent embolization  o Disclaimer: Risk Score calculation is dependent on accuracy of patient problem list and past encounter diagnosis.  Anticoagulation: Apixaban (Eliquis) 5 mg every 12 hours. · Moderate mitral and tricuspid regurgitation, moderate pulmonary hypertension: symptomatic  · Beta Blocker: Continue metoprolol as above. · ACE Inibitor/ARB:Start Losartan 12.5 mg daily. · Diuretics: Continue Bumex 1 mg daily. · I advised them to try and keep their legs up whenever possible and to limit salt in their diet. · Hyperlipidemia: Mixed  · Cholesterol Reduction Therapy: Continue Atorvastatin (Lipitor) 10 mg daily I discussed the potential benefits of statin therapy as well as the potential risks including myalgia as well as the rare but potentially serious complication of liver or kidney damage. Although rare, I told them that this could be serious and therefore told them to stop the medication immediately and call if they developed any severe muscle aches or pains and they agreed to do so. · COPD, chronic hypoxemic respiratory failure:   · Follow up with Pulmonology as scheduled. Anemia; normocytic normochromic anemia: Because of this I ordered iron profile, vitamin B12 and folate level, reticulocyte count and LDH. Once again, thank you for allowing me to participate in this patients care. Please do not hesitate to contact me could I be of further assistance.     Sincerely,  Ariana Johns MD, MS, F. A.C.C. [facility]) [facility], [address], [address]  Phone: [phone], Fax: [phone]    Based on the patients current medical conditions, I believe the risk of significant morbidity and mortality is: high    The documentation recorded by the scribe, accurately and completely reflects the services I personally performed and the decisions made by me. Kaleb Pruitt MD, F.A.C.C.  August 21, 2020

## 2020-08-21 NOTE — PATIENT INSTRUCTIONS
SURVEY:    You may be receiving a survey from [employer] regarding your visit today. Please complete the survey to enable us to provide the highest quality of care to you and your family. If you cannot score us a very good on any question, please call the office to discuss how we could have made your experience a very good one. Thank you.     Your MA today was [place] Juan Davide

## 2020-08-22 LAB — SARS-COV-2, NAA: NOT DETECTED

## 2020-08-24 ENCOUNTER — HOSPITAL ENCOUNTER (OUTPATIENT)
Dept: CARDIAC REHAB | Age: 65
Setting detail: THERAPIES SERIES
Discharge: HOME | End: 2020-08-24
Payer: MEDICARE

## 2020-08-24 PROCEDURE — 93798 PHYS/QHP OP CAR RHAB W/ECG: CPT

## 2020-08-25 LAB
ACQUISITION DURATION: NORMAL S
AVERAGE HEART RATE: 75 BPM
EKG DIAGNOSIS: NORMAL
FASTEST SUPRAVENTRICULAR RATE: 130 BPM
FASTEST VENTRICULAR RATE: 130 BPM
HOLTER MAX HEART RATE: 125 BPM
HOOKUP DATE: NORMAL
HOOKUP TIME: NORMAL
LONGEST SUPRAVENTRICULAR RATE: 126 BPM
LONGEST VE RUN RATE: 130 BPM
MAX HEART RATE TIME/DATE: NORMAL
MIN HEART RATE TIME/DATE: NORMAL
MIN HEART RATE: 56 BPM
NUMBER OF FASTEST SUPRAVENTRICULAR BEATS: 3
NUMBER OF FASTEST VENTRICULAR BEATS: 4
NUMBER OF LONGEST SUPRAVENTRICULAR BEATS: 11
NUMBER OF LONGEST VENTRICULAR BEATS: 4
NUMBER OF QRS COMPLEXES: NORMAL
NUMBER OF SUPRAVENTRICULAR BEATS IN RUNS: 357
NUMBER OF SUPRAVENTRICULAR COUPLETS: 133
NUMBER OF SUPRAVENTRICULAR ECTOPICS: 3782
NUMBER OF SUPRAVENTRICULAR ISOLATED BEATS: 3158
NUMBER OF SUPRAVENTRICULAR RUNS: 91
NUMBER OF VENTRICULAR BEATS IN RUNS: 4
NUMBER OF VENTRICULAR BIGEMINAL CYCLES: 39
NUMBER OF VENTRICULAR COUPLETS: 22
NUMBER OF VENTRICULAR ECTOPICS: 1323
NUMBER OF VENTRICULAR ISOLATED BEATS: 1272
NUMBER OF VENTRICULAR RUNS: 1

## 2020-08-26 ENCOUNTER — HOSPITAL ENCOUNTER (OUTPATIENT)
Dept: CARDIAC REHAB | Age: 65
Setting detail: THERAPIES SERIES
Discharge: HOME | End: 2020-08-26
Payer: MEDICARE

## 2020-08-26 PROCEDURE — 93798 PHYS/QHP OP CAR RHAB W/ECG: CPT

## 2020-08-27 ENCOUNTER — TELEPHONE (OUTPATIENT)
Dept: CARDIOLOGY | Age: 65
End: 2020-08-27

## 2020-08-27 ENCOUNTER — HOSPITAL ENCOUNTER (OUTPATIENT)
Dept: CARDIAC REHAB | Age: 65
Setting detail: THERAPIES SERIES
Discharge: HOME | End: 2020-08-27
Payer: MEDICARE

## 2020-08-27 ENCOUNTER — HOSPITAL ENCOUNTER (OUTPATIENT)
Dept: PULMONOLOGY | Age: 65
Discharge: HOME | End: 2020-08-27
Payer: MEDICARE

## 2020-08-27 PROCEDURE — 6370000000 HC RX 637 (ALT 250 FOR IP): Performed by: INTERNAL MEDICINE

## 2020-08-27 PROCEDURE — 94664 DEMO&/EVAL PT USE INHALER: CPT

## 2020-08-27 PROCEDURE — 94729 DIFFUSING CAPACITY: CPT

## 2020-08-27 PROCEDURE — 94060 EVALUATION OF WHEEZING: CPT

## 2020-08-27 PROCEDURE — 94726 PLETHYSMOGRAPHY LUNG VOLUMES: CPT

## 2020-08-27 PROCEDURE — 93798 PHYS/QHP OP CAR RHAB W/ECG: CPT

## 2020-08-27 RX ORDER — ALBUTEROL SULFATE 90 UG/1
4 AEROSOL, METERED RESPIRATORY (INHALATION) ONCE
Status: COMPLETED | OUTPATIENT
Start: 2020-08-27 | End: 2020-08-27

## 2020-08-27 RX ADMIN — ALBUTEROL SULFATE 4 PUFF: 90 AEROSOL, METERED RESPIRATORY (INHALATION) at 08:15

## 2020-08-31 ENCOUNTER — HOSPITAL ENCOUNTER (OUTPATIENT)
Dept: CARDIAC REHAB | Age: 65
Setting detail: THERAPIES SERIES
Discharge: HOME | End: 2020-08-31
Payer: MEDICARE

## 2020-08-31 PROCEDURE — 93798 PHYS/QHP OP CAR RHAB W/ECG: CPT

## 2020-09-02 NOTE — PROCEDURES
[address], [place] [address]                               PULMONARY FUNCTION    PATIENT NAME: Russ Ramirez                     :        1955  MED REC NO:   [id number]                              ROOM:  ACCOUNT NO:   [de-identified]                           ADMIT DATE: 2020  PROVIDER:     Vivian Hernandez    DATE OF PROCEDURE:  2020    The patient's spirometry shows a severe obstructive defect on  flow-volume loop. FEV1 is 28% of predicted with 38% bronchodilator  change to 39% of predicted. FVC is 57% of predicted with 8%  bronchodilator change to 62% of predicted. FEV1/FVC ratio is 38. The  lung volume by body box shows RV of 139% of predicted and TLC 94% of  predicted. Diffusion capacity is severely reduced, uncorrected at 25%  of predicted and corrected for alveolar volume at 44% of predicted. Airway resistance is normal.    FINAL IMPRESSION:  The study shows stage III COPD with a bronchodilator  response that does not meet ATS criteria; however, a trial of  bronchodilation is recommended. There is also severely reduced  diffusion capacity due to underlying emphysema or interstitial disease  or anemia. Clinical correlation is advised.         Wayne Bower    D: 2020 11:39:48       T: 2020 14:09:53     [email or web address]  Job#: [id number]     Doc#: [id number]    CC:

## 2020-09-03 ENCOUNTER — HOSPITAL ENCOUNTER (OUTPATIENT)
Dept: CARDIAC REHAB | Age: 65
Setting detail: THERAPIES SERIES
Discharge: HOME | End: 2020-09-03
Payer: MEDICARE

## 2020-09-03 ENCOUNTER — TELEPHONE (OUTPATIENT)
Dept: CARDIOLOGY | Age: 65
End: 2020-09-03

## 2020-09-03 PROCEDURE — 93798 PHYS/QHP OP CAR RHAB W/ECG: CPT

## 2020-09-03 NOTE — TELEPHONE ENCOUNTER
Left message for Nikkie Segundo letting her know her lung function testing showing severe COPD. To follow-up with Dr. Daylin Coronel as previously scheduled & to call with questions and/or concerns.

## 2020-09-09 ENCOUNTER — HOSPITAL ENCOUNTER (OUTPATIENT)
Dept: CARDIAC REHAB | Age: 65
Setting detail: THERAPIES SERIES
Discharge: HOME | End: 2020-09-09
Payer: MEDICARE

## 2020-09-09 PROCEDURE — 93798 PHYS/QHP OP CAR RHAB W/ECG: CPT

## 2020-09-10 ENCOUNTER — OFFICE VISIT (OUTPATIENT)
Dept: PULMONOLOGY | Age: 65
End: 2020-09-10
Payer: MEDICARE

## 2020-09-10 ENCOUNTER — HOSPITAL ENCOUNTER (OUTPATIENT)
Dept: CARDIAC REHAB | Age: 65
Setting detail: THERAPIES SERIES
Discharge: HOME | End: 2020-09-10
Payer: MEDICARE

## 2020-09-10 VITALS
HEIGHT: 64 IN | TEMPERATURE: 97.4 F | HEART RATE: 62 BPM | DIASTOLIC BLOOD PRESSURE: 81 MMHG | RESPIRATION RATE: 24 BRPM | BODY MASS INDEX: 24.02 KG/M2 | WEIGHT: 140.7 LBS | OXYGEN SATURATION: 96 % | SYSTOLIC BLOOD PRESSURE: 117 MMHG

## 2020-09-10 PROCEDURE — 99205 OFFICE O/P NEW HI 60 MIN: CPT | Performed by: INTERNAL MEDICINE

## 2020-09-10 PROCEDURE — 93798 PHYS/QHP OP CAR RHAB W/ECG: CPT

## 2020-09-10 ASSESSMENT — ENCOUNTER SYMPTOMS
CHEST TIGHTNESS: 1
ALLERGIC/IMMUNOLOGIC NEGATIVE: 1
SHORTNESS OF BREATH: 1
GASTROINTESTINAL NEGATIVE: 1
EYES NEGATIVE: 1

## 2020-09-10 NOTE — PROGRESS NOTES
week      metoprolol tartrate (LOPRESSOR) 25 MG tablet Take 0.5 tablets by mouth 2 times daily 90 tablet 3    pantoprazole (PROTONIX) 40 MG tablet Take 1 tablet by mouth every morning (before breakfast) 90 tablet 3    bumetanide (BUMEX) 1 MG tablet Take 1 tablet by mouth daily 90 tablet 3    potassium chloride (KLOR-CON M) 20 MEQ extended release tablet Take 1 tablet by mouth daily 90 tablet 3    atorvastatin (LIPITOR) 10 MG tablet Take 1 tablet by mouth nightly 90 tablet 3    digoxin (LANOXIN) 250 MCG tablet Take 1 tablet by mouth daily 90 tablet 3    apixaban (ELIQUIS) 5 MG TABS tablet Take 1 tablet by mouth 2 times daily 180 tablet 3    glycopyrrolate-formoterol (BEVESPI) 9-4.8 MCG/ACT AERO Inhale 2 puffs into the lungs 2 times daily 1 Inhaler 11    calcium carbonate (OSCAL) 500 MG TABS tablet Take 500 mg by mouth daily      sertraline (ZOLOFT) 50 MG tablet Take 50 mg by mouth daily      aspirin 81 MG chewable tablet Take 1 tablet by mouth daily 30 tablet 0    Multiple Vitamins-Minerals (MULTIVITAMIN ADULT) CHEW Take 1 tablet by mouth daily       albuterol (PROVENTIL) (2.5 MG/3ML) 0.083% nebulizer solution Take 3 mLs by nebulization every 4 hours as needed for Wheezing or Shortness of Breath (Patient not taking: Reported on 9/10/2020) 120 each 3    nitroGLYCERIN (NITROSTAT) 0.4 MG SL tablet up to max of 3 total doses. If no relief after 1 dose, call 911. (Patient not taking: Reported on 9/10/2020) 25 tablet 0    albuterol sulfate HFA (VENTOLIN HFA) 108 (90 Base) MCG/ACT inhaler Inhale 2 puffs into the lungs every 6 hours as needed for Wheezing or Shortness of Breath (Patient not taking: Reported on 9/10/2020) 1 Inhaler 11     No current facility-administered medications for this visit.         Family History   Adopted: Yes   Problem Relation Age of Onset    Heart Disease Mother        Social History     Tobacco Use    Smoking status: Former Smoker     Packs/day: 0.25     Years: 20.00     Pack Lymphadenopathy:      Cervical: No cervical adenopathy. Skin:     General: Skin is warm and dry. Neurological:      Mental Status: She is alert and oriented to person, place, and time. Wt Readings from Last 3 Encounters:   09/10/20 140 lb 11.2 oz (63.8 kg)   08/21/20 139 lb (63 kg)   08/14/20 139 lb 9.6 oz (63.3 kg)       Results for orders placed or performed during the hospital encounter of 08/21/20   COVID-19 Ambulatory    Specimen: Nasopharyngeal Swab   Result Value Ref Range    SARS-CoV-2, YELENA Not Detected Not Detected       Assessment:      1. Stage 3 severe COPD by GOLD classification ([address]    2. Stopped smoking with greater than 30 pack year history    3. Acute combined systolic and diastolic congestive heart failure, NYHA class 4 ([address]    4. Nonischemic cardiomyopathy ([address]    5. Pleural effusion, bilateral    6. Paroxysmal atrial fibrillation (HCC)    7. Personal history of smoking          Plan:      1. Continue Bevespi and albuterol. 2. Screen for alpha-1 antitrypsin deficiency  3. Patient currently in cardiac rehab. Would continue same. 4. 6-minute walk test to determine continuing need for oxygen. Although I did advise the patient that if hypoxemic, oxygen improved survival.  5. Yearly flu shot. Patient up-to-date with Pneumovax. 6. Discussed strategies to mitigate risk of COVID-19 infection. 7. Yearly low-dose screening CT scan of the chest.  8. Broad-spectrum antibiotic for purulent exacerbation. 9. Return in 2 months. 10. Thanks for the kind referral.  Please call with questions or problems.       Electronically signed by Amari Grullon DO on 9/10/2020 at 1:10 PM

## 2020-09-10 NOTE — PATIENT INSTRUCTIONS
SURVEY:    You may be receiving a survey from [employer] regarding your visit today. Please complete the survey to enable us to provide the highest quality of care to you and your family. If you cannot score us a very good on any question, please call the office to discuss how we could have made your experience a very good one. Thank you. Patient Education        Learning About Coronavirus [phone])  Coronavirus [phone]): Overview  What is coronavirus (COFTU-69)? The coronavirus disease (COVID-19) is caused by a virus. It is an illness that was first found in [place], [place], in December 2019. It has since spread worldwide. The virus can cause fever, cough, and trouble breathing. In severe cases, it can cause pneumonia and make it hard to breathe without help. It can cause death. Coronaviruses are a large group of viruses. They cause the common cold. They also cause more serious illnesses like Middle East respiratory syndrome (MERS) and severe acute respiratory syndrome (SARS). COVID-19 is caused by a novel coronavirus. That means it's a new type that has not been seen in people before. This virus spreads person-to-person through droplets from coughing and sneezing. It can also spread when you are close to someone who is infected. And it can spread when you touch something that has the virus on it, such as a doorknob or a tabletop. What can you do to protect yourself from coronavirus (COVID-19)? The best way to protect yourself from getting sick is to:  · Avoid areas where there is an outbreak. · Avoid contact with people who may be infected. · Wash your hands often with soap or alcohol-based hand sanitizers. · Avoid crowds and try to stay at least 6 feet away from other people. · Wash your hands often, especially after you cough or sneeze. Use soap and water, and scrub for at least 20 seconds. If soap and water aren't available, use an alcohol-based hand .   · Avoid touching your mouth, nose, and eyes. What can you do to avoid spreading the virus to others? To help avoid spreading the virus to others:  · Cover your mouth with a tissue when you cough or sneeze. Then throw the tissue in the trash. · Use a disinfectant to clean things that you touch often. · Wear a cloth face cover if you have to go to public areas. · Stay home if you are sick or have been exposed to the virus. Don't go to school, work, or public areas. And don't use public transportation, ride-shares, or taxis unless you have no choice. · If you are sick:  ? Leave your home only if you need to get medical care. But call the doctor's office first so they know you're coming. And wear a face cover. ? Wear the face cover whenever you're around other people. It can help stop the spread of the virus when you cough or sneeze. ? Clean and disinfect your home every day. Use household  and disinfectant wipes or sprays. Take special care to clean things that you grab with your hands. These include doorknobs, remote controls, phones, and handles on your refrigerator and microwave. And don't forget countertops, tabletops, bathrooms, and computer keyboards. When to call for help  [id number] anytime you think you may need emergency care. For example, call if:  · You have severe trouble breathing. (You can't talk at all.)  · You have constant chest pain or pressure. · You are severely dizzy or lightheaded. · You are confused or can't think clearly. · Your face and lips have a blue color. · You pass out (lose consciousness) or are very hard to wake up. Call your doctor now if you develop symptoms such as:  · Shortness of breath. · Fever. · Cough. If you need to get care, call ahead to the doctor's office for instructions before you go. Make sure you wear a face cover to prevent exposing other people to the virus. Where can you get the latest information?   The following health organizations are tracking and studying this virus. Their websites contain the most up-to-date information. [employer]  also learn what to do if you think you may have been exposed to the virus. · U.S. Centers for Disease Control and Prevention (CDC): The CDC provides updated news about the disease and travel advice. The website also tells you how to prevent the spread of infection. www.cdc.gov  · World Health Organization [facility]): WHO offers information about the virus outbreaks. WHO also has travel advice. www.who.int  Current as of: May 8, 2020               Content Version: 12.5  © 8194-1685 [employer]. Care instructions adapted under license by [facility] ([facility]). If you have questions about a medical condition or this instruction, always ask your healthcare professional. [address] any warranty or liability for your use of this information.

## 2020-09-14 ENCOUNTER — HOSPITAL ENCOUNTER (OUTPATIENT)
Dept: CARDIAC REHAB | Age: 65
Setting detail: THERAPIES SERIES
Discharge: HOME | End: 2020-09-14
Payer: MEDICARE

## 2020-09-14 PROCEDURE — 93798 PHYS/QHP OP CAR RHAB W/ECG: CPT

## 2020-09-14 NOTE — PROGRESS NOTES
Phase II Cardiac Rehab Individualized Treatment Plan-60 Day      Patient Name: La Nena Green  Date of Initial Assessment: 8/17/2020  ACCOUNT #: [de-identified]  Diagnosis: CHF with EF < 35%   Onset Date: 7/8/20  Referring Physician: Dr. Brook Iyer  Risk Stratification: high  Session Number: 23  EXERCISE     Stages of Change:   []? pre-contemplation              [x]? Action   []? Contemplate           []? Maintainence   [x]? Prep                        []? Relapse                                                          Exercise Prescription:  Mode: [x]? TM [x]? B [x]? STP []? EL [x]? R  Frequency: 3 x week  Duration: 31-60 minutes  Intensity: 2.3 mets  Plan/Goal: Increase 1-2 levels/week or 1-2 min/week to achieve target HR and RPE   11-13. Progression: Patient has progressed from 1.6 mets to 2.3 mets in the past 60 days          Target HR     Maximum HR 77      []? Angina with Exertion         THR:    [x]? Resistance Training          Weight (lbs):1#               Reps: 5-10     Hypertension:  []? Yes  [x]? No  Resting BP: 104/56  Peak Exercise BP: 104/42  []? Med change?     Intervention:  Home Exercise:  Type: walking  Duration: 30 minutes  Frequency: daily   []? Resistance Training     Education:   [x]? Equipment [place]              [x]? Self pulse               []? Proper use weights/therabands   [x]? S/S to report                      [x]? Low Na Diet              [x]? Warm up/ Cool down        []? BP Medication           [x]? RPE Scale                         []? Understand BP   [x]? Ex Safety                           []? Exercise specialist class-Home Exercise                            Target Goal:   -Individual Exercise Plan  -BP<140/90 or <130/80 if DM   -Aerobic active 30 + minutes 5-7 days per week     Nutrition     Stages of Change:   []? pre-contemplation              [x]? Action   []? Contemplate           []? Maintainence   [x]?  Prep                        []? Relapse     Lipids: (6/17/2020)  Total Cholesterol: 110  Triglycerides: 56  HDL: 52  LDL: 47  []? Med Change?      Diabetes:  []? Yes  [x]? No  Random BS:  HbA1c:  []? Med Change?     Weight Management:  Wt Goal: 1-2 lbs/wk     Intervention:   []? Dietitian Consult                                [x]? Nurse/Patient Discussion                 []? Diet Class                                                                             []? Referred to Diabetes Education        Education:  []? S&S hypo/hyperglycemia  [x]? Low fat/low cholesterol diet  []? Weight loss methods                                               []? Relate Diabetes/CAD                        [x]? Eating heart healthy handout     Target Goal:  -LDL-C<100 if triglycerides are > 200  -LDL-C < 70 for high risk patients  -HbA1c < 7%  -BMI < 25   Education     Stages of Change:    []? pre-contemplation              [x]? Action   []? Contemplate           []? Maintainence   [x]? Prep                        []? Relapse     Family support: [x]? Yes  []? No     Tobacco use: []? Yes  [x]? No     Intervention:  []? Referred to smoking cessation counselor                           []? Individual education and counseling  []? Tobacco adjunct  []? Informed of education class schedule      Education:   []? Risk Factors/Modifications             [x]? Psychological aspects  [x]? Angina                                            []? Medications  [x]? CHF                                                  []? Cardiac A&P     Target Goal:  -Complete cessation of tobacco use (if applicable)  -Continued risk factor modifications  -Recognizing signs/symptoms to report  -Proper use of meds     Psychosocial  Stages of Change:    []? pre-contemplation              [x]? Action   []? Contemplate           []? Maintainence   [x]? Prep                        []? Relapse        Intervention:   []? Psych Consult/          [x]? Uses stress management skills       []?  Physician Referral []? Stress management class   []? Med Change?      Education:    [x]? Coping Techniques   [x]? Relaxation techniques   [x]? S/S of Depression     Target Goal:  -Assess presence or absence of depression using a valid screening tool. -Maximize coping skills.  -Positive support system.     Preventative Medication:   [x]? Aspirin                                   [x]? Beta Blockade                       [x]? Statin or other lipid lowering agent                          []? Clopidogrel   []? ACE Inhibitor   [x]? Other anticoagulation medications      Fall Risk assess: [x]? Yes  []? No  Assistive Device:   []? Cane  []? Ferna Meo []? Wheel Chair  []? Gait belt     Patient/Program goal:   -increased stamina/strength to 30-50 total exercise by increasing 1-2 level/wk and 1-2   min/wk  to achieve THR and RPE 11-13 Patient started program at 1.6 mets and has increased to 2.3 in the past 60 days.  -introduce weights/ therabands 2-4# for 5-10 reps Patient is currently using 1 # weights for 5-10 reps. -manage BP better Blood pressure WNL. -improved cholesterol and Triglycerides Results from 6/17/2020 reviewed and WNL. Takes Lipitor. Food diary and Rate Your Plate assessment sent to dietician. Dietician consult offered and to be scheduled. -develop regular exercise 30 min daily Patient is currently walking 20 minutes daily. *Patient is no longer wearing LifeVest as EF has improved.  Supplemental oxygen still being worn but lpm have decreased.     Physician Changes/Comments:        Cardiac Rehab Staff

## 2020-09-16 ENCOUNTER — HOSPITAL ENCOUNTER (OUTPATIENT)
Dept: CARDIAC REHAB | Age: 65
Setting detail: THERAPIES SERIES
Discharge: HOME | End: 2020-09-16
Payer: MEDICARE

## 2020-09-16 PROCEDURE — 93798 PHYS/QHP OP CAR RHAB W/ECG: CPT

## 2020-09-17 ENCOUNTER — HOSPITAL ENCOUNTER (OUTPATIENT)
Dept: CARDIAC REHAB | Age: 65
Setting detail: THERAPIES SERIES
Discharge: HOME | End: 2020-09-17
Payer: MEDICARE

## 2020-09-17 PROCEDURE — 93798 PHYS/QHP OP CAR RHAB W/ECG: CPT

## 2020-09-18 ENCOUNTER — HOSPITAL ENCOUNTER (OUTPATIENT)
Age: 65
Discharge: HOME | End: 2020-09-18
Payer: MEDICARE

## 2020-09-18 PROCEDURE — 82103 ALPHA-1-ANTITRYPSIN TOTAL: CPT

## 2020-09-18 PROCEDURE — 82104 ALPHA-1-ANTITRYPSIN PHENO: CPT

## 2020-09-21 ENCOUNTER — HOSPITAL ENCOUNTER (OUTPATIENT)
Dept: CARDIAC REHAB | Age: 65
Setting detail: THERAPIES SERIES
Discharge: HOME | End: 2020-09-21
Payer: MEDICARE

## 2020-09-21 PROCEDURE — 93798 PHYS/QHP OP CAR RHAB W/ECG: CPT

## 2020-09-22 LAB
ALPHA-1 ANTITRYPSIN PHENOTYPE: NORMAL
ALPHA-1 ANTITRYPSIN: 158 MG/DL (ref 90–200)

## 2020-09-23 ENCOUNTER — HOSPITAL ENCOUNTER (OUTPATIENT)
Dept: CARDIAC REHAB | Age: 65
Setting detail: THERAPIES SERIES
Discharge: HOME | End: 2020-09-23
Payer: MEDICARE

## 2020-09-23 ENCOUNTER — HOSPITAL ENCOUNTER (OUTPATIENT)
Dept: PULMONOLOGY | Age: 65
Discharge: HOME | End: 2020-09-23
Payer: MEDICARE

## 2020-09-23 LAB
DISTANCE WALKED: NORMAL FT
SPO2: NORMAL %

## 2020-09-23 PROCEDURE — 93798 PHYS/QHP OP CAR RHAB W/ECG: CPT

## 2020-09-23 PROCEDURE — 94618 PULMONARY STRESS TESTING: CPT

## 2020-09-24 ENCOUNTER — HOSPITAL ENCOUNTER (OUTPATIENT)
Dept: CARDIAC REHAB | Age: 65
Setting detail: THERAPIES SERIES
Discharge: HOME | End: 2020-09-24
Payer: MEDICARE

## 2020-09-24 PROCEDURE — 93798 PHYS/QHP OP CAR RHAB W/ECG: CPT

## 2020-09-25 ENCOUNTER — TELEPHONE (OUTPATIENT)
Dept: PULMONOLOGY | Age: 65
End: 2020-09-25

## 2020-09-28 ENCOUNTER — TELEPHONE (OUTPATIENT)
Dept: PULMONOLOGY | Age: 65
End: 2020-09-28

## 2020-09-28 ENCOUNTER — HOSPITAL ENCOUNTER (OUTPATIENT)
Dept: CARDIAC REHAB | Age: 65
Setting detail: THERAPIES SERIES
Discharge: HOME | End: 2020-09-28
Payer: MEDICARE

## 2020-09-28 PROCEDURE — 93798 PHYS/QHP OP CAR RHAB W/ECG: CPT

## 2020-09-28 NOTE — TELEPHONE ENCOUNTER
Patient called stated she would like a referral to Pulmonary rehab ,that she talked to you about it  at her [facility] .  Please advise

## 2020-09-30 ENCOUNTER — HOSPITAL ENCOUNTER (OUTPATIENT)
Dept: CARDIAC REHAB | Age: 65
Setting detail: THERAPIES SERIES
Discharge: HOME | End: 2020-09-30
Payer: MEDICARE

## 2020-09-30 PROCEDURE — 93798 PHYS/QHP OP CAR RHAB W/ECG: CPT

## 2020-10-01 ENCOUNTER — HOSPITAL ENCOUNTER (OUTPATIENT)
Dept: CARDIAC REHAB | Age: 65
Setting detail: THERAPIES SERIES
Discharge: HOME | End: 2020-10-01
Payer: MEDICARE

## 2020-10-01 PROCEDURE — 93798 PHYS/QHP OP CAR RHAB W/ECG: CPT

## 2020-10-01 NOTE — TELEPHONE ENCOUNTER
Josiah Case from cardiopulmonary rehab left a message late yesterday afternoon stating that Emmett Romo does qualify for cardiopulmonary rehab but will need an order. Please advise.

## 2020-10-07 ENCOUNTER — HOSPITAL ENCOUNTER (OUTPATIENT)
Dept: CARDIAC REHAB | Age: 65
Setting detail: THERAPIES SERIES
Discharge: HOME | End: 2020-10-07
Payer: MEDICARE

## 2020-10-07 PROCEDURE — 93798 PHYS/QHP OP CAR RHAB W/ECG: CPT

## 2020-10-08 ENCOUNTER — HOSPITAL ENCOUNTER (OUTPATIENT)
Dept: CARDIAC REHAB | Age: 65
Setting detail: THERAPIES SERIES
Discharge: HOME | End: 2020-10-08
Payer: MEDICARE

## 2020-10-08 PROCEDURE — 93798 PHYS/QHP OP CAR RHAB W/ECG: CPT

## 2020-10-08 NOTE — PROGRESS NOTES
Phase II Cardiac Rehab Individualized Treatment Plan-90 Day     Patient Name: Mandi Dunn  Date of Initial Assessment: 10/8/2020  ACCOUNT #: [de-identified]  Diagnosis: CHF W/ EF less than 35%   Onset Date: 07/08/2020  Referring Physician: Dr. Maximo Raymond  Risk Stratification: high  Session Number: 32   EXERCISE    Stages of Change:   [] pre-contemplation   [x] Action   [] Contemplate   [] Maintainence   [] Prep   [] Relapse          Exercise Prescription:  Mode: [x] TM [] B [x] STP [] EL [] R  Frequency: 3x/wk  Duration: 31-60 min  Intensity: 2.3 mets  Plan/Goal: Increase 1-2 levels/week or 1-2 min/week to achieve target HR and RPE   11-13. Progression: Has not increased mets          Target HR     Maximum HR 78     [x] Angina with Exertion THR: 100   [] Resistance Training Weight (lbs):2 lb   Reps: 10    Hypertension:  [x] Yes  [] No  Resting BP: 130/68  Peak Exercise BP: 130/68  [] Med change? Intervention:  Home Exercise:  Type: waling  Duration: 30 min  Frequency: daily   [] Resistance Training    Education:   [x] Equipment [place]  [x] Self pulse   [x] Proper use weights   [x] S/S to report  [x] Low Na Diet    [x] Warm up/ Cool down  [] BP Medication    [x] RPE Scale   [] Understand BP   [x] Ex Safety   [] Exercise specialist class-Home Exercise       Target Goal:   -Individual Exercise Plan  -BP<140/90 or <130/80 if DM   -Aerobic active 30 + minutes 5-7 days per week    Nutrition    Stages of Change:   [] pre-contemplation   [x] Action   [] Contemplate   [] Maintainence   [] Prep   [] Relapse    Lipids: Total Cholesterol: 110  Triglycerides: 56  HDL: 52  LDL: 47[] Med Change? Diabetes:  [] Yes  [] No  Random BS:  HbA1c:  [] Med Change?     Weight Management:  Wt Goal: 1-2 lbs/wk    Intervention:   [x] Dietitian Consult       [x] Nurse/Patient Discussion     [] Diet Class           [] Referred to Diabetes Education     Education:  [] S&S hypo/hyperglycemia  [x] Low fat/low cholesterol diet  [] Weight loss methods      [] Relate Diabetes/CAD     [x] Eating heart healthy handout    Target Goal:  -LDL-C<100 if triglycerides are > 200  -LDL-C < 70 for high risk patients  -HbA1c < 7%  -BMI < 25   Education    Stages of Change:    [] pre-contemplation   [x] Action   [] Contemplate   [] Maintainence   [] Prep   [] Relapse    Family support: [] Yes  [] No    Tobacco use: [] Yes  [] No    Intervention:  [] Referred to smoking cessation counselor     [] Individual education and counseling  [] Tobacco adjunct  [] Informed of education class schedule     Education:   [] Risk Factors/Modifications  [] Psychological aspects  [x] Angina    [] Medications  [x] CHF      [] Cardiac A&P    Target Goal:  -Complete cessation of tobacco use (if applicable)  -Continued risk factor modifications  -Recognizing signs/symptoms to report  -Proper use of meds    Psychosocial  Stages of Change:    [] pre-contemplation   [x] Action   [] Contemplate   [] Maintainence   [] Prep   [] Relapse    Intervention:   [] Psych Consult/  [] Uses stress management skills    [] Physician Referral    [] Stress management class   [] Med Change? Education:    [] Coping Techniques   [] Relaxation techniques   [] S/S of Depression    Target Goal:  -Assess presence or absence of depression using a valid screening tool. -Maximize coping skills.  -Positive support system.     Preventative Medication:   [x] Aspirin       [x] Beta Blockade      [x] Statin or other lipid lowering agent     [] Clopidogrel   [] ACE Inhibitor   [x] Other anticoagulation medications     Fall Risk assess: [] Yes  [x] No  Assistive Device:   [] Cane  [] Walker [] Wheel Chair  [] Gait belt    Patient/Program goal:   -increased stamina/strength to 30-50 total exercise by increasing 1-2 level/wk and 1-2   min/wk  to achieve THR and RPE 11-13 Patient started program at 1.6 mets and has increased to 2.3 in the past 90 days.  -introduce weights/ therabands 2-4# for 5-10 reps Patient is currently using 2 # weights for 5-10 reps. -manage BP better Blood pressure WNL. -improved cholesterol and Triglycerides Results from 6/17/2020 reviewed and WNL. Takes Lipitor. Food diary and Rate Your Plate assessment sent to dietician. Dietician consult offered and to be scheduled. -develop regular exercise 30 min daily Patient is currently walking 20 minutes daily. *Patient is no longer wearing LifeVest as EF has improved. Supplemental oxygen still being worn but lpm have decreased. Increases oxygen to 1.5 l while walking treadmill.     Physician Changes/Comments:      Cardiac Rehab Staff

## 2020-10-12 ENCOUNTER — HOSPITAL ENCOUNTER (OUTPATIENT)
Dept: CARDIAC REHAB | Age: 65
Setting detail: THERAPIES SERIES
Discharge: HOME | End: 2020-10-12
Payer: MEDICARE

## 2020-10-12 PROCEDURE — 93798 PHYS/QHP OP CAR RHAB W/ECG: CPT

## 2020-10-14 ENCOUNTER — HOSPITAL ENCOUNTER (OUTPATIENT)
Dept: CARDIAC REHAB | Age: 65
Setting detail: THERAPIES SERIES
Discharge: HOME | End: 2020-10-14
Payer: MEDICARE

## 2020-10-14 PROCEDURE — 93798 PHYS/QHP OP CAR RHAB W/ECG: CPT

## 2020-10-15 ENCOUNTER — HOSPITAL ENCOUNTER (OUTPATIENT)
Dept: CARDIAC REHAB | Age: 65
Setting detail: THERAPIES SERIES
Discharge: HOME | End: 2020-10-15
Payer: MEDICARE

## 2020-10-15 VITALS — BODY MASS INDEX: 23.99 KG/M2 | WEIGHT: 144 LBS | HEIGHT: 65 IN

## 2020-10-15 PROCEDURE — 93798 PHYS/QHP OP CAR RHAB W/ECG: CPT

## 2020-10-15 NOTE — PROGRESS NOTES
Phase II Cardiac Rehab Individualized Treatment Plan-Discharge    Patient Name: Mitchel Sanford  Date of Initial Assessment: 10/15/2020  ACCOUNT #: [de-identified]  Diagnosis: CHF with EF less than 35%   Onset Date: 7/8/2020  Referring Physician: Dr Herb Joyner  Risk Stratification: High  Session Number: 36     EXERCISE    Stages of Change:   [] pre-contemplation   [] Action   [] Contemplate   [x] Maintainence   [] Prep   [] Relapse          Exercise Prescription:  Mode: [x] TM [] B [x] STP [] EL [x] R  Frequency: 3X/week  Duration: 31-60 minutes  Intensity: METS 2.4  Progression: Has increased METS from 1.6 to 2.4 since program started. [] Angina with Exertion THR:         Maximum HR: 106   [x] Resistance Training Weight (lbs): 2  Reps: 10    Hypertension:  [] Yes  [x] No  Resting BP: 114/68  Peak Exercise BP: 128/78  [] Med Change? Intervention:  Home Exercise:  Type: Walking  Duration: 30 minutes  Frequency: Daily   [] Resistance Training    Depression Screening:  [] Have you been feeling sad. ..down in the dumps? [] Have you lost interest in your job, sports, hobbies, friends? [] Do you often feel tired? [] Do you have trouble sleeping or do you sleep too much? [] Have you been gaining or losing weight? [] Do you often feel down on yourself, that everything is your fault? [] Do you have troubled making decisions or concentrating on your work? [] Do you often feel agitated or like you can barely move? [] Do you ever feel that life isn't worth living?    *If greater than 5 symptoms listed,  notified. Education:   [x] Education Goals met        Target Goal:   -Individual Exercise Plan  -BP<140/90 or <130/80 if DM   -Aerobic active 30 + minutes 5-7 days per week    Nutrition    Stages of Change:   [] pre-contemplation   [] Action   [] Contemplate   [x] Maintainenc   [] Prep   [] Relapse    Lipids: Total Cholesterol: 110  Triglycerides: 56  HDL: 52  LDL: 47  [] Med Change?      Diabetes: [] Yes  [x] No  FBS:   HbA1c:  [] Med Change? Random BS:   [] BS in range    Weight Management:  Weight: 144 lb  Height: 5'5\"  BMI: 24  Wt Goal: 1-2 lbs/wk   Has gained 7.5 lbs since start of program  Special Diet: Low fat low sodium low chol    Intervention:   [x] Dietitian Consult       [x] Nurse/Patient Discussion     [] Diet Class           [] Referred to Diabetes Education     Education:  [x] Education Goals met    Target Goal:  -LDL-C<100 if triglycerides are > 200  -LDL-C < 70 for high risk patients  -HbA1c < 7%  -BMI < 25   Education    Stages of Change:    [] pre-contemplation   [] Action   [] Contemplate   [x] Maintainence   [] Prep   [] Relapse    Knowledge test score: 100%      Family support: [x] Yes  [] No    Tobacco use: [] Yes  [x] No    Intervention:  [] Referred to smoking cessation counselor     [] Individual education and counseling  [] Tobacco adjunct  [] Informed of education class schedule     Education:   [x] Education goals met    Target Goal:  -Complete cessation of tobacco use (if applicable)  -Continued risk factor modifications  -Recognizing signs/symptoms to report  -Proper use of meds    Psychosocial  Stages of Change:    [] pre-contemplation   [] Action   [] Contemplate   [x] Maintainence   [] Prep   [] Relapse    Psychosocial Test:  Tool Used: Janet Camarena Quality of Life  Score: 30  Depression screening score: 0/9  []Medication change? Education:    [x] Education Goals met    Target Goal:  -Assess presence or absence of depression using a valid screening tool. -Maximize coping skills.  -Positive support system.     Preventative Medication:   [x] Aspirin       [x] Beta Blockade      [x] Statin or other lipid lowering agent     [] Clopidogrel   [] ACE Inhibitor   [x] Other anticoagulation medications     Fall Risk assess: [x] Yes  [] No  Assistive Device:   [] Cane  [] Walker [] Wheel Chair  [] Gait belt    Patient/Program goal:   increased stamina/strength to 30-50 total

## 2020-10-19 ENCOUNTER — HOSPITAL ENCOUNTER (OUTPATIENT)
Dept: CARDIAC REHAB | Age: 65
Setting detail: THERAPIES SERIES
Discharge: HOME | End: 2020-10-19
Payer: MEDICARE

## 2020-10-19 VITALS
RESPIRATION RATE: 18 BRPM | SYSTOLIC BLOOD PRESSURE: 114 MMHG | BODY MASS INDEX: 24.14 KG/M2 | OXYGEN SATURATION: 96 % | DIASTOLIC BLOOD PRESSURE: 62 MMHG | WEIGHT: 144.9 LBS | HEART RATE: 61 BPM | HEIGHT: 65 IN

## 2020-10-19 PROCEDURE — G0424 PULMONARY REHAB W EXER: HCPCS

## 2020-10-19 NOTE — PROGRESS NOTES
Pulmonary Rehab Individualized Treatment Plan-Initial     Patient Name: Salina Starks  Date of Initial Assessment: 10/19/2020  ACCOUNT #: [de-identified]  Diagnosis: COPD   Date of last  Visit 9/28/18  Onset Date: 10/01/2020  Referring Physician: Roula Gallo  Risk Stratification: low  Session Number: 1   EXERCISE    Stages of Change:   [] pre-contemplation   [x] Action   [] Contemplate   [] Maintainence   [] Prep   [] Relapse      [x] 6-MWT       Walked ft: 048  Max HR: 90  O2: 1.5 LPM  RPE: 13  SPO2: 93%-95%  MET Level: 2.4             Exercise Prescription:  Mode: [x] TM [x] B [x] STP [] EL [x] R  Frequency: 3 times weekly  Duration: 31-60 minutes  Intensity: 1.8-2.5 METS  Progression:  30-50min total exercise by increasing 1-2 level/wk and/or 1-2min/wk to maintain SaO2 > 90%, achieve THR and RPE 11-13. Will increase workloads and times as tolerated.   SPO2: >90%  [x] O2 - Liters: 1.5 LPM  [] Resistance Training Weights (lbs):   Reps:      Hypertension:  [x] Yes  [] No  Resting BP: 112/64  Peak Exercise BP: 132/70  BP Meds: Losartan 25mg    Intervention:  Home Exercise:  Type: walking  Duration: 30 minutes  Frequency: encouraged daily  O2 (L/min): 1.5 LPM   [] Resistance Training    Education:   [] Equipment [place]  [x] PLB     [] Ex Safety   [x] S/S to report    [] Warm up/ Cool down  [x] O2 Therapy    [x] RPE Scale   [] Energy conservation   [x] Dyspnea scale  [] Exercise specialist class-Home Exercise         Target Goal:   - SPO2 > 90 during exercise  - Individual exercise Rx      Nutrition    Stages of Change:   [] pre-contemplation   [x] Action   [] Contemplate   [] Maintainence   [] Prep   [] Relapse     Diabetes:  [] Yes  [x] No  BS:   HbA1c:  Diabetes Medication:  [] Monitor BS at home     Weight Management:  Last Weight: 144.9  Height: 5'5\"  BMI: 24.2  Wt Goal:   Alcohol:  [] daily   [] weekly  [] special  [x] none   Type:   Amount:    Diet Assessment Tool: Food Diary  Dietary Goal:     [] Diet Class [] Referred to Diabetes Education     Intervention:   [] Dietitian Consult       [] Nurse/Patient Discussion     Education:  [] S&S hypo/hyperglycemia  [] Relate diabetes to pulmonary disease  [x] Healthy/balanced eating habits    Target Goal:  -HbA1c < 7%  -BMI < 25   Education  Learning Barriers:   [] Speech   [] Cognitive   [] Literacy   [] Visions   [] Hearing    [x] Ready Learn    Knowledge test score: 23/21    Stages of Change:    [] pre-contemplation   [x] Action   [] Contemplate   [] Maintainence   [] Prep   [] Relapse     Family support: [x] Yes  [] No    Tobacco use: [] Yes  [x] No  Date quit:06/20  Quit:  [] < 6 mos. [x] > 6 mos.   Quit date set:  # cigarettes smoked per day:   [] smokeless tobacco   Amount:    Breath sounds:  Diminished Bilat    [] chest physio    [] Acapella  [] Flutter     Intervention:  [] Referred to smoking cessation counselor     [] Individual education and counseling  [] Tobacco adjunct  [x] Informed of education classes    Education:   [x] Dangers of smoking  [x] Dyspnea management  [x] Meds/MDI   [x] Pulmonary and secretions A&P; specific disease  [x] Signs of infection  [x] Diaphragmatic Breathing    [] Traveling   [x] Care of home equipment    Target Goal:  -Complete cessation of tobacco use (if applicable)  -Self-man and prevention exacerbation  -Restore to highest level of independance      Psychosocial  Stages of Change:    [] pre-contemplation   [x] Action   [] Contemplate   [] Maintainence   [] Prep   [] Relapse    Psychosocial Test:  Janet Camarena Quality of Life Score: 30.00  Depression screening score: 9/9    Intervention:   [] Psych Consult/   [x] Uses stress management skills    [] Physician Referral     [] Stress management   Medications:     Education:    [] Coping with Chronic illness   [x] Relaxation techniques   [] S/S of Depression   [] Support system    Target Goal:  -Assess presence or absence of depression using a valid screening tool.  -Maximize coping skills.  -Positive support system. Fall Risk assess: [] Yes  [x] No  Assistive Device:   [] Cane  [] Donanthonyae Gama [] Wheel Chair  [] Gait belt    Patient/Program goal:   · Increased stamina/strength to 30-50min total exercise by increasing 1-2 level/wk and/or 1-2min/wk to maintain SaO2 > 90%, achieve THR and RPE 11-13. Will increase workloads and times as tolerated. · Introduce weights/ therabands 1-2# for 5-10 reps. Will introduce weights/therabands when appropriate. · Less SOB on exertion by utilizing pursed-lip breathing. PLB instructed with pt returning demonstration. · Proper use of inhalers, O2 therapy and meds. All medications and Oxygen reviewed with Pt. All questions answered. · Increased knowledge of COPD and optimal management. Education continues. · [place] with chronic health changes. Education continues. · Manage stress by utilizing relaxation techniques. PLB & Deep breathing exercises reviewed with pt. · Increased knowledge of health eating. Food diary and Rate your plate completed and sent to Dietician to review and make recommendations. · Improve/Maintain quality of life. Maintained at current. · Manage/Control blood pressure. BP remains WNL.     Physician Changes/Comments:      Pulmonary Rehab Staff

## 2020-10-19 NOTE — PROGRESS NOTES
Pulmonary Rehab Initial History and Assessment    Humza Price 1955  10/19/2020    Living Will:  [x] Yes [] No   On File:  [x] Yes [] No   Durable Power of :  [x] Yes [] No     Medical History  Past Medical History:   Diagnosis Date    A-fib ([facility]     Acute respiratory failure with hypoxia ([facility] from CHF 6/18/2020    CHF (congestive heart failure) (HCC)     COPD (chronic obstructive pulmonary disease) ([facility]     Hypertension        Symptoms:  [] Cough (frequency):   [x] Wheezing (Onset/Cause): Occ. when sick  [x] Fluid Retention (Where/When): legs- take Bumex  [] Sleeping Problems (# Hours): 7 hrs  [] Sputum (Volume/Color/Viscosity/Frequency):  [x] Dyspnea (onset/cause): with activity  [] Extra Pillows (Number): 1-2    Dyspnea Index (choose one):  [] Class 1 - No dyspnea except on strenuous exercise  [] Class 2 - SOB when walking up short hill  [x] Class 3 - Dyspnea limits walking pace (slower than others) and stops to breathe.  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    Occupational Exposures:   [] Farm/Ranch [] [employer]ines/[employer]ry  [] Gas/Fumes  [] Dust  [] Sandblasting [] Welding  [] [employer]  [] Chemicals  [] Pottery  [] Asbestos    Respiratory Infections/Hospitalizations:  # infections/year: 0 Antibiotic use:  # Hospitalizations/year  1 When/Problem: CHF  Vaccines:   [x] Flu (yr): 2020  [x] Pneumonia (yr): 2020    Activities of Daily Living:  [x] Independent      Needs assist with:    [] Hygiene [] Diet  [] ADL  [] Other (explain):  [] Stairs    Fluid Intake (glasses/day):   Caffeine: 0/day   Water: 5/day    Nutrition:  Appetite: [] good [x] too good [] poor  Eating out: 0/wk  Special Diet: low fat, low sodium    Stress Management:  Stressors:   Relaxation Techniques: PLB & Deep breathing, eyes closed & relax  Leisure activities/Hobbies: Play computer games, adult coloring    Depression Screening:  [] Have you been feeling sad. ..down in the dumps? [] Have you lost interest in your job, sports, hobbies, friends? [] Do you often feel tired? [] Do you have trouble sleeping or do you sleep too much? [] Have you been gaining or losing weight? [] Do you often feel down on yourself, that everything is your fault? [] Do you have troubled making decisions or concentrating on your work? [] Do you often feel agitated or like you can barely move? [] Do you ever feel that life isn't worth living?    *If greater than 5 symptoms listed,  notified. Pre- Program Assessment  1. The primary purpose of the lungs is to:   [] A. Bring in fresh air   [] B. Breathe out stale air   [] C. Pump blood to our body   [x] D. A and B  2. The muscle that does most of the work of breathing is the diaphragm? [x] True   [] False    [] Undecided  3. Smoking:   [] A. Damages the lining of the lungs.   [] B. Paralyzes cilia   [] C. May increase mucous production. [x] D. All of the above  4. My illness makes me feel helpless, confused, and out of control of my life at times. [] True   [x] False   [] Undecided  5. What is your current Diet? : Low fat , low sodium  6. Parts of a treatment plan may include all of the following except:   [] A. Bronchodilator medication. [x] Dory Ripper at a fast pace.   [] C. Drinking lots of fluid.   [] D. Stop smoking. 7. Pursed lip breathing helps you to slow your breathing rate. [x] True   [] False   [] Undecided  8. Pursed lip breathing helps to prevent collapse of the airways. [x] True   [] False   [] Undecided  9. With diaphragmatic breathing your stomach moved out as you inhale. [x] True   [] False   [] Undecided  10.  The environment around you does not affect your

## 2020-10-21 ENCOUNTER — HOSPITAL ENCOUNTER (OUTPATIENT)
Dept: CARDIAC REHAB | Age: 65
Setting detail: THERAPIES SERIES
Discharge: HOME | End: 2020-10-21
Payer: MEDICARE

## 2020-10-21 PROCEDURE — G0424 PULMONARY REHAB W EXER: HCPCS

## 2020-10-22 ENCOUNTER — HOSPITAL ENCOUNTER (OUTPATIENT)
Dept: CARDIAC REHAB | Age: 65
Setting detail: THERAPIES SERIES
Discharge: HOME | End: 2020-10-22
Payer: MEDICARE

## 2020-10-22 ENCOUNTER — HOSPITAL ENCOUNTER (OUTPATIENT)
Age: 65
Discharge: HOME | End: 2020-10-22
Payer: MEDICARE

## 2020-10-22 LAB
ALBUMIN SERPL-MCNC: 4 G/DL (ref 3.5–5.2)
ALBUMIN/GLOBULIN RATIO: 1.3 (ref 1–2.5)
ALP BLD-CCNC: 163 U/L (ref 35–104)
ALT SERPL-CCNC: 12 U/L (ref 5–33)
ANION GAP SERPL CALCULATED.3IONS-SCNC: 9 MMOL/L (ref 9–17)
AST SERPL-CCNC: 18 U/L
BILIRUB SERPL-MCNC: 0.34 MG/DL (ref 0.3–1.2)
BNP INTERPRETATION: NORMAL
BUN BLDV-MCNC: 14 MG/DL (ref 8–23)
BUN/CREAT BLD: 39 (ref 9–20)
CALCIUM SERPL-MCNC: 8.1 MG/DL (ref 8.6–10.4)
CHLORIDE BLD-SCNC: 100 MMOL/L (ref 98–107)
CO2: 32 MMOL/L (ref 20–31)
CREAT SERPL-MCNC: 0.36 MG/DL (ref 0.5–0.9)
GFR AFRICAN AMERICAN: >60 ML/MIN
GFR NON-AFRICAN AMERICAN: >60 ML/MIN
GFR SERPL CREATININE-BSD FRML MDRD: ABNORMAL ML/MIN/{1.73_M2}
GFR SERPL CREATININE-BSD FRML MDRD: ABNORMAL ML/MIN/{1.73_M2}
GLUCOSE BLD-MCNC: 84 MG/DL (ref 70–99)
HCT VFR BLD CALC: 36.6 % (ref 36.3–47.1)
HEMOGLOBIN: 10.7 G/DL (ref 11.9–15.1)
MCH RBC QN AUTO: 26.8 PG (ref 25.2–33.5)
MCHC RBC AUTO-ENTMCNC: 29.2 G/DL (ref 28.4–34.8)
MCV RBC AUTO: 91.5 FL (ref 82.6–102.9)
NRBC AUTOMATED: 0 PER 100 WBC
PDW BLD-RTO: 13.2 % (ref 11.8–14.4)
PLATELET # BLD: 356 K/UL (ref 138–453)
PMV BLD AUTO: 10.4 FL (ref 8.1–13.5)
POTASSIUM SERPL-SCNC: 3.7 MMOL/L (ref 3.7–5.3)
PRO-BNP: 266 PG/ML
RBC # BLD: 4 M/UL (ref 3.95–5.11)
SODIUM BLD-SCNC: 141 MMOL/L (ref 135–144)
TOTAL PROTEIN: 7 G/DL (ref 6.4–8.3)
WBC # BLD: 7.3 K/UL (ref 3.5–11.3)

## 2020-10-22 PROCEDURE — 83880 ASSAY OF NATRIURETIC PEPTIDE: CPT

## 2020-10-22 PROCEDURE — 85027 COMPLETE CBC AUTOMATED: CPT

## 2020-10-22 PROCEDURE — G0424 PULMONARY REHAB W EXER: HCPCS

## 2020-10-22 PROCEDURE — 80061 LIPID PANEL: CPT

## 2020-10-22 PROCEDURE — 80053 COMPREHEN METABOLIC PANEL: CPT

## 2020-10-22 PROCEDURE — 36415 COLL VENOUS BLD VENIPUNCTURE: CPT

## 2020-10-22 PROCEDURE — 82306 VITAMIN D 25 HYDROXY: CPT

## 2020-10-23 LAB
CHOLESTEROL/HDL RATIO: 2.6
CHOLESTEROL: 129 MG/DL
HDLC SERPL-MCNC: 50 MG/DL
LDL CHOLESTEROL: 60 MG/DL (ref 0–130)
TRIGL SERPL-MCNC: 97 MG/DL
VITAMIN D 25-HYDROXY: 10.5 NG/ML (ref 30–100)
VLDLC SERPL CALC-MCNC: NORMAL MG/DL (ref 1–30)

## 2020-10-28 ENCOUNTER — HOSPITAL ENCOUNTER (OUTPATIENT)
Dept: CARDIAC REHAB | Age: 65
Setting detail: THERAPIES SERIES
Discharge: HOME | End: 2020-10-28
Payer: MEDICARE

## 2020-10-28 PROCEDURE — G0424 PULMONARY REHAB W EXER: HCPCS

## 2020-10-29 ENCOUNTER — HOSPITAL ENCOUNTER (OUTPATIENT)
Dept: CARDIAC REHAB | Age: 65
Setting detail: THERAPIES SERIES
Discharge: HOME | End: 2020-10-29
Payer: MEDICARE

## 2020-10-29 PROCEDURE — G0424 PULMONARY REHAB W EXER: HCPCS

## 2020-10-29 NOTE — PROGRESS NOTES
MEDICAL NUTRITION THERAPY - CARDIOPULMONARY 1:1 VISIT      Aquiles Alfonso reports she is making changes. Had spoke with her in August about heart healthy eating. Denied any questions at this time.    Electronically signed by Daniel Samayoa RD, XIMENA on 10/29/2020 at 10:58 AM

## 2020-11-02 ENCOUNTER — HOSPITAL ENCOUNTER (OUTPATIENT)
Dept: CARDIAC REHAB | Age: 65
Setting detail: THERAPIES SERIES
Discharge: HOME | End: 2020-11-02
Payer: MEDICARE

## 2020-11-02 PROCEDURE — G0424 PULMONARY REHAB W EXER: HCPCS

## 2020-11-04 ENCOUNTER — HOSPITAL ENCOUNTER (OUTPATIENT)
Dept: CARDIAC REHAB | Age: 65
Setting detail: THERAPIES SERIES
Discharge: HOME | End: 2020-11-04
Payer: MEDICARE

## 2020-11-04 PROCEDURE — G0424 PULMONARY REHAB W EXER: HCPCS

## 2020-11-05 ENCOUNTER — HOSPITAL ENCOUNTER (OUTPATIENT)
Dept: CARDIAC REHAB | Age: 65
Setting detail: THERAPIES SERIES
Discharge: HOME | End: 2020-11-05
Payer: MEDICARE

## 2020-11-05 PROCEDURE — 93798 PHYS/QHP OP CAR RHAB W/ECG: CPT

## 2020-11-05 PROCEDURE — G0424 PULMONARY REHAB W EXER: HCPCS

## 2020-11-09 ENCOUNTER — HOSPITAL ENCOUNTER (OUTPATIENT)
Dept: CARDIAC REHAB | Age: 65
Setting detail: THERAPIES SERIES
Discharge: HOME | End: 2020-11-09
Payer: MEDICARE

## 2020-11-09 PROCEDURE — G0424 PULMONARY REHAB W EXER: HCPCS

## 2020-11-11 ENCOUNTER — HOSPITAL ENCOUNTER (OUTPATIENT)
Dept: CARDIAC REHAB | Age: 65
Setting detail: THERAPIES SERIES
Discharge: HOME | End: 2020-11-11
Payer: MEDICARE

## 2020-11-11 PROCEDURE — G0424 PULMONARY REHAB W EXER: HCPCS

## 2020-11-12 ENCOUNTER — HOSPITAL ENCOUNTER (OUTPATIENT)
Dept: CARDIAC REHAB | Age: 65
Setting detail: THERAPIES SERIES
Discharge: HOME | End: 2020-11-12
Payer: MEDICARE

## 2020-11-12 ENCOUNTER — OFFICE VISIT (OUTPATIENT)
Dept: PULMONOLOGY | Age: 65
End: 2020-11-12
Payer: MEDICARE

## 2020-11-12 VITALS
DIASTOLIC BLOOD PRESSURE: 54 MMHG | WEIGHT: 148 LBS | BODY MASS INDEX: 24.66 KG/M2 | SYSTOLIC BLOOD PRESSURE: 99 MMHG | TEMPERATURE: 97.5 F | HEIGHT: 65 IN | RESPIRATION RATE: 18 BRPM | OXYGEN SATURATION: 98 % | HEART RATE: 71 BPM

## 2020-11-12 PROCEDURE — 99213 OFFICE O/P EST LOW 20 MIN: CPT | Performed by: INTERNAL MEDICINE

## 2020-11-12 PROCEDURE — G0424 PULMONARY REHAB W EXER: HCPCS

## 2020-11-12 RX ORDER — TIOTROPIUM BROMIDE AND OLODATEROL 3.124; 2.736 UG/1; UG/1
2 SPRAY, METERED RESPIRATORY (INHALATION) EVERY MORNING
Qty: 1 INHALER | Refills: 11 | Status: SHIPPED | OUTPATIENT
Start: 2020-11-12 | End: 2021-05-13 | Stop reason: SDUPTHER

## 2020-11-12 ASSESSMENT — ENCOUNTER SYMPTOMS
GASTROINTESTINAL NEGATIVE: 1
EYES NEGATIVE: 1
SHORTNESS OF BREATH: 1

## 2020-11-12 NOTE — PATIENT INSTRUCTIONS
SURVEY:    You may be receiving a survey from [employer] regarding your visit today. Please complete the survey to enable us to provide the highest quality of care to you and your family. If you cannot score us a very good on any question, please call the office to discuss how we could have made your experience a very good one. Thank you. Patient Education        Learning About Coronavirus [phone])  Coronavirus [phone]): Overview  What is coronavirus ([id number])? The coronavirus disease (COVID-19) is caused by a virus. It is an illness that was first found in December 2019. It has since spread worldwide. The virus can cause fever, cough, and trouble breathing. In severe cases, it can cause pneumonia and make it hard to breathe without help. It can cause death. This virus spreads person-to-person through droplets from coughing and sneezing. It can also spread when you are close to someone who is infected. And it can spread when you touch something that has the virus on it, such as a doorknob or a tabletop. Coronaviruses are a large group of viruses. They cause the common cold. They also cause more serious illnesses like [place] respiratory syndrome (MERS) and severe acute respiratory syndrome (SARS). COVID-19 is caused by a novel coronavirus. That means it's a new type that has not been seen in people before. How is COVID-19 treated? Mild illness can be treated at home, but more serious illness needs to be treated in the hospital. Treatment may include medicines to reduce symptoms, plus breathing support such as oxygen therapy or a ventilator. Other treatments, such as antiviral medicines, may help people who have COVID-19. What can you do to protect yourself from COVID-19? The best way to protect yourself from getting sick is to:  · Avoid areas where there is an outbreak. · Avoid contact with people who may be infected.   · Avoid crowds and try to stay at least 6 feet away from other people. · Wash your hands often, especially after you cough or sneeze. Use soap and water, and scrub for at least 20 seconds. If soap and water aren't available, use an alcohol-based hand . · Avoid touching your mouth, nose, and eyes. What can you do to avoid spreading the virus to others? To help avoid spreading the virus to others:  · Wash your hands often with soap or alcohol-based hand sanitizers. · Cover your mouth with a tissue when you cough or sneeze. Then throw the tissue in the trash. · Use a disinfectant to clean things that you touch often. These include doorknobs, remote controls, phones, and handles on your refrigerator and microwave. And don't forget countertops, tabletops, bathrooms, and computer keyboards. · Wear a cloth face cover if you have to go to public areas. If you know or suspect that you have COVID-19:  · Stay home. Don't go to school, work, or public areas. And don't use public transportation, ride-shares, or taxis unless you have no choice. · Leave your home only if you need to get medical care or testing. But call the doctor's office first so they know you're coming. And wear a face cover. · Limit contact with people in your home. If possible, stay in a separate bedroom and use a separate bathroom. · Wear a face cover whenever you're around other people. It can help stop the spread of the virus when you cough or sneeze. · Clean and disinfect your home every day. Use household  and disinfectant wipes or sprays. Take special care to clean things that you grab with your hands. · Self-isolate until it's safe to be around others again. ? If you have symptoms, it's safe when you haven't had a fever for 3 days and your symptoms have improved and it's been at least 10 days since your symptoms started. ? If you were exposed to the virus but don't have symptoms, it's safe to be around others 14 days after exposure.   ? Talk to your doctor about whether you also need testing, especially if you have a weakened immune system. When to call for help  Call 911 anytime you think you may need emergency care. For example, call if:  · You have severe trouble breathing. (You can't talk at all.)  · You have constant chest pain or pressure. · You are severely dizzy or lightheaded. · You are confused or can't think clearly. · Your face and lips have a blue color. · You passed out (lost consciousness) or are very hard to wake up. Call your doctor now if you develop symptoms such as:  · Shortness of breath. · Fever. · Cough. If you need to get care, call ahead to the doctor's office for instructions before you go. Make sure you wear a face cover to prevent exposing other people to the virus. Where can you get the latest information? The following health organizations are tracking and studying this virus. Their websites contain the most up-to-date information. [place] also learn what to do if you think you may have been exposed to the virus. · U.[place]. Centers for Disease Control and Prevention (CDC): The CDC provides updated news about the disease and travel advice. The website also tells you how to prevent the spread of infection. www.cdc.gov  · World Health Organization [facility]): WHO offers information about the virus outbreaks. WHO also has travel advice. www.who.int  Current as of: July 10, 2020               Content Version: 12.6  © 7351-0710 [employer]. Care instructions adapted under license by [facility] [place] ([facility]). If you have questions about a medical condition or this instruction, always ask your healthcare professional. [address] any warranty or liability for your use of this information.

## 2020-11-12 NOTE — PROGRESS NOTES
Subjective:      Patient ID: Dina Cobb is a 72 y.o. female. HPI  Follow-up visit for stage III COPD. Since her last visit symptoms are stable. Remains in pulmonary rehab. Believe she is benefiting greatly. Short of breath with mild to moderate exertion. Formulary change. They will no longer cover [employer]. Preference is Stiolto. Patient received her flu shot. Alpha-1 antitrypsin level is M1 assess which is a deficiency variant. Overall level normal at 158. No significance relative to patient's lung disease however explained to her that may have potential impact on children and grandchildren. 6-minute walk test showed desaturation down to 79%. Titrated on 2 L of oxygen. Review of Systems   Constitutional: Negative. HENT: Negative. Eyes: Negative. Respiratory: Positive for shortness of breath. Cardiovascular: Negative. Gastrointestinal: Negative. All other systems reviewed and are negative. Objective:     Physical Exam  Vitals signs and nursing note reviewed. Constitutional:       Appearance: She is well-developed. HENT:      Head: Normocephalic. Eyes:      General: No scleral icterus. Conjunctiva/sclera: Conjunctivae normal.   Neck:      Musculoskeletal: Neck supple. Thyroid: No thyromegaly. Vascular: No JVD. Trachea: No tracheal deviation. Cardiovascular:      Rate and Rhythm: Normal rate and regular rhythm. Heart sounds: Normal heart sounds. No murmur. No gallop. Pulmonary:      Effort: Pulmonary effort is normal. No respiratory distress. Breath sounds: No wheezing or rales. Comments: AP diameter of chest increased. Thoracic expansion and diaphragmatic excursion diminished. BS diminished and expiratory phase prolonged. No dullness to percussion or tenderness to palpation. Chest:      Chest wall: No tenderness. Abdominal:      Palpations: Abdomen is soft. Tenderness: There is no abdominal tenderness. Assessment:         1. Stage 3 severe COPD by GOLD classification ([facility] [address]    2. Nonischemic cardiomyopathy ([facility] [address]    3. Personal history of smoking          Plan:      1. Stiolto 2 puffs daily. 2. Continue pulmonary rehab. Improves quality of life. 3. Continue oxygen. Improved survival.  4. Discussed strategies to mitigate risk of COVID-19 infection. 5. Patient does not meet criteria for screening CT scan of the chest.  6. Return in 6 months.      Electronically signed by Adryan Cornelius DO on 11/12/2020at 2:49 PM

## 2020-11-19 ENCOUNTER — HOSPITAL ENCOUNTER (OUTPATIENT)
Dept: CARDIAC REHAB | Age: 65
Setting detail: THERAPIES SERIES
Discharge: HOME | End: 2020-11-19
Payer: MEDICARE

## 2020-11-19 PROCEDURE — 93798 PHYS/QHP OP CAR RHAB W/ECG: CPT

## 2020-11-19 PROCEDURE — G0424 PULMONARY REHAB W EXER: HCPCS

## 2020-11-19 NOTE — PROGRESS NOTES
Pulmonary Rehab Individualized Treatment Plan-30 day    Patient Name: Laith Gant  Date of Initial Assessment: 11/19/2020  ACCOUNT #: [de-identified]  Diagnosis: COPD   Date of last  Visit 11/12/2020  Onset Date: 10/1/20  Referring Physician: Tanisha Flores  Risk Stratification: low  Session Number: 11     EXERCISE    Stages of Change:   [] pre-contemplation   [x] Action   [] Contemplate    [] Maintainence   [] Prep    [] Relapse           Exercise Prescription:  Mode: [x] TM [x] B [x] STP [x] R  Frequency: 3 times weekly  Duration: 31-60 minutes  Intensity: 2.0-2.5 METS  Progression: 30-50min total exercise by increasing 1-2 level/wk and/or 1-2min/wk to maintain SaO2 > 90%, achieve THR and RPE 12-16. Pt has maintained speed at 1.6 but added 0.5 incline on the Treadmill. METS have increased from an initial of 2.3 to a current METS of 2.4. SPO2: >90%  [x] O2 - Liters: 1-2 lpm  [] Resistance Training Weights (lbs):  2 Reps:  5-10    Hypertension:  [x] Yes  [] No  Resting BP: 122/54  Peak Exercise BP: 108/58  [] Med change? Intervention:  Home Exercise:  Type: walking  Duration: 30 minutes  Frequency: encouraged daily  O2 (L/min): 1-2 LPM   [] Resistance Training    Education:   [x] Equipment [place]  [x] PLB     [x] Ex Safety   [x] S/S to report    [x] Warm up/ Cool down  [x] O2 Therapy    [x] RPE Scale   [x] Energy conservation   [x] Dyspnea scale  [] Exercise specialist class-Home exercise         Target Goal:   - SPO2 > 90 during exercise  - Individual exercise Rx      Nutrition    Stages of Change:   [] pre-contemplation   [x] Action   [] Contemplate    [] Maintainence   [] Prep    [] Relapse     Diabetes:  [] Yes  [x] No  BS:   HbA1c:  Random BS:  BS in range: [] yes [] no  [] Med change? Weight Management:  Current Wt: 145. 5  Wt Goal: Maintain weight at a healthy BMI    Dietary Goal:         Intervention:   [x] Dietitian Consult   Food diary and Rate your plate completed and sent to Dietician to review and make recommendations. Pt is awaiting a 1:1 meeting. [x] Nurse/Patient Discussion     [] Diet Class          Education:  [] S&S hypo/hyperglycemia  [] Relate diabetes to pulmonary disease  [x] Healthy/balanced eating habits    Target Goal:  -HbA1c < 7%  -BMI < 25   Education    Stages of Change:    [] pre-contemplation   [x] Action   [] Contemplate    [] Maintainence   [] Prep    [] Relapse     Family support: [x] Yes  [] No    Tobacco use: [] Yes  [x] No  Date quit:06/20  Quit date set:  # cigarettes smoked per day: 1 PPD   [] smokeless tobacco   Amount:    Breath sounds:    [] chest physio     [] Flutter   [] Acapella     Intervention:  [] Referred to smoking cessation counselor     [] Individual education and counseling  [] Tobacco adjunct  [x] Informed of education classes     Education:   [x] Dangers of smoking  [x] Dyspnea management  [x] Meds/MDI   [x] Pulmonary and secretion A&P; specific disease  [x] signs of infection  [x] Diaphragmatic Breathing    [x] Traveling   [x] Care of Home equipment    Target Goal:  -Complete cessation of tobacco use (if applicable)  -Self-man and prevention exacerbation  -Restore to highest level of independance      Psychosocial  Stages of Change:    [] pre-contemplation   [x] Action   [] Contemplate    [] Maintainence   [] Prep    [] Relapse    Intervention:   [] Psych Consult/   [x] Uses stress management    [] Physician Referral     [] Stress management class   [] Med change? Education:    [] Coping with Chronic illness   [x] Relaxation techniques   [x] S/S of Depression   [x] Support system    Target Goal:  -Assess presence or absence of depression using a valid screening tool. -Maximize coping skills.  -Positive support system. Patient/Program goal:   · Increased stamina/strength to 30-50min total exercise by increasing 1-2 level/wk and/or 1-2min/wk to maintain SaO2 > 90%, achieve THR and RPE 12-16.  Pt has maintained speed at 1.6 but added 0.5 incline on the Treadmill. METS have increased from an initial of 2.3 to a current METS of 2.4.  · Introduce weights/ therabands 1-2# for 5-10 reps. Pt currently utilizing 2 # weights at each session. · Less SOB on exertion by utilizing pursed-lip breathing. PLB instructed with pt returning demonstration. Pt uses PLB when appropriate. · Proper use of inhalers, O2 therapy and meds. All medications and Oxygen reviewed with Pt. All questions answered. · Increased knowledge of COPD and optimal management. Education continues. · [place] with chronic health changes. Education continues. · Manage stress by utilizing relaxation techniques. PLB & Deep breathing exercises reviewed with pt. Pt managing stress effectively. · Increased knowledge of health eating. Food diary and Rate your plate completed and sent to Dietician to review and make recommendations. Recommendations given to pt. Pt is awaiting a 1:1 session with the Dietician. · Improve/Maintain quality of life. Maintained at current. · Manage/Control blood pressure. BP remains WNL.       Physician Changes/Comments:      Pulmonary Rehab Staff

## 2020-11-20 ENCOUNTER — OFFICE VISIT (OUTPATIENT)
Dept: CARDIOLOGY | Age: 65
End: 2020-11-20
Payer: MEDICARE

## 2020-11-20 ENCOUNTER — TELEPHONE (OUTPATIENT)
Dept: CARDIOLOGY | Age: 65
End: 2020-11-20

## 2020-11-20 ENCOUNTER — HOSPITAL ENCOUNTER (OUTPATIENT)
Age: 65
Discharge: HOME | End: 2020-11-20
Payer: MEDICARE

## 2020-11-20 VITALS
BODY MASS INDEX: 24.93 KG/M2 | SYSTOLIC BLOOD PRESSURE: 111 MMHG | HEIGHT: 65 IN | RESPIRATION RATE: 17 BRPM | WEIGHT: 149.6 LBS | HEART RATE: 60 BPM | OXYGEN SATURATION: 96 % | DIASTOLIC BLOOD PRESSURE: 68 MMHG

## 2020-11-20 LAB
ANION GAP SERPL CALCULATED.3IONS-SCNC: 9 MMOL/L (ref 9–17)
BNP INTERPRETATION: ABNORMAL
BUN BLDV-MCNC: 11 MG/DL (ref 8–23)
BUN/CREAT BLD: 30 (ref 9–20)
CALCIUM SERPL-MCNC: 8.1 MG/DL (ref 8.6–10.4)
CHLORIDE BLD-SCNC: 102 MMOL/L (ref 98–107)
CO2: 29 MMOL/L (ref 20–31)
CREAT SERPL-MCNC: 0.37 MG/DL (ref 0.5–0.9)
DIGOXIN DATE LAST DOSE: NORMAL
DIGOXIN DOSE AMOUNT: NORMAL
DIGOXIN DOSE TIME: NORMAL
DIGOXIN LEVEL: 1.3 NG/ML (ref 0.5–2)
GFR AFRICAN AMERICAN: >60 ML/MIN
GFR NON-AFRICAN AMERICAN: >60 ML/MIN
GFR SERPL CREATININE-BSD FRML MDRD: ABNORMAL ML/MIN/{1.73_M2}
GFR SERPL CREATININE-BSD FRML MDRD: ABNORMAL ML/MIN/{1.73_M2}
GLUCOSE BLD-MCNC: 82 MG/DL (ref 70–99)
POTASSIUM SERPL-SCNC: 3.7 MMOL/L (ref 3.7–5.3)
PRO-BNP: 318 PG/ML
SODIUM BLD-SCNC: 140 MMOL/L (ref 135–144)

## 2020-11-20 PROCEDURE — 80162 ASSAY OF DIGOXIN TOTAL: CPT

## 2020-11-20 PROCEDURE — 99214 OFFICE O/P EST MOD 30 MIN: CPT | Performed by: INTERNAL MEDICINE

## 2020-11-20 PROCEDURE — 36415 COLL VENOUS BLD VENIPUNCTURE: CPT

## 2020-11-20 PROCEDURE — 80048 BASIC METABOLIC PNL TOTAL CA: CPT

## 2020-11-20 PROCEDURE — 83880 ASSAY OF NATRIURETIC PEPTIDE: CPT

## 2020-11-20 NOTE — TELEPHONE ENCOUNTER
Patient wanted to know if she should do any changes based on her blood work. Informed Dr Susan Ramirez and Dr Susan Ramirez looked over labs and he stated no changes at this time and to continue current treatment. However she should call or go to ER if she develops shortness of breath, chest pain, or any symptoms or if she notices a weight gain. Pt verbalized understanding.

## 2020-11-20 NOTE — PATIENT INSTRUCTIONS
SURVEY:    You may be receiving a survey from [employer] regarding your visit today. Please complete the survey to enable us to provide the highest quality of care to you and your family. If you cannot score us a very good on any question, please call the office to discuss how we could have made your experience a very good one. Thank you.

## 2020-11-20 NOTE — PROGRESS NOTES
Van Hendrickson RN am scribing for and in the presence of Mihai Mortensen MD, F.A.C.C..    Patient: Rhys Olmos  : [birth year]  Date of Admission: (Not on file)  Primary Care Physician: Gregor CHINCHILLA'O Date: 2020    REASON FOR CONSULTATION: 3 Month Follow-Up (Hx:CHF,PAF,HLD,Anemia,PVC's,Dilated Cardiomyopathy, Pulm HTN, Resp Failure. Echo on . Holter on . She has been doing good. Finished Cardio Rehab and started Pulmonary Rehab. Breathing is stable. Lightheadedness with low BP. Denies: CP, Palpitaitons.)    HPI: Ms. Mele Saldana is a 72 y.o. female who presented today for follow-up. Mrs. Mele Saldana initially admitted to [facility] on 2020 because of acute on chronic congestive heart failure. Prior to her admission, she was complaining of worsening shortness of breath and bilateral lower extremity swelling for about 3 months. Patient reported having history of atrial fibrillation. No prior history of myocardial infarction. She denied having family history of premature CAD. She is a longtime smoker, used to smoke 1 pack/day for over 20 years. She is not aware of any history of hypertension, diabetes or dyslipidemia. She used to work at [employer] and retired 2 years ago. She worked for 1 year in [employer] [facility] but recently she is just staying home particularly after the covered 19 pandemic restrictions started. Patient found to have severe cardiomyopathy. Treated initially at [facility] then transferred to [facility] in [place]. She had left and right heart catheterization that showed no significant CAD. Currently treated for nonischemic cardiomyopathy. She was discharged from [facility] on 2020 with home oxygen therapy and LifeVest.    Echo showed her ejection fraction has improved from 25 to 40%, she still has high PVCs burden. Get 24-hour Holter monitor and consider PVCs ablation if needed. LifeVest discontinued on 8/21/2020. She is currently treated for COPD and chronic hypoxemic respiratory failure. Using 1 L of oxygen per minute. She is seeing Dr. Marco Antonio Swift and she is very happy with his care. She is currently doing respiratory rehab after completing her cardiac rehab. Kip Frankshoff Up 10 pounds over the last 3 months-Clinically she does not look fluid overloaded but some mild edema with mild wheezes present. will repeat blood work today to see if she is fluid overloaded. Wt Readings from Last 3 Encounters:   11/20/20 149 lb 9.6 oz (67.9 kg)   11/12/20 148 lb (67.1 kg)   10/19/20 144 lb 14.4 oz (65.7 kg)       Ms. Saida Young  reports she has been doing very well other than she has noticed some weight gain over the last couple of months and has gained about 10 pounds over three months but says that her breathing is stable and she is able to ambulate using her oxygen at 1 L/min. She has continuous oxygen at home but she is currently using portable oxygen via concentrator that is triggered by deep breathing. Denies chest pain, pressure or tightness. She denies any lightheaded/dizziness or palpitations. No fever or cough. No nausea, vomiting or change of bowel habits. No abdominal pain, bowel issues, bleeding problems, problems with medications or any other issues at this time. Past Medical History:   Diagnosis Date    A-fib [facility])     Acute respiratory failure with hypoxia (HCC) from CHF 6/18/2020    CHF (congestive heart failure) (HCC)     COPD (chronic obstructive pulmonary disease) (HCC)     Hypertension        CURRENT ALLERGIES: Patient has no known allergies. REVIEW OF SYSTEMS: 14 systems were reviewed. Pertinent positives and negatives as above, all else negative.      Past Surgical History:   Procedure Laterality Date    CHOLECYSTECTOMY      DILATATION, ESOPHAGUS      GASTRIC BYPASS SURGERY      HERNIA REPAIR      x2    TONSILLECTOMY      Social History:  Social History     Tobacco Use    Smoking status: Former Smoker     Packs/day: 0.25     Years: 20.00     Pack years: 5.00     Types: Cigarettes     Last attempt to quit: 2020     Years since quittin.4    Smokeless tobacco: Never Used   Substance Use Topics    Alcohol use: Not Currently    Drug use: Never        CURRENT MEDICATIONS:  Outpatient Medications Marked as Taking for the 20 encounter (Office Visit) with Jeffery Barney MD   Medication Sig Dispense Refill    losartan (COZAAR) 25 MG tablet Take 0.5 tablets by mouth daily 45 tablet 3    Cholecalciferol (VITAMIN D3) 1.25 MG (87856 UT) CAPS Take 1.25 capsules by mouth once a week      metoprolol tartrate (LOPRESSOR) 25 MG tablet Take 0.5 tablets by mouth 2 times daily 90 tablet 3    pantoprazole (PROTONIX) 40 MG tablet Take 1 tablet by mouth every morning (before breakfast) 90 tablet 3    bumetanide (BUMEX) 1 MG tablet Take 1 tablet by mouth daily 90 tablet 3    potassium chloride (KLOR-CON M) 20 MEQ extended release tablet Take 1 tablet by mouth daily 90 tablet 3    atorvastatin (LIPITOR) 10 MG tablet Take 1 tablet by mouth nightly 90 tablet 3    digoxin (LANOXIN) 250 MCG tablet Take 1 tablet by mouth daily 90 tablet 3    apixaban (ELIQUIS) 5 MG TABS tablet Take 1 tablet by mouth 2 times daily 180 tablet 3    calcium carbonate (OSCAL) 500 MG TABS tablet Take 500 mg by mouth daily      sertraline (ZOLOFT) 50 MG tablet Take 50 mg by mouth daily      aspirin 81 MG chewable tablet Take 1 tablet by mouth daily 30 tablet 0    Multiple Vitamins-Minerals (MULTIVITAMIN ADULT) CHEW Take 1 tablet by mouth daily            FAMILY HISTORY: No family history of premature CAD. PHYSICAL EXAM:   /68 (Site: Left Upper Arm, Position: Sitting, Cuff Size: Medium Adult)   Pulse 60   Resp 17   Ht 5' 5\" (1.651 m)   Wt 149 lb 9.6 oz (67.9 kg)   SpO2 96%   BMI 24.89 kg/m²  Body mass index is 24.89 kg/m². Constitutional: She is oriented to person, place, and time.  She appears well-developed and well-nourished. In no acute distress. HEENT: Normocephalic and atraumatic. No JVD present. Carotid bruit is not present. No mass and no thyromegaly present. No lymphadenopathy present. Cardiovascular: Normal rate, regularly irregular rhythm, normal heart sounds. Exam reveals no gallop and no friction rubs. No murmur was heard. .   Pulmonary/Chest: Wheezes present. Severe kyphoscoliosis noted. No significant crackles or rhonchi. Using oxygen via nasal cannula at 2 L/min. Abdominal: Soft, non-tender. Bowel sounds and aorta are normal. She exhibits no organomegaly, mass or bruit. Extremities: Mild bilateral ankle edema. No cyanosis or clubbing. 2+ radial and carotid pulses. Distal extremity pulses: 2+ bilaterally. Neurological: She is alert and oriented to person, place, and time. No evidence of gross cranial nerve deficit. Coordination appeared normal.   Skin: Skin is warm and dry. There is no rash or diaphoresis. Psychiatric: She has a normal mood and affect.  Her speech is normal and behavior is normal.      MOST RECENT LABS ON RECORD:   Lab Results   Component Value Date    WBC 7.3 10/22/2020    HGB 10.7 (L) 10/22/2020    HCT 36.6 10/22/2020     10/22/2020    CHOL 129 10/22/2020    TRIG 97 10/22/2020    HDL 50 10/22/2020    ALT 12 10/22/2020    AST 18 10/22/2020     10/22/2020    K 3.7 10/22/2020     10/22/2020    CREATININE 0.36 (L) 10/22/2020    BUN 14 10/22/2020    CO2 32 (H) 10/22/2020    TSH 0.75 06/16/2020    INR 1.04 06/23/2020       ASSESSMENT:  Patient Active Problem List    Diagnosis Date Noted    Acute systolic CHF (congestive heart failure), NYHA class 3 (HCC)      Priority: High    Acute right heart failure (HCC)      Priority: High    COPD exacerbation (HCC)      Priority: High    Tobacco abuse      Priority: High    Hypotension      Priority: High    S/P cardiac cath      Priority: High    Nonischemic cardiomyopathy [facility]      Priority: High  COPD, severe ([facility]     Acute respiratory failure with hypoxia (HCC)     Cardiomyopathy ([facility]     COPD with exacerbation (HCC)     Pleural effusion, bilateral     Arrhythmia     Bilateral leg edema     Pulmonary hypertension (HCC)     Moderate tricuspid regurgitation     Moderate mitral regurgitation     Physical deconditioning     Acute combined systolic and diastolic congestive heart failure, NYHA class 4 ([facility] 06/19/2020    CHF (congestive heart failure) ([facility]     Anasarca 06/17/2020    Acute pulmonary embolism ([facility] ? / Normal BLE Venous Ultrasound 2020 / Suspect Radiology Overread 06/17/2020      Diagnosis Orders   1. Chronic combined systolic and diastolic congestive heart failure, NYHA class 2 (HCC)  Brain Natriuretic Peptide    Basic Metabolic Panel    Echo 2D w doppler w color complete    Digoxin Level   2. PAF (paroxysmal atrial fibrillation) ([facility]     3. Moderate mitral regurgitation     4. Mixed hyperlipidemia     5. Chronic hypoxemic respiratory failure (HCC)     6. NICM (nonischemic cardiomyopathy) ([facility]         PLAN:     Chronic combined heart failure: New York Heart Association Class: IIb (Marked symptoms during daily activities)-Up 10 pounds over the last 3 months-Clinically she does not look fluid overloaded but some mild edema with mild wheezes present. will repeat blood work today to see if she is fluid overloaded.  Beta Blocker: Continue Metoprolol tartrate (Lopressor) 25 mg 1/2 tab bid.  ACE Inibitor/ARB: Continue Losartan 12.5 mg daily.  Continue digoxin 250 mcg daily.  Diuretics: Continue bumetinide (Bumex) 1 mg every morning.  Heart failure counseling: I told them to continue wearing lower extremity compression stockings and I advised them to try and keep their legs up whenever possible and to limit salt in their diet.  Patient Education/Instructions: I did have my heart failure nurse [facility] spend about 10 minutes with Ms. Marizol More going over her heart failure packet including dietary guidelines, the signs and symptoms to watch for including shortness of breath, weight gain, lightheadedness/dizziness. I asked her to call the office if she were to develop persistent or worsening shortness of breath or weight gain of more than 3 pounds in 1-7 days. Ms. Juliana Frankel verbalized understanding.  Additional Testing: I Ordered an Echocardiogram to assess Ms. Roth's ejection fraction and to look for significant valvular heart disease as a source of Ms. Roth symptoms   Laboratory testing:    Basic metabolic panel (BMP) for now to assess her potassium and renal function.  BNP ordered to assess fluid status   Digoxin level for now to assess digoxin level. · Paroxysmal Atrial Fibrillation: Currently maintaining sinus rhythm with very frequent unifocal PVCs.  Beta Blocker: Continue metoprolol 25 mg half tablet twice a day.  Continue digoxin 250 mcg daily.  Anti-Arrhythmic: Not indicated.  Stroke Risk: CHADS2-VASc Score: 3/9 (3.2% stroke risk)  o [id number] Score for Atrial Fibrillation Stroke Risk  o  o Risk   o Factors o  o Component Value   o C o CHF Yes 1   o H o HTN No 0   o A2 o Age >= 76 o No,  o (66 y.o.) 0   o D o DM No 0   o S2 o Prior Stroke/TIA No 0   o V o Vascular Disease No 0   o A o Age 74-69 o Yes,  o (66 y.o.) 1   o Sc o Sex female 1   o  o [id number]  o Score o  3   o Score last updated 7/8/20 63:86 PM EDT    o Click here for a link to the UpToDate guideline \"Atrial Fibrillation: Anticoagulation therapy to prevent embolization  o Disclaimer: Risk Score calculation is dependent on accuracy of patient problem list and past encounter diagnosis.  Anticoagulation: Continue Apixaban (Eliquis) 5 mg every 12 hours. · Moderate mitral and tricuspid regurgitation, moderate pulmonary hypertension:I did explain to her that these are functional valve abnormalities.   They can get better if heart function and cardiac size improved   · Beta Blocker: Continue metoprolol as above. · ACE Inibitor/ARB: Continue Losartan 12.5 mg daily. · Diuretics: Continue Bumex 1 mg daily. · I advised them to try and keep their legs up whenever possible and to limit salt in their diet. · Counseled regarding use of compression stockings. · Follow-up repeat echo as above. · Hyperlipidemia: Mixed  · Cholesterol Reduction Therapy: Continue Atorvastatin (Lipitor) 10 mg daily. · COPD, chronic hypoxemic respiratory failure:   · Continue to follow up with Dr. Peterson Poles    · Continue supplemental oxygen      Once again, thank you for allowing me to participate in this patients care. Please do not hesitate to contact me could I be of further assistance. Sincerely,  Memo Pathak MD, MS, F.A.C.C. [facility], [address] [place] [address]  Phone: [phone], Fax: [phone]    Based on the patients current medical conditions, I believe the risk of significant morbidity and mortality is: high    The documentation recorded by the scribe, accurately and completely reflects the services I personally performed and the decisions made by me. Memo Pathak MD, F.A.C.C.  November 20, 2020

## 2020-11-23 ENCOUNTER — HOSPITAL ENCOUNTER (OUTPATIENT)
Dept: CARDIAC REHAB | Age: 65
Setting detail: THERAPIES SERIES
Discharge: HOME | End: 2020-11-23
Payer: MEDICARE

## 2020-11-23 PROCEDURE — G0424 PULMONARY REHAB W EXER: HCPCS

## 2020-11-25 ENCOUNTER — APPOINTMENT (OUTPATIENT)
Dept: CARDIAC REHAB | Age: 65
End: 2020-11-25
Payer: MEDICARE

## 2020-11-26 ENCOUNTER — APPOINTMENT (OUTPATIENT)
Dept: CARDIAC REHAB | Age: 65
End: 2020-11-26
Payer: MEDICARE

## 2020-11-30 ENCOUNTER — HOSPITAL ENCOUNTER (OUTPATIENT)
Dept: CARDIAC REHAB | Age: 65
Setting detail: THERAPIES SERIES
Discharge: HOME | End: 2020-11-30
Payer: MEDICARE

## 2020-11-30 PROCEDURE — G0424 PULMONARY REHAB W EXER: HCPCS

## 2020-12-02 ENCOUNTER — HOSPITAL ENCOUNTER (OUTPATIENT)
Dept: NON INVASIVE DIAGNOSTICS | Age: 65
Discharge: HOME | End: 2020-12-02
Payer: MEDICARE

## 2020-12-02 ENCOUNTER — HOSPITAL ENCOUNTER (OUTPATIENT)
Dept: CARDIAC REHAB | Age: 65
Setting detail: THERAPIES SERIES
Discharge: HOME | End: 2020-12-02
Payer: MEDICARE

## 2020-12-02 LAB
LV EF: 55 %
LVEF MODALITY: NORMAL

## 2020-12-02 PROCEDURE — 93306 TTE W/DOPPLER COMPLETE: CPT

## 2020-12-02 PROCEDURE — G0424 PULMONARY REHAB W EXER: HCPCS

## 2020-12-03 ENCOUNTER — TELEPHONE (OUTPATIENT)
Dept: CARDIOLOGY | Age: 65
End: 2020-12-03

## 2020-12-03 NOTE — TELEPHONE ENCOUNTER
----- Message from Nikolai Angeles MD sent at 12/2/2020  8:26 PM EST -----  Heart function is better. It is now back to normal.  Continue current therapy and follow-up. Please call with questions and/or concerns.   Thank you

## 2020-12-04 ENCOUNTER — TELEPHONE (OUTPATIENT)
Dept: CARDIOLOGY | Age: 65
End: 2020-12-04

## 2020-12-04 NOTE — TELEPHONE ENCOUNTER
Called patient to see how she is feeling and doing including weight, BP, HR, and symptoms but no answer. LVM for her to call office back.

## 2020-12-07 ENCOUNTER — TELEPHONE (OUTPATIENT)
Dept: CARDIOLOGY | Age: 65
End: 2020-12-07

## 2020-12-09 ENCOUNTER — HOSPITAL ENCOUNTER (OUTPATIENT)
Dept: CARDIAC REHAB | Age: 65
Setting detail: THERAPIES SERIES
End: 2020-12-09
Payer: MEDICARE

## 2020-12-10 ENCOUNTER — APPOINTMENT (OUTPATIENT)
Dept: CARDIAC REHAB | Age: 65
End: 2020-12-10
Payer: MEDICARE

## 2020-12-11 ENCOUNTER — TELEPHONE (OUTPATIENT)
Dept: CARDIOLOGY | Age: 65
End: 2020-12-11

## 2020-12-11 NOTE — TELEPHONE ENCOUNTER
Pt reports feeling good. Weight :147-148 pounds-stable    Denies Palpitations, dizziness, lightheadedness, sob, cp.       BP:115/66  HR:71    Medications compliance-yes    Taking a break from pulmonary rehab because of the pandemic but does her exercises at home. Confirmed appt in Feb 2021    Please advise.  Thanks so much

## 2020-12-11 NOTE — TELEPHONE ENCOUNTER
Heart failure follow up call: Pt reports feeling good.      Weight :147-148 pounds-stable     Denies Palpitations, dizziness, lightheadedness, sob, cp.         BP:115/66  HR:71     Medications compliance-yes     Taking a break from pulmonary rehab because of the pandemic but does her exercises at home.     Confirmed appt in Feb 2021     Please advise.  Thanks so much

## 2020-12-14 ENCOUNTER — APPOINTMENT (OUTPATIENT)
Dept: CARDIAC REHAB | Age: 65
End: 2020-12-14
Payer: MEDICARE

## 2020-12-16 ENCOUNTER — APPOINTMENT (OUTPATIENT)
Dept: CARDIAC REHAB | Age: 65
End: 2020-12-16
Payer: MEDICARE

## 2020-12-17 ENCOUNTER — APPOINTMENT (OUTPATIENT)
Dept: CARDIAC REHAB | Age: 65
End: 2020-12-17
Payer: MEDICARE

## 2020-12-21 ENCOUNTER — HOSPITAL ENCOUNTER (OUTPATIENT)
Dept: CARDIAC REHAB | Age: 65
Setting detail: THERAPIES SERIES
End: 2020-12-21
Payer: MEDICARE

## 2020-12-23 ENCOUNTER — APPOINTMENT (OUTPATIENT)
Dept: CARDIAC REHAB | Age: 65
End: 2020-12-23
Payer: MEDICARE

## 2020-12-24 ENCOUNTER — APPOINTMENT (OUTPATIENT)
Dept: CARDIAC REHAB | Age: 65
End: 2020-12-24
Payer: MEDICARE

## 2020-12-28 ENCOUNTER — APPOINTMENT (OUTPATIENT)
Dept: CARDIAC REHAB | Age: 65
End: 2020-12-28
Payer: MEDICARE

## 2020-12-30 ENCOUNTER — APPOINTMENT (OUTPATIENT)
Dept: CARDIAC REHAB | Age: 65
End: 2020-12-30
Payer: MEDICARE

## 2020-12-31 ENCOUNTER — APPOINTMENT (OUTPATIENT)
Dept: CARDIAC REHAB | Age: 65
End: 2020-12-31
Payer: MEDICARE

## 2021-02-24 ENCOUNTER — HOSPITAL ENCOUNTER (OUTPATIENT)
Age: 66
Discharge: HOME | End: 2021-02-24
Payer: MEDICARE

## 2021-02-24 ENCOUNTER — OFFICE VISIT (OUTPATIENT)
Dept: CARDIOLOGY | Age: 66
End: 2021-02-24
Payer: MEDICARE

## 2021-02-24 VITALS
DIASTOLIC BLOOD PRESSURE: 68 MMHG | OXYGEN SATURATION: 96 % | HEART RATE: 58 BPM | WEIGHT: 158 LBS | BODY MASS INDEX: 26.33 KG/M2 | SYSTOLIC BLOOD PRESSURE: 112 MMHG | RESPIRATION RATE: 16 BRPM | HEIGHT: 65 IN

## 2021-02-24 DIAGNOSIS — J44.9 CHRONIC OBSTRUCTIVE PULMONARY DISEASE, UNSPECIFIED COPD TYPE (HCC): ICD-10-CM

## 2021-02-24 DIAGNOSIS — I34.0 MODERATE MITRAL REGURGITATION: ICD-10-CM

## 2021-02-24 DIAGNOSIS — I48.0 PAF (PAROXYSMAL ATRIAL FIBRILLATION) (HCC): ICD-10-CM

## 2021-02-24 DIAGNOSIS — I50.42 CHRONIC COMBINED SYSTOLIC (CONGESTIVE) AND DIASTOLIC (CONGESTIVE) HEART FAILURE (HCC): Primary | ICD-10-CM

## 2021-02-24 DIAGNOSIS — I07.1 MODERATE TRICUSPID REGURGITATION: ICD-10-CM

## 2021-02-24 DIAGNOSIS — I50.42 CHRONIC COMBINED SYSTOLIC (CONGESTIVE) AND DIASTOLIC (CONGESTIVE) HEART FAILURE (HCC): ICD-10-CM

## 2021-02-24 DIAGNOSIS — E78.2 MIXED HYPERLIPIDEMIA: ICD-10-CM

## 2021-02-24 LAB
ANION GAP SERPL CALCULATED.3IONS-SCNC: 9 MMOL/L (ref 9–17)
BUN BLDV-MCNC: 8 MG/DL (ref 8–23)
BUN/CREAT BLD: 21 (ref 9–20)
CALCIUM SERPL-MCNC: 8.5 MG/DL (ref 8.6–10.4)
CHLORIDE BLD-SCNC: 100 MMOL/L (ref 98–107)
CO2: 31 MMOL/L (ref 20–31)
CREAT SERPL-MCNC: 0.38 MG/DL (ref 0.5–0.9)
DIGOXIN DATE LAST DOSE: NORMAL
DIGOXIN DOSE AMOUNT: NORMAL
DIGOXIN DOSE TIME: NORMAL
DIGOXIN LEVEL: 1 NG/ML (ref 0.5–2)
GFR AFRICAN AMERICAN: >60 ML/MIN
GFR NON-AFRICAN AMERICAN: >60 ML/MIN
GFR SERPL CREATININE-BSD FRML MDRD: ABNORMAL ML/MIN/{1.73_M2}
GFR SERPL CREATININE-BSD FRML MDRD: ABNORMAL ML/MIN/{1.73_M2}
GLUCOSE BLD-MCNC: 86 MG/DL (ref 70–99)
HCT VFR BLD CALC: 39 % (ref 36.3–47.1)
HEMOGLOBIN: 11.2 G/DL (ref 11.9–15.1)
MCH RBC QN AUTO: 24.8 PG (ref 25.2–33.5)
MCHC RBC AUTO-ENTMCNC: 28.7 G/DL (ref 28.4–34.8)
MCV RBC AUTO: 86.5 FL (ref 82.6–102.9)
NRBC AUTOMATED: 0 PER 100 WBC
PDW BLD-RTO: 14.8 % (ref 11.8–14.4)
PLATELET # BLD: 356 K/UL (ref 138–453)
PMV BLD AUTO: 10.7 FL (ref 8.1–13.5)
POTASSIUM SERPL-SCNC: 4.3 MMOL/L (ref 3.7–5.3)
RBC # BLD: 4.51 M/UL (ref 3.95–5.11)
SODIUM BLD-SCNC: 140 MMOL/L (ref 135–144)
WBC # BLD: 6.8 K/UL (ref 3.5–11.3)

## 2021-02-24 PROCEDURE — 80162 ASSAY OF DIGOXIN TOTAL: CPT

## 2021-02-24 PROCEDURE — 85027 COMPLETE CBC AUTOMATED: CPT

## 2021-02-24 PROCEDURE — 99214 OFFICE O/P EST MOD 30 MIN: CPT | Performed by: INTERNAL MEDICINE

## 2021-02-24 PROCEDURE — 36415 COLL VENOUS BLD VENIPUNCTURE: CPT

## 2021-02-24 PROCEDURE — 80048 BASIC METABOLIC PNL TOTAL CA: CPT

## 2021-02-24 NOTE — PATIENT INSTRUCTIONS
SURVEY:    You may be receiving a survey from [employer] regarding your visit today. Please complete the survey to enable us to provide the highest quality of care to you and your family. If you cannot score us a very good on any question, please call the office to discuss how we could have made your experience a very good one. Thank you.     [address] [facility] :)

## 2021-02-24 NOTE — PROGRESS NOTES
Katie Parker RN am scribing for and in the presence of Shara Finley MD, F.A.C.C..    Patient: Sharri Humphreys  :   Date of Admission: (Not on file)  Primary Care Physician: Kymberly Parnell  Today's Date: 2021    REASON FOR CONSULTATION: Follow-up (Pt does report some shortness of breath but only when exercising on the treadmill (mild). She says she is feeling great and denies any CP, lightheadedness, dizziness, palpitations. )    HPI: Ms. Kevin Shelley is a 72 y.o. female who presented today for follow-up. Mrs. Kevin Shelley initially admitted to [id number] [place] [address] on 2020 because of acute on chronic congestive heart failure. Prior to her admission, she was complaining of worsening shortness of breath and bilateral lower extremity swelling for about 3 months. Patient reported having history of atrial fibrillation. No prior history of myocardial infarction. She denied having family history of premature CAD. She is a longtime smoker, used to smoke 1 pack/day for over 20 years. She is not aware of any history of hypertension, diabetes or dyslipidemia. She used to work at [employer] and retired 2 years ago. She worked for 1 year in [email or web address]  but recently she is just staying home particularly after the covered 19 pandemic restrictions started. Patient found to have severe cardiomyopathy. Treated initially at [facility] then transferred to [facility] in [place]. She had left and right heart catheterization that showed no significant CAD. Currently treated for nonischemic cardiomyopathy. She was discharged from [facility] on 2020 with home oxygen therapy and LifeVest.    Echo showed her ejection fraction has improved from 25 to 40%, she still has high PVCs burden. Get 24-hour Holter monitor and consider PVCs ablation if needed. LifeVest discontinued on 2020.     Echocardiogram in 2020  Showed her ejection fraction improved from 40% to 55%. She is currently treated for COPD and chronic hypoxemic respiratory failure. Using 1 L of oxygen per minute. She is seeing Dr. Brunilda George and she is very happy with his care. She is currently doing respiratory rehab after completing her cardiac rehab. .    Increased 10 pounds since last visit but I believe this is her baseline dry weight based on her symptoms and breathing and she clinically looks dry    Wt Readings from Last 3 Encounters:   02/24/21 158 lb (71.7 kg)   11/20/20 149 lb 9.6 oz (67.9 kg)   11/12/20 148 lb (67.1 kg)       Ms. Abraham Schmitt reports doing fairly well since last visit and says she feels better than what she has before. She has continuous oxygen at home but she is currently using portable oxygen via concentrator that is triggered by deep breathing. Ms. Abraham Schmitt does say she goes to see Dr. Brunilda George in May 2021 for a repeat walk test to see if she will need oxygen still. She denies chest pain, pressure or tightness. She denies any lightheaded/dizziness or palpitations. No fever or cough. No nausea, vomiting or change of bowel habits. No abdominal pain, bowel issues, bleeding problems, problems with medications or any other issues at this time. Past Medical History:   Diagnosis Date    A-fib [facility])     Acute respiratory failure with hypoxia (HCC) from CHF 6/18/2020    CHF (congestive heart failure) (HCC)     COPD (chronic obstructive pulmonary disease) (HCC)     Hypertension        CURRENT ALLERGIES: Patient has no known allergies. REVIEW OF SYSTEMS: 14 systems were reviewed. Pertinent positives and negatives as above, all else negative.      Past Surgical History:   Procedure Laterality Date    CHOLECYSTECTOMY      DILATATION, ESOPHAGUS      GASTRIC BYPASS SURGERY      HERNIA REPAIR      x2    TONSILLECTOMY      Social History:  Social History     Tobacco Use    Smoking status: Former Smoker     Packs/day: 0.25     Years: 20.00     Pack years: 5.00     Types: Cigarettes     Quit date: 2020     Years since quittin.6    Smokeless tobacco: Never Used   Substance Use Topics    Alcohol use: Not Currently    Drug use: Never        CURRENT MEDICATIONS:  No outpatient medications have been marked as taking for the 21 encounter (Office Visit) with Alfreda Quiros MD.         FAMILY HISTORY: No family history of premature CAD. PHYSICAL EXAM:   Ht 5' 5\" (1.651 m)   Wt 158 lb (71.7 kg)   BMI 26.29 kg/m²  Body mass index is 26.29 kg/m². Constitutional: She is oriented to person, place, and time. She appears well-developed and well-nourished. In no acute distress. HEENT: Normocephalic and atraumatic. No JVD present. Carotid bruit is not present. No mass and no thyromegaly present. No lymphadenopathy present. Cardiovascular: Normal rate, regularly irregular rhythm, normal heart sounds. Exam reveals no gallop and no friction rubs. No murmur was heard. .   Pulmonary/Chest: Poor air entry bilaterally. . Severe kyphoscoliosis noted. No significant crackles or rhonchi. Using oxygen via nasal cannula at 2 L/min. Abdominal: Soft, non-tender. Bowel sounds and aorta are normal. She exhibits no organomegaly, mass or bruit. Extremities: Mild bilateral ankle edema. No cyanosis or clubbing. 2+ radial and carotid pulses. Distal extremity pulses: 2+ bilaterally. Neurological: She is alert and oriented to person, place, and time. No evidence of gross cranial nerve deficit. Coordination appeared normal.   Skin: Skin is warm and dry. There is no rash or diaphoresis. Psychiatric: She has a normal mood and affect.  Her speech is normal and behavior is normal.      MOST RECENT LABS ON RECORD:   Lab Results   Component Value Date    WBC 7.3 10/22/2020    HGB 10.7 (L) 10/22/2020    HCT 36.6 10/22/2020     10/22/2020    CHOL 129 10/22/2020    TRIG 97 10/22/2020    HDL 50 10/22/2020    ALT 12 10/22/2020    AST 18 10/22/2020     2020    K 3.7 them to try and keep their legs up whenever possible and to limit salt in their diet.  Laboratory testing:    Basic metabolic panel (BMP) for now to assess her potassium and renal function.  BNP ordered to assess fluid status   Digoxin level for now to assess digoxin level. · Paroxysmal Atrial Fibrillation: Currently maintaining sinus rhythm with very frequent unifocal PVCs.  Beta Blocker: Continue metoprolol 25 mg half tablet twice a day.  Continue digoxin 250 mcg daily.  Anti-Arrhythmic: Not indicated.  Stroke Risk: CHADS2-VASc Score: 3/9 (3.2% stroke risk)  o [id number] Score for Atrial Fibrillation Stroke Risk   Risk   Factors  Component Value   C CHF Yes 1   H HTN No 0   A2 Age >= 75 No,  (66 y.o.) 0   D DM No 0   S2 Prior Stroke/TIA No 0   V Vascular Disease No 0   A Age 74-69 Yes,  (66 y.o.) 1   Sc Sex female 1    [id number]  Score  3   o Score last updated 7/8/20 26:63 PM EDT    o Click here for a link to the UpToDate guideline \"Atrial Fibrillation: Anticoagulation therapy to prevent embolization  o Disclaimer: Risk Score calculation is dependent on accuracy of patient problem list and past encounter diagnosis.  Anticoagulation: Continue Apixaban (Eliquis) 5 mg every 12 hours. · Mild tricuspid regurgitation, moderate pulmonary hypertension:  · Beta Blocker: Continue metoprolol as above. · ACE Inibitor/ARB: Continue Losartan 12.5 mg daily. · Diuretics: Continue Bumex 1 mg daily. · I advised them to try and keep their legs up whenever possible and to limit salt in their diet. · Counseled regarding use of compression stockings. · Hyperlipidemia: Mixed  · Cholesterol Reduction Therapy: Continue Atorvastatin (Lipitor) 10 mg daily. · COPD, chronic hypoxemic respiratory failure:   · Continue to follow up with Dr. Claus Rivas    · Continue supplemental oxygen      Follow up:   I told Ms. Vasu Christine to call my office if she had any problems, but otherwise told her to Return in about 6 months (around 8/24/2021). However, I would be happy to see her sooner should the need arise. Sincerely,  Nabil Strauss MD, MS, F.A.C.C. [address] [place] [address] [facility], [address] [place] [address]  Phone: [phone], Fax: [phone]    Based on the patients current medical conditions, I believe the risk of significant morbidity and mortality is: Intermediate to high. The documentation recorded by the scribe, accurately and completely reflects the services I personally performed and the decisions made by me. Nabil Strauss MD, F.A.C.C.  February 24, 2021

## 2021-02-25 ENCOUNTER — TELEPHONE (OUTPATIENT)
Dept: CARDIOLOGY | Age: 66
End: 2021-02-25

## 2021-02-25 NOTE — TELEPHONE ENCOUNTER
----- Message from Khanh Varma MD sent at 2/24/2021  9:08 PM EST -----  Blood work is good. Continue current therapy and follow-up. Please call with questions and/or concerns.   Thank you

## 2021-04-26 NOTE — PROGRESS NOTES
Subjective:      Patient ID: Ruby Trejo is a 77 y.o. female. HPI  Here for follow-up for stage III COPD. Patient was last seen in the office in November 2020 per Dr. Terrell Pineda. Up to date with Covid vaccines. Patient is doing well, wishes that she could be off of oxygen. Discussed with patient that as long as her oxygen saturation is 88% or higher she can remove her oxygen for short periods of time during the day but that she would benefit from having it on with exertion and while she is sleeping. She denies any significant change in her shortness of breath notes that it is mostly associated with exertion. Denies any cough, sputum production. Denies any purulent sputum or hemoptysis. Denies any unintentional weight loss actually states that she has gained weight. Lengthy discussion with patient to closely watch her weight is increased weight gain will likely make her shortness of breath worse. She is up-to-date with her Covid vaccines. She is having difficulty with affording prescriptions, working with her pharmacist and her primary care doctor to find lower cost alternatives for her Eliquis. Discussed using good Rx card and checking on cash price at other pharmacies to see if it would be cheaper elsewhere in addition to checking for alternative agents in the same drug class with her pharmacist.      Medications:   Stiolto 2.5-2.5 mcg: Albuterol HFA:    PRIOR WORKUP:  PFT:  6-minute walk 9/23/2020: SPO2 on room air at rest was 93%. Patient walked for 1 minute on room air SPO2 desaturated to 79 to 88% during walking on room air. Oxygen was applied at 2 L per nasal cannula and her SPO2 remained 94 to 96% at rest and 90 to 94% with ambulation. Patient did qualify for home O2. PFT 8/27/2020: FEV1 28% of predicted with a positive postbronchodilator change to 39% of predicted. FVC 57% of predicted with a positive bronchodilator change to 62% of predicted. FEV1/FVC ratio was 38.   Lung volumes by body box show RV of 139% of predicted, TLC 94% of predicted. Diffusion capacity severely reduced when corrected for alveolar volume is 44% of predicted. Normal airway resistance. Final impression: Stage III COPD with bronchodilator response that does not meet ATS criteria. Severely reduced diffusion capacity due to underlying emphysema, interstitial disease or anemia. Echocardiogram 12/2/2020: LVEF 55%. Mild tricuspid regurgitation, moderate pulmonary hypertension with an estimated RVSP 43 mmHg. Grade 1 diastolic dysfunction is seen. CT Imaging:  CT chest 6/16/2020: Subtle filling defect of the right upper lobe subsegmental branch pulmonary embolism suspected with no central or segmental pulmonary embolism apparent. Trace bilateral pleural effusions with associated bibasilar airspace disease. Prominence of pulmonary interstitium. Vague groundglass opacities. Findings are compatible with volume overload. Scattered areas of atelectasis. Sleep Study:    Laboratory Evaluation:    Immunizations:   Immunization History   Administered Date(s) Administered    COVID-19, Moderna, PF, 100mcg/0.5mL 02/26/2021, 03/26/2021    Influenza, High Dose (Fluzone 65 yrs and older) 09/11/2020    Pneumococcal Polysaccharide ([id number]) 10/12/2020        No flowsheet data found.     /60   Pulse 71   Temp 98.8 °F (37.1 °C)   Resp 20   Ht 5' 5\" (1.651 m)   Wt 164 lb 14.4 oz (74.8 kg)   SpO2 96%   BMI 27.44 kg/m²     Past Medical History:   Diagnosis Date    A-fib ([facility]     Acute respiratory failure with hypoxia (HCC) from CHF 6/18/2020    CHF (congestive heart failure) (HCC)     COPD (chronic obstructive pulmonary disease) (HCC)     Hypertension      Past Surgical History:   Procedure Laterality Date    CHOLECYSTECTOMY      DILATATION, ESOPHAGUS      GASTRIC BYPASS SURGERY      HERNIA REPAIR      x2    TONSILLECTOMY       Family History   Adopted: Yes   Problem Relation Age of Onset    Heart Disease Mother        Social History     Socioeconomic History    Marital status:      Spouse name: Not on file    Number of children: 3    Years of education: Not on file    Highest education level: Not on file   Occupational History    Not on file   Social Needs    Financial resource strain: Not on file    Food insecurity     Worry: Not on file     Inability: Not on file    Transportation needs     Medical: Not on file     Non-medical: Not on file   Tobacco Use    Smoking status: Former Smoker     Packs/day: 0.25     Years: 20.00     Pack years: 5.00     Types: Cigarettes     Quit date: 2020     Years since quittin.9    Smokeless tobacco: Never Used   Substance and Sexual Activity    Alcohol use: Not Currently    Drug use: Never    Sexual activity: Not on file   Lifestyle    Physical activity     Days per week: Not on file     Minutes per session: Not on file    Stress: Not on file   Relationships    Social connections     Talks on phone: Not on file     Gets together: Not on file     Attends Ca[employer]ism service: Not on file     Active member of club or organization: Not on file     Attends meetings of clubs or organizations: Not on file     Relationship status: Not on file    Intimate partner violence     Fear of current or ex partner: Not on file     Emotionally abused: Not on file     Physically abused: Not on file     Forced sexual activity: Not on file   Other Topics Concern    Not on file   Social History Narrative    Not on file       Review of Systems   Constitutional:        Decreased activity tolerance   HENT: Negative. Eyes: Negative. Respiratory:        Exertional dyspnea denies any cough, sputum production, purulent sputum or hemoptysis. Cardiovascular: Negative. Gastrointestinal: Negative. Endocrine: Negative. Genitourinary: Negative. Musculoskeletal: Negative. Skin: Negative. Allergic/Immunologic: Negative. Neurological: Negative. Hematological: Negative. Psychiatric/Behavioral: Negative. Objective:       Physical Exam  General appearance - alert, well appearing, and in no distress, oriented to person, place, and time and normal appearing weight  Mental status - alert, oriented to person, place, and time, normal mood, behavior, speech, dress, motor activity, and thought processes  Eyes - pupils equal and reactive, extraocular eye movements intact  Ears - not examined  Nose - normal and patent, no erythema, discharge or polyps  Mouth - mucous membranes moist, pharynx normal without lesions  Neck - supple, no significant adenopathy  Chest -increased AP diameter, decreased thoracic expansion and excursion, prolonged expiratory phase. No adventitious sounds appreciated. Lung sounds generally diminished throughout. No witnessed cough.   Heart -normal rate, regular rhythm, normal S1, S2, no murmurs, rubs, clicks or gallops  Abdomen - soft, nontender, nondistended, no masses or organomegaly  Neuro- alert, oriented, normal speech, no focal findings or movement disorder noted}  Extremities - peripheral pulses normal, no pedal edema, no clubbing or cyanosis  Skin - normal coloration and turgor, no rashes, no suspicious skin lesions noted     Wt Readings from Last 3 Encounters:   05/13/21 164 lb 14.4 oz (74.8 kg)   02/24/21 158 lb (71.7 kg)   11/20/20 149 lb 9.6 oz (67.9 kg)       Results for orders placed or performed during the hospital encounter of 02/24/21   CBC   Result Value Ref Range    WBC 6.8 3.5 - 11.3 k/uL    RBC 4.51 3.95 - 5.11 m/uL    Hemoglobin 11.2 (L) 11.9 - 15.1 g/dL    Hematocrit 39.0 36.3 - 47.1 %    MCV 86.5 82.6 - 102.9 fL    MCH 24.8 (L) 25.2 - 33.5 pg    MCHC 28.7 28.4 - 34.8 g/dL    RDW 14.8 (H) 11.8 - 14.4 %    Platelets 060 430 - 555 k/uL    MPV 10.7 8.1 - 13.5 fL    NRBC Automated 0.0 0.0 per 100 WBC   Basic Metabolic Panel   Result Value Ref Range    Glucose 86 70 - 99 mg/dL    BUN 8 8 - 23 mg/dL    CREATININE discussed:    Significance of positive screen - False-positive LDCT results often occur. 95% of all positive results do not lead to a diagnosis of cancer. Usually further imaging can resolve most false-positive results; however, some patients may require invasive procedures. Over diagnosis risk - 10% to 12% of screen-detected lung cancer cases are over diagnosed--that is, the cancer would not have been detected in the patient's lifetime without the screening. Need for follow up screens annually to continue lung cancer screening effectiveness     Risks associated with radiation from annual LDCT- Radiation exposure is about the same as for a mammogram, which is about 1/3 of the annual background radiation exposure from everyday life. Starting screening at age 54 is not likely to increase cancer risk from radiation exposure. Patients with comorbidities resulting in life expectancy of < 10 years, or that would preclude treatment of an abnormality identified on CT, should not be screened due to lack of benefit.     To obtain maximal benefit from this screening, smoking cessation and long-term abstinence from smoking is critical

## 2021-05-12 DIAGNOSIS — I48.0 PAF (PAROXYSMAL ATRIAL FIBRILLATION) (HCC): Primary | ICD-10-CM

## 2021-05-13 ENCOUNTER — OFFICE VISIT (OUTPATIENT)
Dept: PULMONOLOGY | Age: 66
End: 2021-05-13
Payer: MEDICARE

## 2021-05-13 VITALS
TEMPERATURE: 98.8 F | WEIGHT: 164.9 LBS | RESPIRATION RATE: 20 BRPM | HEIGHT: 65 IN | OXYGEN SATURATION: 96 % | BODY MASS INDEX: 27.47 KG/M2 | HEART RATE: 71 BPM | SYSTOLIC BLOOD PRESSURE: 101 MMHG | DIASTOLIC BLOOD PRESSURE: 60 MMHG

## 2021-05-13 DIAGNOSIS — I48.0 PAROXYSMAL ATRIAL FIBRILLATION (HCC): ICD-10-CM

## 2021-05-13 DIAGNOSIS — J44.9 STAGE 3 SEVERE COPD BY GOLD CLASSIFICATION (HCC): Primary | ICD-10-CM

## 2021-05-13 DIAGNOSIS — Z87.891 STOPPED SMOKING WITH GREATER THAN 30 PACK YEAR HISTORY: ICD-10-CM

## 2021-05-13 DIAGNOSIS — Z87.891 PERSONAL HISTORY OF TOBACCO USE: ICD-10-CM

## 2021-05-13 DIAGNOSIS — I42.8 NONISCHEMIC CARDIOMYOPATHY (HCC): ICD-10-CM

## 2021-05-13 PROCEDURE — 99214 OFFICE O/P EST MOD 30 MIN: CPT | Performed by: NURSE PRACTITIONER

## 2021-05-13 PROCEDURE — G0296 VISIT TO DETERM LDCT ELIG: HCPCS | Performed by: NURSE PRACTITIONER

## 2021-05-13 RX ORDER — ALBUTEROL SULFATE 90 UG/1
2 AEROSOL, METERED RESPIRATORY (INHALATION) EVERY 6 HOURS PRN
Qty: 1 INHALER | Refills: 11 | Status: SHIPPED | OUTPATIENT
Start: 2021-05-13 | End: 2022-02-10 | Stop reason: SDUPTHER

## 2021-05-13 RX ORDER — TIOTROPIUM BROMIDE AND OLODATEROL 3.124; 2.736 UG/1; UG/1
2 SPRAY, METERED RESPIRATORY (INHALATION) EVERY MORNING
Qty: 1 INHALER | Refills: 11 | Status: SHIPPED | OUTPATIENT
Start: 2021-05-13 | End: 2021-11-29 | Stop reason: SDUPTHER

## 2021-05-13 ASSESSMENT — ENCOUNTER SYMPTOMS
GASTROINTESTINAL NEGATIVE: 1
EYES NEGATIVE: 1
ALLERGIC/IMMUNOLOGIC NEGATIVE: 1

## 2021-05-13 NOTE — PATIENT INSTRUCTIONS
SURVEY:    You may be receiving a survey from [employer] regarding your visit today. Please complete the survey to enable us to provide the highest quality of care to you and your family. If you cannot score us a very good on any question, please call the office to discuss how we could have made your experience a very good one. Thank you. What is lung cancer screening? Lung cancer screening is a way in which doctors check the lungs for early signs of cancer in people who have no symptoms of lung cancer. A low-dose CT scan uses much less radiation than a normal CT scan and shows a more detailed image of the lungs than a standard X-ray. The goal of lung cancer screening is to find cancer early, before it has a chance to grow, spread, or cause problems. One large study found that smokers who were screened with low-dose CT scans were less likely to die of lung cancer than those who were screened with standard X-ray. Below is a summary of the things you need to know regarding screening for lung cancer with low-dose computed tomography (LDCT). This is a screening program that involves routine annual screening with LDCT studies of the lung. The LDCTs are done using low-dose radiation that is not thought to increase your cancer risk. If you have other serious medical conditions (other cancers, congestive heart failure) that limit your life expectancy to less than 10 years, you should not undergo lung cancer screening with LDCT. The chance is 20%-60% that the LDCT result will show abnormalities. This would require additional testing which could include repeat imaging or even invasive procedures. Most (about 95%) of \"abnormal\" LDCT results are false in the sense that no lung cancer is ultimately found. Additionally, some (about 10%) of the cancers found would not affect your life expectancy, even if undetected and untreated.   If you are still smoking, the single most important thing that you can do to reduce your risk of dying of lung cancer is to quit. For this screening to be covered by Medicare and most other insurers, strict criteria must be met. If you do not meet these criteria, but still wish to undergo LDCT testing, you will be required to sign a waiver indicating your willingness to pay for the scan.

## 2021-08-03 ENCOUNTER — TELEPHONE (OUTPATIENT)
Dept: ONCOLOGY | Age: 66
End: 2021-08-03

## 2021-08-03 NOTE — LETTER
8/3/2021        Jaqui Chris  [address] [facility] [address]    Dear Jaqui Chris: Your healthcare provider has ordered a low dose CT scan of the chest for lung cancer screening. You will find enclosed, information about CT lung screening. Please review the statement of understanding, you will be asked to sign a copy of this at the time of your CT scan    If you have not already been contacted to make the appointment for your scan, please call our scheduling department at [phone]    Keep in mind that CT lung screening does not take the place of smoking cessation. If you are a current smoker, you will find enclosed smoking cessation resources. Please do not hesitate to contact me if you have any questions or concerns.     [address] [facility]  [phone]

## 2021-08-10 ENCOUNTER — HOSPITAL ENCOUNTER (OUTPATIENT)
Dept: CT IMAGING | Age: 66
Discharge: HOME | End: 2021-08-12
Payer: MEDICARE

## 2021-08-10 DIAGNOSIS — Z87.891 PERSONAL HISTORY OF TOBACCO USE: ICD-10-CM

## 2021-08-10 PROCEDURE — 71271 CT THORAX LUNG CANCER SCR C-: CPT

## 2021-10-20 ENCOUNTER — HOSPITAL ENCOUNTER (OUTPATIENT)
Age: 66
Discharge: HOME | End: 2021-10-20
Payer: MEDICARE

## 2021-10-20 ENCOUNTER — OFFICE VISIT (OUTPATIENT)
Dept: CARDIOLOGY | Age: 66
End: 2021-10-20
Payer: MEDICARE

## 2021-10-20 VITALS
OXYGEN SATURATION: 95 % | RESPIRATION RATE: 16 BRPM | WEIGHT: 172 LBS | BODY MASS INDEX: 28.66 KG/M2 | HEART RATE: 75 BPM | SYSTOLIC BLOOD PRESSURE: 110 MMHG | HEIGHT: 65 IN | DIASTOLIC BLOOD PRESSURE: 67 MMHG

## 2021-10-20 DIAGNOSIS — E78.2 MIXED HYPERLIPIDEMIA: ICD-10-CM

## 2021-10-20 DIAGNOSIS — Z79.01 CHRONIC ANTICOAGULATION: ICD-10-CM

## 2021-10-20 DIAGNOSIS — I50.42 CHRONIC COMBINED SYSTOLIC (CONGESTIVE) AND DIASTOLIC (CONGESTIVE) HEART FAILURE (HCC): Primary | ICD-10-CM

## 2021-10-20 DIAGNOSIS — I50.42 CHRONIC COMBINED SYSTOLIC (CONGESTIVE) AND DIASTOLIC (CONGESTIVE) HEART FAILURE (HCC): ICD-10-CM

## 2021-10-20 DIAGNOSIS — I07.1 MODERATE TRICUSPID REGURGITATION: ICD-10-CM

## 2021-10-20 DIAGNOSIS — I48.20 CHRONIC ATRIAL FIBRILLATION (HCC): ICD-10-CM

## 2021-10-20 DIAGNOSIS — I34.0 MODERATE MITRAL REGURGITATION: ICD-10-CM

## 2021-10-20 DIAGNOSIS — J96.11 CHRONIC HYPOXEMIC RESPIRATORY FAILURE (HCC): ICD-10-CM

## 2021-10-20 DIAGNOSIS — J44.9 CHRONIC OBSTRUCTIVE PULMONARY DISEASE, UNSPECIFIED COPD TYPE (HCC): ICD-10-CM

## 2021-10-20 DIAGNOSIS — I42.8 NICM (NONISCHEMIC CARDIOMYOPATHY) (HCC): ICD-10-CM

## 2021-10-20 DIAGNOSIS — I07.1 MILD TRICUSPID REGURGITATION: ICD-10-CM

## 2021-10-20 LAB
ANION GAP SERPL CALCULATED.3IONS-SCNC: 13 MMOL/L (ref 9–17)
BNP INTERPRETATION: NORMAL
BUN BLDV-MCNC: 13 MG/DL (ref 8–23)
BUN/CREAT BLD: 26 (ref 9–20)
CALCIUM SERPL-MCNC: 8.6 MG/DL (ref 8.6–10.4)
CHLORIDE BLD-SCNC: 99 MMOL/L (ref 98–107)
CO2: 28 MMOL/L (ref 20–31)
CREAT SERPL-MCNC: 0.5 MG/DL (ref 0.5–0.9)
DIGOXIN DATE LAST DOSE: NORMAL
DIGOXIN DOSE AMOUNT: NORMAL
DIGOXIN DOSE TIME: NORMAL
DIGOXIN LEVEL: 1 NG/ML (ref 0.5–2)
GFR AFRICAN AMERICAN: >60 ML/MIN
GFR NON-AFRICAN AMERICAN: >60 ML/MIN
GFR SERPL CREATININE-BSD FRML MDRD: ABNORMAL ML/MIN/{1.73_M2}
GFR SERPL CREATININE-BSD FRML MDRD: ABNORMAL ML/MIN/{1.73_M2}
GLUCOSE BLD-MCNC: 110 MG/DL (ref 70–99)
HCT VFR BLD CALC: 37.9 % (ref 36.3–47.1)
HEMOGLOBIN: 10.8 G/DL (ref 11.9–15.1)
MCH RBC QN AUTO: 22.8 PG (ref 25.2–33.5)
MCHC RBC AUTO-ENTMCNC: 28.5 G/DL (ref 28.4–34.8)
MCV RBC AUTO: 80.1 FL (ref 82.6–102.9)
NRBC AUTOMATED: 0 PER 100 WBC
PDW BLD-RTO: 17.1 % (ref 11.8–14.4)
PLATELET # BLD: 461 K/UL (ref 138–453)
PMV BLD AUTO: 10.3 FL (ref 8.1–13.5)
POTASSIUM SERPL-SCNC: 3.8 MMOL/L (ref 3.7–5.3)
PRO-BNP: 180 PG/ML
RBC # BLD: 4.73 M/UL (ref 3.95–5.11)
SODIUM BLD-SCNC: 140 MMOL/L (ref 135–144)
WBC # BLD: 8.2 K/UL (ref 3.5–11.3)

## 2021-10-20 PROCEDURE — 99214 OFFICE O/P EST MOD 30 MIN: CPT | Performed by: INTERNAL MEDICINE

## 2021-10-20 PROCEDURE — 80162 ASSAY OF DIGOXIN TOTAL: CPT

## 2021-10-20 PROCEDURE — 80048 BASIC METABOLIC PNL TOTAL CA: CPT

## 2021-10-20 PROCEDURE — 85027 COMPLETE CBC AUTOMATED: CPT

## 2021-10-20 PROCEDURE — 83880 ASSAY OF NATRIURETIC PEPTIDE: CPT

## 2021-10-20 PROCEDURE — 36415 COLL VENOUS BLD VENIPUNCTURE: CPT

## 2021-10-20 NOTE — PROGRESS NOTES
Vivek Marie RN am scribing for and in the presence of Jenna Elias MD, F.A.C.C..    Patient: Abena Salazar  : /[id number]  Date of Admission: (Not on file)  Primary Care Physician: Erica Ochoa  [id number] Date: 10/20/2021    REASON FOR CONSULTATION: Follow-up (Hx: CHF, Moderate MR and TR and PAF COPD. Pt reports that she could walk a half of block without any shortness of breath. denies CP, lightheadedness, dizziness, palpitations. )    HPI: Ms. Jessica Montaño is a 77 y.o. female who presented today for follow-up. Mrs. Jessica Montaño initially admitted to [address] [place] [address] on 2020 because of acute on chronic congestive heart failure. Prior to her admission, she was complaining of worsening shortness of breath and bilateral lower extremity swelling for about 3 months. Patient reported having history of atrial fibrillation. No prior history of myocardial infarction. She denied having family history of premature CAD. She is a longtime smoker, used to smoke 1 pack/day for over 20 years. She is not aware of any history of hypertension, diabetes or dyslipidemia. She used to work at [employer] and retired 2 years ago. She worked for 1 year in [address] [place] [address] but recently she is just staying home particularly after the covered 19 pandemic restrictions started. Patient found to have severe cardiomyopathy. Treated initially at [facility] then transferred to [facility] in [place]. She had left and right heart catheterization that showed no significant CAD. Currently treated for nonischemic cardiomyopathy. She was discharged from [facility] on 2020 with home oxygen therapy and LifeVest.    Echo showed her ejection fraction has improved from 25 to 40%, she still has high PVCs burden. Get 24-hour Holter monitor and consider PVCs ablation if needed. LifeVest discontinued on 2020.     Echocardiogram in 2020  Showed her ejection fraction improved from 40% to 55%. She is currently treated for COPD and chronic hypoxemic respiratory failure. Using 1 L of oxygen per minute. She is seeing Dr. Tawanda Garcia     Increased 12 pounds since last visit but I believe this is her baseline dry weight based on her symptoms and breathing and she clinically looks dry -She says that she has just had a very good appetite lately. Wt Readings from Last 3 Encounters:   10/20/21 172 lb (78 kg)   05/13/21 164 lb 14.4 oz (74.8 kg)   02/24/21 158 lb (71.7 kg)         Ms. Eli Ivy reports doing fairly well since last visit and says that she has been taking her vital signs and weights daily. She has noticed her weight going up but very slowly in a long period of time. Her BP/HR have been stable and shortness of breath has been stable at this time. She says we have been walking but knows she needs to do more and watch what she is eating more as well. She denies chest pain, pressure or tightness. She denies any lightheaded/dizziness or palpitations. No fever or cough. No nausea, vomiting or change of bowel habits. No abdominal pain, bowel issues, bleeding problems, problems with medications or any other issues at this time. Past Medical History:   Diagnosis Date    A-fib [facility])     Acute respiratory failure with hypoxia (HCC) from CHF 6/18/2020    CHF (congestive heart failure) (HCC)     COPD (chronic obstructive pulmonary disease) (HCC)     Hypertension        CURRENT ALLERGIES: Patient has no known allergies. REVIEW OF SYSTEMS: 14 systems were reviewed. Pertinent positives and negatives as above, all else negative.      Past Surgical History:   Procedure Laterality Date    CHOLECYSTECTOMY      DILATATION, ESOPHAGUS      GASTRIC BYPASS SURGERY      HERNIA REPAIR      x2    TONSILLECTOMY      Social History:  Social History     Tobacco Use    Smoking status: Former Smoker     Packs/day: 1.50     Years: 25.00     Pack years: 37.50     Types: Cigarettes     Quit date: 2020     Years since quittin.3    Smokeless tobacco: Never Used   Vaping Use    Vaping Use: Never used   Substance Use Topics    Alcohol use: Not Currently    Drug use: Never        CURRENT MEDICATIONS:  Outpatient Medications Marked as Taking for the 10/20/21 encounter (Office Visit) with Jose Juan Ramos MD   Medication Sig Dispense Refill    ELIQUIS 5 MG TABS tablet TAKE ONE TABLET BY MOUTH TWICE A  tablet 3    pantoprazole (PROTONIX) 40 MG tablet TAKE ONE TABLET BY MOUTH EVERY MORNING WITH BREAKFAST 90 tablet 3    atorvastatin (LIPITOR) 10 MG tablet TAKE ONE TABLET BY MOUTH ONCE NIGHTLY 90 tablet 2    bumetanide (BUMEX) 1 MG tablet TAKE ONE TABLET BY MOUTH DAILY 90 tablet 2    losartan (COZAAR) 25 MG tablet TAKE 1/2 TABLET BY MOUTH DAILY 45 tablet 2    KLOR-CON M20 20 MEQ extended release tablet TAKE ONE TABLET BY MOUTH DAILY 90 tablet 2    digoxin (LANOXIN) 250 MCG tablet TAKE ONE TABLET BY MOUTH DAILY 90 tablet 2    metoprolol tartrate (LOPRESSOR) 25 MG tablet TAKE ONE-HALF TABLET BY MOUTH TWO TIMES A DAY 90 tablet 2    albuterol sulfate HFA (VENTOLIN HFA) 108 (90 Base) MCG/ACT inhaler Inhale 2 puffs into the lungs every 6 hours as needed for Wheezing or Shortness of Breath 1 Inhaler 11    Cholecalciferol (VITAMIN D3) 1.25 MG (23485 UT) CAPS Take 1.25 capsules by mouth once a week      calcium carbonate (OSCAL) 500 MG TABS tablet Take 500 mg by mouth daily      sertraline (ZOLOFT) 50 MG tablet Take 100 mg by mouth daily       albuterol (PROVENTIL) (2.5 MG/3ML) 0.083% nebulizer solution Take 3 mLs by nebulization every 4 hours as needed for Wheezing or Shortness of Breath 120 each 3    nitroGLYCERIN (NITROSTAT) 0.4 MG SL tablet up to max of 3 total doses.  If no relief after 1 dose, call 911. 25 tablet 0    aspirin 81 MG chewable tablet Take 1 tablet by mouth daily 30 tablet 0    Multiple Vitamins-Minerals (MULTIVITAMIN ADULT) CHEW Take 1 tablet by mouth daily            FAMILY HISTORY: No family history of premature CAD. PHYSICAL EXAM:   /67 (Site: Right Upper Arm, Position: Sitting, Cuff Size: Large Adult)   Pulse 75   Resp 16   Ht 5' 5\" (1.651 m)   Wt 172 lb (78 kg)   SpO2 95%   BMI 28.62 kg/m²  Body mass index is 28.62 kg/m². Constitutional: She is oriented to person, place, and time. She appears well-developed and well-nourished. In no acute distress. HEENT: Normocephalic and atraumatic. No JVD present. Carotid bruit is not present. No mass and no thyromegaly present. No lymphadenopathy present. Cardiovascular: Normal rate, regularly irregular rhythm, normal heart sounds. Exam reveals no gallop and no friction rubs. No murmur was heard. .   Pulmonary/Chest: Poor air entry bilaterally. . Severe kyphoscoliosis noted. No significant crackles or rhonchi. Using oxygen via nasal cannula at 2 L/min. Abdominal: Soft, non-tender. Bowel sounds and aorta are normal. She exhibits no organomegaly, mass or bruit. Extremities: Mild bilateral ankle edema. No cyanosis or clubbing. 2+ radial and carotid pulses. Distal extremity pulses: 2+ bilaterally. Neurological: She is alert and oriented to person, place, and time. No evidence of gross cranial nerve deficit. Coordination appeared normal.   Skin: Skin is warm and dry. There is no rash or diaphoresis. Psychiatric: She has a normal mood and affect.  Her speech is normal and behavior is normal.      MOST RECENT LABS ON RECORD:   Lab Results   Component Value Date    WBC 6.8 02/24/2021    HGB 11.2 (L) 02/24/2021    HCT 39.0 02/24/2021     02/24/2021    CHOL 129 10/22/2020    TRIG 97 10/22/2020    HDL 50 10/22/2020    ALT 12 10/22/2020    AST 18 10/22/2020     02/24/2021    K 4.3 02/24/2021     02/24/2021    CREATININE 0.38 (L) 02/24/2021    BUN 8 02/24/2021    CO2 31 02/24/2021    TSH 0.75 06/16/2020    INR 1.04 06/23/2020       ASSESSMENT:  Patient Active Problem List    Diagnosis Date Noted    Acute systolic CHF (congestive heart failure), NYHA class 3 (HCC)     Acute right heart failure (HCC)     COPD exacerbation (HCC)     Tobacco abuse     Hypotension     S/P cardiac cath     Nonischemic cardiomyopathy (HCC)     COPD, severe (HCC)     Acute respiratory failure with hypoxia (HCC)     Cardiomyopathy [facility]     COPD with exacerbation (HCC)     Pleural effusion, bilateral     Arrhythmia     Bilateral leg edema     Pulmonary hypertension (HCC)     Moderate tricuspid regurgitation     Moderate mitral regurgitation     Physical deconditioning     Acute combined systolic and diastolic congestive heart failure, NYHA class 4 ([facility] 06/19/2020    [facility]     Anasarca 06/17/2020    Acute pulmonary embolism ([facility] ? / Normal BLE Venous Ultrasound 2020 / Suspect Radiology Overread 06/17/2020      Diagnosis Orders   1. Chronic combined systolic (congestive) and diastolic (congestive) heart failure (HCC)  CBC    Basic Metabolic Panel    Brain Natriuretic Peptide    Digoxin Level   2. Mild tricuspid regurgitation     3. Mixed hyperlipidemia     4. Chronic obstructive pulmonary disease, unspecified COPD type ([facility]     5. Moderate mitral regurgitation     6. Moderate tricuspid regurgitation     7. Chronic hypoxemic respiratory failure (HCC)     8. NICM (nonischemic cardiomyopathy) ([facility]     9. Chronic atrial fibrillation (HCC)     10. Chronic anticoagulation         PLAN:   Chronic combined heart failure:    Beta Blocker: Continue Metoprolol tartrate (Lopressor) 25 mg 1/2 tab bid.  ACE Inibitor/ARB: Continue Losartan 12.5 mg daily.  Continue digoxin 250 mcg daily.  Diuretics: Continue bumetinide (Bumex) 1 mg every morning.  Heart failure counseling: I told them to continue wearing lower extremity compression stockings and I advised them to try and keep their legs up whenever possible and to limit salt in their diet.     Laboratory testing:  Basic metabolic panel (BMP) for now to assess her potassium and renal function.  BNP ordered to assess fluid status   Digoxin level for now to assess digoxin level. · Chronic Atrial Fibrillation: Currently maintaining sinus rhythm with very frequent unifocal PVCs.  Beta Blocker: Continue metoprolol 25 mg half tablet twice a day.  Continue digoxin 250 mcg daily.  Anti-Arrhythmic: Not indicated.  Stroke Risk: CHADS2-VASc Score: 3/9 (3.2% stroke risk)  o [id number] Score for Atrial Fibrillation Stroke Risk   Risk   Factors  Component Value   C CHF Yes 1   H HTN No 0   A2 Age >= 75 No,  (68 y.o.) 0   D DM No 0   S2 Prior Stroke/TIA No 0   V Vascular Disease No 0   A Age 74-69 Yes,  (68 y.o.) 1   Sc Sex female 1    [id number]  Score  3   o Score last updated 7/8/20 10:89 PM EDT  o Click here for a link to the UpToDate guideline \"Atrial Fibrillation: Anticoagulation therapy to prevent embolization  o Disclaimer: Risk Score calculation is dependent on accuracy of patient problem list and past encounter diagnosis.  Anticoagulation: Continue Apixaban (Eliquis) 5 mg every 12 hours.  CBC to assess hemoglobin and hematocrit. · Mild tricuspid regurgitation, moderate pulmonary hypertension:  · Beta Blocker: Continue metoprolol as above. · ACE Inibitor/ARB: Continue Losartan 12.5 mg daily. · Diuretics: Continue Bumex 1 mg daily. · I advised them to try and keep their legs up whenever possible and to limit salt in their diet. · Counseled regarding use of compression stockings. · Hyperlipidemia: Mixed  · Cholesterol Reduction Therapy: Continue Atorvastatin (Lipitor) 10 mg daily. · COPD, chronic hypoxemic respiratory failure:   · Continue to follow up with Dr. Harshad Hernandez    · Continue supplemental oxygen      Follow up:   I told Ms. Prabhjot Augustin to call my office if she had any problems, but otherwise told her to Return in about 6 months (around 4/20/2022).  However, I would be happy to see her sooner should the need arise. Sincerely,  Peri Thorne MD, MS, F.A.C.C. [facility]) [facility], [address], [address]  Phone: [phone], Fax: [phone]    Based on the patients current medical conditions, I believe the risk of significant morbidity and mortality is: Intermediate to high. The documentation recorded by the scribe, accurately and completely reflects the services I personally performed and the decisions made by me. Peri Thorne MD, F.A.C.C.  October 20, 2021

## 2021-10-20 NOTE — PATIENT INSTRUCTIONS
SURVEY:    You may be receiving a survey from [employer] regarding your visit today. Please complete the survey to enable us to provide the highest quality of care to you and your family. If you cannot score us a very good on any question, please call the office to discuss how we could have made your experience a very good one. Thank you.   Jac Hawthorne RN

## 2021-10-21 ENCOUNTER — TELEPHONE (OUTPATIENT)
Dept: CARDIOLOGY | Age: 66
End: 2021-10-21

## 2021-10-21 NOTE — TELEPHONE ENCOUNTER
----- Message from Prince Summers MD sent at 10/20/2021  9:10 PM EDT -----  Blood work is stable. Continue current therapy and follow up. Please call with questions and/or concerns.  Thank you

## 2021-11-29 DIAGNOSIS — J44.9 STAGE 3 SEVERE COPD BY GOLD CLASSIFICATION (HCC): ICD-10-CM

## 2021-11-29 NOTE — TELEPHONE ENCOUNTER
Carmencita Busby called in and requested a refill on her Stiolto inhaler. I called [facility] and said it looks like she should have refills left yet, but they said they need a new prescription. Please advise.

## 2022-02-07 RX ORDER — POTASSIUM CHLORIDE 20 MEQ/1
20 TABLET, EXTENDED RELEASE ORAL DAILY
Qty: 90 TABLET | Refills: 3 | Status: SHIPPED | OUTPATIENT
Start: 2022-02-07 | End: 2022-03-17

## 2022-02-07 NOTE — PROGRESS NOTES
Subjective:      Telephone Visit:     Patient ID: Santosh Lopez is a 77 y.o. female. HPI  Patient here for follow-up for stage III COPD. Patient was last seen in the office in May 2021 per me and in November 2020 per Dr. Ponce Lazo. Patient states that her breathing is doing well. Continues on home O2 at 2 L/min. She does monitor her SPO2 with pulse oximeter and notes that she can be sitting on her couch without being active and maintains her oxygen level without the supplemental O2. She endorses exertional dyspnea. Also endorses occasional cough productive of clear sputum. Denies any purulent sputum or hemoptysis. She is up-to-date on her COVID vaccine and booster. Denies any other health concerns at this time. Medications:   Stiolto 2.5-2.5 mcg: Albuterol HFA:  Eliquis 5 mg twice daily     PRIOR WORKUP:  PFT:  6-minute walk 9/23/2020: SPO2 on room air at rest was 93%. Patient walked for 1 minute on room air SPO2 desaturated to 79 to 88% during walking on room air. Oxygen was applied at 2 L per nasal cannula and her SPO2 remained 94 to 96% at rest and 90 to 94% with ambulation. Patient did qualify for home O2.     PFT 8/27/2020: FEV1 28% of predicted with a positive postbronchodilator change to 39% of predicted. FVC 57% of predicted with a positive bronchodilator change to 62% of predicted. FEV1/FVC ratio was 38. Lung volumes by body box show RV of 139% of predicted, TLC 94% of predicted. Diffusion capacity severely reduced when corrected for alveolar volume is 44% of predicted. Normal airway resistance. Final impression: Stage III COPD with bronchodilator response that does not meet ATS criteria. Severely reduced diffusion capacity due to underlying emphysema, interstitial disease or anemia.     Echocardiogram 12/2/2020: LVEF 55%. Mild tricuspid regurgitation, moderate pulmonary hypertension with an estimated RVSP 43 mmHg.   Grade 1 diastolic dysfunction is seen.     CT Imaging:  CT chest 8/10/2021: No axillary, mediastinal or hilar adenopathy. Mucoid or aspirated secretions are noted within the posterior trachea. Bibasilar atelectasis and parenchymal banding. Respiratory motion throughout the lungs. Moderate emphysema. Negative for pneumothorax. Traction bronchiectasis and scarring in the anterior left upper lobe. 3 mm pulmonary nodule unchanged from June 2020. CT chest 6/16/2020: Subtle filling defect of the right upper lobe subsegmental branch pulmonary embolism suspected with no central or segmental pulmonary embolism apparent. Trace bilateral pleural effusions with associated bibasilar airspace disease. Prominence of pulmonary interstitium. Vague groundglass opacities. Findings are compatible with volume overload. Scattered areas of atelectasis.     Sleep Study:     Laboratory Evaluation:  Immunizations:   Immunization History   Administered Date(s) Administered    COVID-19, Elliot Plasencia, Primary or Immunocompromised, PF, 100mcg/0.5mL 02/26/2021, 03/26/2021, 11/06/2021    Influenza, High Dose (Fluzone 65 yrs and older) 09/11/2020    Pneumococcal Polysaccharide ([id number]) 10/12/2020        No flowsheet data found. There were no vitals taken for this visit.     Past Medical History:   Diagnosis Date    [facility]fib [facility])     Acute respiratory failure with hypoxia ([facility] [address] from CHF 6/18/2020    CHF (congestive heart failure) (HCC)     COPD (chronic obstructive pulmonary disease) (HCC)     Hypertension      Past Surgical History:   Procedure Laterality Date    CHOLECYSTECTOMY      DILATATION, ESOPHAGUS      GASTRIC BYPASS SURGERY      HERNIA REPAIR      x2    TONSILLECTOMY       Family History   Adopted: Yes   Problem Relation Age of Onset    Heart Disease Mother        Social History     Socioeconomic History    Marital status:      Spouse name: Not on file    Number of children: 3    Years of education: Not on file    Highest education level: Not on file Occupational History    Not on file   Tobacco Use    Smoking status: Former Smoker     Packs/day: 1.50     Years: 25.00     Pack years: 37.50     Types: Cigarettes     Quit date: 2020     Years since quittin.6    Smokeless tobacco: Never Used   Vaping Use    Vaping Use: Never used   Substance and Sexual Activity    Alcohol use: Not Currently    Drug use: Never    Sexual activity: Not on file   Other Topics Concern    Not on file   Social History Narrative    Not on file     Social Determinants of Health     Financial Resource Strain:     Difficulty of Paying Living Expenses: Not on file   Food Insecurity:     Worried About Running Out of Food in the Last Year: Not on file    Zack of Food in the Last Year: Not on file   Transportation Needs:     Lack of Transportation (Medical): Not on file    Lack of Transportation (Non-Medical): Not on file   Physical Activity:     Days of Exercise per Week: Not on file    Minutes of Exercise per Session: Not on file   Stress:     Feeling of Stress : Not on file   Social Connections:     Frequency of Communication with Friends and Family: Not on file    Frequency of Social Gatherings with Friends and Family: Not on file    Attends Yarsani Services: Not on file    Active Member of [address] [place] [address] [employer] or Organizations: Not on file    Attends Club or Organization Meetings: Not on file    Marital Status: Not on file   Intimate Partner Violence:     Fear of Current or Ex-Partner: Not on file    Emotionally Abused: Not on file    Physically Abused: Not on file    Sexually Abused: Not on file   Housing Stability:     Unable to Pay for Housing in the Last Year: Not on file    Number of [place] in the Last Year: Not on file    Unstable Housing in the Last Year: Not on file       Review of Systems   Constitutional: Negative for fatigue and fever. HENT: Negative for postnasal drip, rhinorrhea, sinus pressure and sinus pain.     Respiratory: Negative for chest tightness, wheezing and stridor. Endorses exertional dyspnea and occasional cough productive of clear sputum. Denies any purulent sputum or hemoptysis. Cardiovascular: Negative for chest pain and leg swelling. Gastrointestinal: Negative for constipation, diarrhea, nausea and vomiting. Genitourinary: Negative for dysuria, frequency and urgency. Musculoskeletal: Negative for arthralgias, joint swelling and myalgias. Skin: Negative for rash. Neurological: Negative for dizziness, speech difficulty and headaches. Hematological: Does not bruise/bleed easily. Psychiatric/Behavioral: Negative for agitation, hallucinations, sleep disturbance and suicidal ideas. Objective:       Physical Exam    Telephone call no physical exam completed.     Wt Readings from Last 3 Encounters:   10/20/21 172 lb (78 kg)   05/13/21 164 lb 14.4 oz (74.8 kg)   02/24/21 158 lb (71.7 kg)       Results for orders placed or performed during the hospital encounter of 10/20/21   Digoxin Level   Result Value Ref Range    Digoxin Lvl 1.0 0.5 - 2.0 ng/mL    Digoxin Dose Amount NOT REPORTED     Digoxin Date Last Dose NOT REPORTED     Digoxin Dose Time NOT REPORTED    Brain Natriuretic Peptide   Result Value Ref Range    Pro- <300 pg/mL    BNP Interpretation NOT REPORTED    Basic Metabolic Panel   Result Value Ref Range    Glucose 110 (H) 70 - 99 mg/dL    BUN 13 8 - 23 mg/dL    CREATININE 0.50 0.50 - 0.90 mg/dL    Bun/Cre Ratio 26 (H) 9 - 20    Calcium 8.6 8.6 - 10.4 mg/dL    Sodium 140 135 - 144 mmol/L    Potassium 3.8 3.7 - 5.3 mmol/L    Chloride 99 98 - 107 mmol/L    CO2 28 20 - 31 mmol/L    Anion Gap 13 9 - 17 mmol/L    GFR Non-African American >60 >60 mL/min    GFR African American >60 >60 mL/min    GFR Comment          GFR Staging         CBC   Result Value Ref Range    WBC 8.2 3.5 - 11.3 k/uL    RBC 4.73 3.95 - 5.11 m/uL    Hemoglobin 10.8 (L) 11.9 - 15.1 g/dL    Hematocrit 37.9 36.3 - 47.1 %    MCV 80.1 (L) 82.6 - 102.9 fL    MCH 22.8 (L) 25.2 - 33.5 pg    MCHC 28.5 28.4 - 34.8 g/dL    RDW 17.1 (H) 11.8 - 14.4 %    Platelets 331 (H) 802 - 453 k/uL    MPV 10.3 8.1 - 13.5 fL    NRBC Automated 0.0 0.0 per 100 WBC       No results found. Assessment:      1. Former heavy tobacco smoker    2. Lung nodule    3. Stopped smoking with greater than 30 pack year history    4. Stage 3 severe COPD by GOLD classification ([facility]    5. Chronic hypoxemic respiratory failure ([facility]          Plan:      1. Medications reviewed, refilled today. 2. Educated and clarified the medication use. 3. Recommend flu vaccination in the fall annually. 4. Patient is up-to-date with pneumococcal vaccinations from pulmonary perspective. 5. Patient has received Covid vaccination and booster. Discussed strategies to protect against Covid 19.   6. Maintain an active lifestyle. 7. Patient's questions were answered to her satisfaction. 6. Former smoking ongoing smoking cessation advised. 9. Supplemental oxygen continued at 2 L/min around-the-clock. Patient advised that while at rest if her SPO2 is greater than 88% on room air she is okay to have her oxygen off for short relief periods. 10. Pulmonary function tests were reviewed  11. CT scan of the chest was reviewed-repeat in August 2022 ordered today. 12. We'll see the patient back in 6 months after CT chest completed          Electronically signed by Woodroe Goodpasture, APRN - CNP on 2/10/2022 at 9:37 AM     Consent:  He and/or health care decision maker is aware that that he may receive a bill for this telephone service, depending on his insurance coverage, and has provided verbal consent to proceed: Yes      I affirm this is a Patient Initiated Episode with an Established Patient who has not had a related appointment within my department in the past 7 days or scheduled within the next 24 hours.     Total Time: minutes: 5-10 minutes    Note: not billable if this call serves to triage the patient into an appointment for the relevant concern

## 2022-02-10 ENCOUNTER — VIRTUAL VISIT (OUTPATIENT)
Dept: PULMONOLOGY | Age: 67
End: 2022-02-10
Payer: MEDICARE

## 2022-02-10 DIAGNOSIS — Z87.891 FORMER HEAVY TOBACCO SMOKER: Primary | ICD-10-CM

## 2022-02-10 DIAGNOSIS — R91.1 LUNG NODULE: ICD-10-CM

## 2022-02-10 DIAGNOSIS — Z87.891 STOPPED SMOKING WITH GREATER THAN 30 PACK YEAR HISTORY: ICD-10-CM

## 2022-02-10 DIAGNOSIS — J44.9 STAGE 3 SEVERE COPD BY GOLD CLASSIFICATION (HCC): ICD-10-CM

## 2022-02-10 DIAGNOSIS — J96.11 CHRONIC HYPOXEMIC RESPIRATORY FAILURE (HCC): ICD-10-CM

## 2022-02-10 PROCEDURE — G2012 BRIEF CHECK IN BY MD/QHP: HCPCS | Performed by: NURSE PRACTITIONER

## 2022-02-10 RX ORDER — ALBUTEROL SULFATE 90 UG/1
2 AEROSOL, METERED RESPIRATORY (INHALATION) EVERY 6 HOURS PRN
Qty: 1 EACH | Refills: 11 | Status: SHIPPED | OUTPATIENT
Start: 2022-02-10 | End: 2023-02-10

## 2022-02-10 ASSESSMENT — ENCOUNTER SYMPTOMS
VOMITING: 0
RHINORRHEA: 0
NAUSEA: 0
SINUS PRESSURE: 0
STRIDOR: 0
CONSTIPATION: 0
WHEEZING: 0
SINUS PAIN: 0
CHEST TIGHTNESS: 0
DIARRHEA: 0

## 2022-03-16 RX ORDER — ATORVASTATIN CALCIUM 10 MG/1
TABLET, FILM COATED ORAL
Qty: 90 TABLET | Refills: 3 | Status: SHIPPED | OUTPATIENT
Start: 2022-03-16

## 2022-03-17 RX ORDER — BUMETANIDE 1 MG/1
TABLET ORAL
Qty: 90 TABLET | Refills: 2 | Status: SHIPPED | OUTPATIENT
Start: 2022-03-17

## 2022-03-17 RX ORDER — DIGOXIN 250 MCG
TABLET ORAL
Qty: 90 TABLET | Refills: 2 | Status: SHIPPED | OUTPATIENT
Start: 2022-03-17

## 2022-03-17 RX ORDER — POTASSIUM CHLORIDE 1500 MG/1
TABLET, EXTENDED RELEASE ORAL
Qty: 90 TABLET | Refills: 3 | Status: SHIPPED | OUTPATIENT
Start: 2022-03-17

## 2022-05-02 RX ORDER — LOSARTAN POTASSIUM 25 MG/1
TABLET ORAL
Qty: 45 TABLET | Refills: 3 | Status: SHIPPED | OUTPATIENT
Start: 2022-05-02

## 2022-05-02 RX ORDER — LOSARTAN POTASSIUM 25 MG/1
TABLET ORAL
Qty: 45 TABLET | Refills: 2 | Status: SHIPPED | OUTPATIENT
Start: 2022-05-02 | End: 2022-05-02

## 2022-06-03 ENCOUNTER — HOSPITAL ENCOUNTER (OUTPATIENT)
Age: 67
Discharge: HOME | End: 2022-06-03
Payer: MEDICARE

## 2022-06-03 LAB
ALBUMIN SERPL-MCNC: 4.1 G/DL (ref 3.5–5.2)
ALBUMIN/GLOBULIN RATIO: 1.2 (ref 1–2.5)
ALP BLD-CCNC: 147 U/L (ref 35–104)
ALT SERPL-CCNC: 11 U/L (ref 5–33)
ANION GAP SERPL CALCULATED.3IONS-SCNC: 8 MMOL/L (ref 9–17)
AST SERPL-CCNC: 15 U/L
BILIRUB SERPL-MCNC: 0.3 MG/DL (ref 0.3–1.2)
BUN BLDV-MCNC: 11 MG/DL (ref 8–23)
BUN/CREAT BLD: 23 (ref 9–20)
CALCIUM SERPL-MCNC: 8.6 MG/DL (ref 8.6–10.4)
CHLORIDE BLD-SCNC: 100 MMOL/L (ref 98–107)
CHOLESTEROL/HDL RATIO: 2.6
CHOLESTEROL: 134 MG/DL
CO2: 31 MMOL/L (ref 20–31)
CREAT SERPL-MCNC: 0.48 MG/DL (ref 0.5–0.9)
GFR AFRICAN AMERICAN: >60 ML/MIN
GFR NON-AFRICAN AMERICAN: >60 ML/MIN
GFR SERPL CREATININE-BSD FRML MDRD: ABNORMAL ML/MIN/{1.73_M2}
GFR SERPL CREATININE-BSD FRML MDRD: ABNORMAL ML/MIN/{1.73_M2}
GLUCOSE BLD-MCNC: 101 MG/DL (ref 70–99)
HCT VFR BLD CALC: 35.5 % (ref 36.3–47.1)
HDLC SERPL-MCNC: 52 MG/DL
HEMOGLOBIN: 10.2 G/DL (ref 11.9–15.1)
LDL CHOLESTEROL: 58 MG/DL (ref 0–130)
MCH RBC QN AUTO: 22.5 PG (ref 25.2–33.5)
MCHC RBC AUTO-ENTMCNC: 28.7 G/DL (ref 28.4–34.8)
MCV RBC AUTO: 78.2 FL (ref 82.6–102.9)
NRBC AUTOMATED: 0 PER 100 WBC
PDW BLD-RTO: 17.5 % (ref 11.8–14.4)
PLATELET # BLD: 399 K/UL (ref 138–453)
PMV BLD AUTO: 10.7 FL (ref 8.1–13.5)
POTASSIUM SERPL-SCNC: 3.9 MMOL/L (ref 3.7–5.3)
RBC # BLD: 4.54 M/UL (ref 3.95–5.11)
SODIUM BLD-SCNC: 139 MMOL/L (ref 135–144)
TOTAL PROTEIN: 7.4 G/DL (ref 6.4–8.3)
TRIGL SERPL-MCNC: 121 MG/DL
VITAMIN D 25-HYDROXY: 23.6 NG/ML
WBC # BLD: 10.9 K/UL (ref 3.5–11.3)

## 2022-06-03 PROCEDURE — 36415 COLL VENOUS BLD VENIPUNCTURE: CPT

## 2022-06-03 PROCEDURE — 80061 LIPID PANEL: CPT

## 2022-06-03 PROCEDURE — 82306 VITAMIN D 25 HYDROXY: CPT

## 2022-06-03 PROCEDURE — 80053 COMPREHEN METABOLIC PANEL: CPT

## 2022-06-03 PROCEDURE — 85027 COMPLETE CBC AUTOMATED: CPT

## 2022-06-15 ENCOUNTER — OFFICE VISIT (OUTPATIENT)
Dept: CARDIOLOGY | Age: 67
End: 2022-06-15
Payer: MEDICARE

## 2022-06-15 VITALS
WEIGHT: 187 LBS | DIASTOLIC BLOOD PRESSURE: 66 MMHG | BODY MASS INDEX: 31.16 KG/M2 | RESPIRATION RATE: 17 BRPM | HEIGHT: 65 IN | OXYGEN SATURATION: 97 % | HEART RATE: 75 BPM | SYSTOLIC BLOOD PRESSURE: 110 MMHG

## 2022-06-15 DIAGNOSIS — I42.8 NICM (NONISCHEMIC CARDIOMYOPATHY) (HCC): ICD-10-CM

## 2022-06-15 DIAGNOSIS — I48.20 CHRONIC ATRIAL FIBRILLATION (HCC): ICD-10-CM

## 2022-06-15 DIAGNOSIS — J44.9 CHRONIC OBSTRUCTIVE PULMONARY DISEASE, UNSPECIFIED COPD TYPE (HCC): ICD-10-CM

## 2022-06-15 DIAGNOSIS — E78.2 MIXED HYPERLIPIDEMIA: ICD-10-CM

## 2022-06-15 DIAGNOSIS — I34.0 MODERATE MITRAL REGURGITATION: ICD-10-CM

## 2022-06-15 DIAGNOSIS — I50.42 CHRONIC COMBINED SYSTOLIC (CONGESTIVE) AND DIASTOLIC (CONGESTIVE) HEART FAILURE (HCC): Primary | ICD-10-CM

## 2022-06-15 DIAGNOSIS — Z79.01 CHRONIC ANTICOAGULATION: ICD-10-CM

## 2022-06-15 DIAGNOSIS — I07.1 MILD TRICUSPID REGURGITATION: ICD-10-CM

## 2022-06-15 PROCEDURE — 99214 OFFICE O/P EST MOD 30 MIN: CPT | Performed by: INTERNAL MEDICINE

## 2022-06-15 PROCEDURE — 93000 ELECTROCARDIOGRAM COMPLETE: CPT | Performed by: INTERNAL MEDICINE

## 2022-06-15 PROCEDURE — 1123F ACP DISCUSS/DSCN MKR DOCD: CPT | Performed by: INTERNAL MEDICINE

## 2022-06-15 NOTE — PATIENT INSTRUCTIONS
SURVEY:    You may be receiving a survey from [employer] regarding your visit today. Please complete the survey to enable us to provide the highest quality of care to you and your family. If you cannot score us a very good on any question, please call the office to discuss how we could have made your experience a very good one. Thank you.

## 2022-06-15 NOTE — PROGRESS NOTES
Lee Subramanian am scribing for and in the presence of Jeannette Boggs MD, F.A.C.C..    Patient: Jaqui Chris  :   Primary Care Physician: Lauri Gunter  Today's Date: 6/15/2022    REASON FOR CONSULTATION: Congestive Heart Failure (Hx:CHF,Chronic AFib,Mild Tricuspid Regurg,HLD,COPD. Echo 20. She has been doing well, did gain some weight. Some SOb with the weather. Denies: CP, Lightheaded/dizziness, Palpitations. )    HPI: Ms. Marizol More is a 79 y.o. female who presented today for follow-up. Mrs. Marizol More initially admitted to [facility] on 2020 because of acute on chronic congestive heart failure. Prior to her admission, she was complaining of worsening shortness of breath and bilateral lower extremity swelling for about 3 months. Patient reported having history of atrial fibrillation. No prior history of myocardial infarction. She denied having family history of premature CAD. She is a longtime smoker, used to smoke 1 pack/day for over 20 years. She is not aware of any history of hypertension, diabetes or dyslipidemia. She used to work at [employer] and retired 2 years ago. She worked for 1 year in [employer]  but recently she is just staying home particularly after the covered 19 pandemic restrictions started. Patient found to have severe cardiomyopathy. Treated initially at [facility] then transferred to [facility] in [place]. She had left and right heart catheterization that showed no significant CAD. Currently treated for nonischemic cardiomyopathy. She was discharged from [facility] on 2020 with home oxygen therapy and LifeVest.    Echo showed her ejection fraction has improved from 25 to 40%, she still has high PVCs burden. Get 24-hour Holter monitor and consider PVCs ablation if needed. LifeVest discontinued on 2020.     Echocardiogram in 2020  Showed her ejection fraction improved from 40% to 55%. She is currently treated for COPD and chronic hypoxemic respiratory failure. Using 1 L of oxygen per minute. She is seeing Dr. Vidhya Ojeda     EKG done today in office on 6/15/2022:     Increased 15 pounds since last visit but I believe this is her baseline dry weight based on her symptoms and breathing and she clinically looks dry -She says that she has just had a very good appetite lately. Wt Readings from Last 3 Encounters:   06/15/22 187 lb (84.8 kg)   10/20/21 172 lb (78 kg)   05/13/21 164 lb 14.4 oz (74.8 kg)       Ms. Betty Rogers reports doing fairly well since last visit other than some gradual weight gain. She has been taking her vital signs and weights daily. She has not been doing much physical activity due to the heat outside but does try to walk every hour around her house and is averaging [id number] steps a day. She continues to use oxygen but her shortness of breath is worse with the heat. She denies chest pain, pressure or tightness. She denies any lightheaded/dizziness or palpitations. No cough, fever or chills. No abdominal pain, nausea or vomiting. No bleeding problems, bowel issues, problems with medications or any other issues at this time. Past Medical History:   Diagnosis Date    A-fib [facility])     Acute respiratory failure with hypoxia (HCC) from CHF 6/18/2020    CHF (congestive heart failure) (HCC)     COPD (chronic obstructive pulmonary disease) (HCC)     Hypertension        CURRENT ALLERGIES: Patient has no known allergies. REVIEW OF SYSTEMS: 14 systems were reviewed. Pertinent positives and negatives as above, all else negative.      Past Surgical History:   Procedure Laterality Date    CHOLECYSTECTOMY      DILATATION, ESOPHAGUS      GASTRIC BYPASS SURGERY      HERNIA REPAIR      x2    TONSILLECTOMY      Social History:  Social History     Tobacco Use    Smoking status: Former Smoker     Packs/day: 1.50     Years: 25.00     Pack years: 37.50 Types: Cigarettes     Quit date: 2020     Years since quittin.9    Smokeless tobacco: Never Used   Vaping Use    Vaping Use: Never used   Substance Use Topics    Alcohol use: Not Currently    Drug use: Never        CURRENT MEDICATIONS:  Outpatient Medications Marked as Taking for the 6/15/22 encounter (Office Visit) with Abbie Heimlich, MD   Medication Sig Dispense Refill    losartan (COZAAR) 25 MG tablet TAKE 1/2 TABLET BY MOUTH DAILY 45 tablet 3    KLOR-CON M20 20 MEQ extended release tablet TAKE ONE TABLET BY MOUTH DAILY 90 tablet 3    metoprolol tartrate (LOPRESSOR) 25 MG tablet TAKE 1/2 TABLET BY MOUTH TWICE A DAY 90 tablet 2    bumetanide (BUMEX) 1 MG tablet TAKE ONE TABLET BY MOUTH DAILY 90 tablet 2    digoxin (LANOXIN) 250 MCG tablet TAKE ONE TABLET BY MOUTH DAILY 90 tablet 2    atorvastatin (LIPITOR) 10 MG tablet TAKE ONE TABLET BY MOUTH ONCE NIGHTLY 90 tablet 3    albuterol sulfate HFA (VENTOLIN HFA) 108 (90 Base) MCG/ACT inhaler Inhale 2 puffs into the lungs every 6 hours as needed for Wheezing or Shortness of Breath 1 each 11    tiotropium-olodaterol (STIOLTO RESPIMAT) 2.5-2.5 MCG/ACT AERS Inhale 2 puffs into the lungs daily 1 each 11    apixaban (ELIQUIS) 5 MG TABS tablet Take 1 tablet by mouth 2 times daily 180 tablet 3    pantoprazole (PROTONIX) 40 MG tablet TAKE ONE TABLET BY MOUTH EVERY MORNING WITH BREAKFAST 90 tablet 3    Cholecalciferol (VITAMIN D3) 1.25 MG (33090 UT) CAPS Take 1.25 capsules by mouth daily       calcium carbonate (OSCAL) 500 MG TABS tablet Take 500 mg by mouth daily      sertraline (ZOLOFT) 100 MG tablet Take 100 mg by mouth daily       aspirin 81 MG chewable tablet Take 1 tablet by mouth daily 30 tablet 0    Multiple Vitamins-Minerals (MULTIVITAMIN ADULT) CHEW Take 1 tablet by mouth daily            FAMILY HISTORY: No family history of premature CAD.     PHYSICAL EXAM:   /66 (Site: Left Upper Arm, Position: Sitting, Cuff Size: Large Adult) Pulse 75   Resp 17   Ht 5' 5\" (1.651 m)   Wt 187 lb (84.8 kg)   SpO2 97%   BMI 31.12 kg/m²  Body mass index is 31.12 kg/m². Constitutional: She is oriented to person, place, and time. She appears well-developed and well-nourished. In no acute distress. HEENT: Normocephalic and atraumatic. No JVD present. Carotid bruit is not present. No mass and no thyromegaly present. No lymphadenopathy present. Cardiovascular: Normal rate, regularly irregular rhythm, normal heart sounds. Exam reveals no gallop and no friction rubs. No murmur was heard. .   Pulmonary/Chest: Poor air entry bilaterally. . Severe kyphoscoliosis noted. No significant crackles or rhonchi. Using oxygen via nasal cannula at 2 L/min. Abdominal: Soft, non-tender. Bowel sounds and aorta are normal. She exhibits no organomegaly, mass or bruit. Extremities: Mild bilateral ankle edema. No cyanosis or clubbing. 2+ radial and carotid pulses. Distal extremity pulses: 2+ bilaterally. Neurological: She is alert and oriented to person, place, and time. No evidence of gross cranial nerve deficit. Coordination appeared normal.   Skin: Skin is warm and dry. There is no rash or diaphoresis. Psychiatric: She has a normal mood and affect.  Her speech is normal and behavior is normal.      MOST RECENT LABS ON RECORD:   Lab Results   Component Value Date    WBC 10.9 06/03/2022    HGB 10.2 (L) 06/03/2022    HCT 35.5 (L) 06/03/2022     06/03/2022    CHOL 134 06/03/2022    TRIG 121 06/03/2022    HDL 52 06/03/2022    ALT 11 06/03/2022    AST 15 06/03/2022     06/03/2022    K 3.9 06/03/2022     06/03/2022    CREATININE 0.48 (L) 06/03/2022    BUN 11 06/03/2022    CO2 31 06/03/2022    TSH 0.75 06/16/2020    INR 1.04 06/23/2020       ASSESSMENT:  Patient Active Problem List    Diagnosis Date Noted    Acute systolic CHF (congestive heart failure), NYHA class 3 (HCC)     Acute right heart failure (HCC)     COPD exacerbation (HCC)     Tobacco abuse  Hypotension     S/P cardiac cath     Nonischemic cardiomyopathy (HCC)     COPD, severe (HCC)     Acute respiratory failure with hypoxia (HCC)     Cardiomyopathy (HCC)     COPD with exacerbation (HCC)     Pleural effusion, bilateral     Arrhythmia     Bilateral leg edema     Pulmonary hypertension (HCC)     Moderate tricuspid regurgitation     Moderate mitral regurgitation     Physical deconditioning     Acute combined systolic and diastolic congestive heart failure, NYHA class 4 ([address] 06/19/2020    CHF (congestive heart failure) ([address]     Anasarca 06/17/2020    Acute pulmonary embolism ([address] ? / Normal BLE Venous Ultrasound 2020 / Suspect Radiology Overread 06/17/2020      Diagnosis Orders   1. Chronic combined systolic (congestive) and diastolic (congestive) heart failure ([address]     2. Mild tricuspid regurgitation     3. Mixed hyperlipidemia     4. Chronic obstructive pulmonary disease, unspecified COPD type ([address]     5. Moderate mitral regurgitation     6. NICM (nonischemic cardiomyopathy) ([address]     7. Chronic atrial fibrillation (HCC)     8. Chronic anticoagulation         PLAN:   Chronic combined heart failure: Ejection fraction has improved according to her last echo back in 2020.  Beta Blocker: Continue Metoprolol tartrate (Lopressor) 25 mg 1/2 tab bid. ACE Inibitor/ARB: Continue losartan (Cozaar) 25 mg, 1/2 tab daily.  Continue digoxin 250 mcg daily.  Diuretics: Continue bumetinide (Bumex) 1 mg every morning.  Heart failure counseling: I told them to continue wearing lower extremity compression stockings and I advised them to try and keep their legs up whenever possible and to limit salt in their diet.  I reviewed her most recent blood work, kidney function and serum potassium are good. · Chronic Atrial Fibrillation: Currently maintaining sinus rhythm with very frequent unifocal PVCs.    Beta Blocker: Continue Metoprolol tartrate (Lopressor) 25 mg 1/2 tab bid.   Continue digoxin 250 mcg daily.  Anti-Arrhythmic: Not indicated.  Stroke Risk: CHADS2-VASc Score: 3/9 (3.2% stroke risk)  o [id number] Score for Atrial Fibrillation Stroke Risk   Risk   Factors  Component Value   C CHF Yes 1   H HTN No 0   A2 Age >= 75 No,  (78 y.o.) 0   D DM No 0   S2 Prior Stroke/TIA No 0   V Vascular Disease No 0   A Age 74-69 Yes,  (78 y.o.) 1   Sc Sex female 1    [id number]  Score  3   o Score last updated 7/8/20 61:03 PM EDT  o Click here for a link to the UpToDate guideline \"Atrial Fibrillation: Anticoagulation therapy to prevent embolization  o Disclaimer: Risk Score calculation is dependent on accuracy of patient problem list and past encounter diagnosis.  Anticoagulation: Continue Apixaban (Eliquis) 5 mg every 12 hours. · Mild Tricuspid Regurgitation/Moderate Pulmonary Hypertension:  · Beta Blocker: Continue Metoprolol tartrate (Lopressor) 25 mg 1/2 tab bid. ACE Inibitor/ARB: Continue losartan (Cozaar) 25 mg, 1/2 tab daily. Diuretics: Continue Bumex 1 mg daily. I advised them to try and keep their legs up whenever possible and to limit salt in their diet. · Hyperlipidemia: Mixed - Last LDL on 6/3/2022 was 58 mg/dL  · Cholesterol Reduction Therapy: Continue Atorvastatin (Lipitor) 10 mg daily      · COPD, chronic hypoxemic respiratory failure:   · Continue to follow up with Dr. Jaimee Mojica    · Continue supplemental oxygen      Follow up:   I told Ms. Adela Musa to call my office if she had any problems, but otherwise told her to Return in about 6 months (around 12/15/2022) for Follow up. However, I would be happy to see her sooner should the need arise. Sincerely,  Jessica Ragland MD, [facility] Cardiology Specialist    [address], [place], [address] [place] [address]  Phone: [phone], Fax: [phone]     I believe that the risk of significant morbidity and mortality related to the patient's current medical conditions are: intermediate-high.  Approximately 35 minutes were spent during prep work, discussion and exam of the patient, and follow up documentation and all of their questions were answered. The documentation recorded by the scribe, accurately and completely reflects the services I personally performed and the decisions made by me. Jose Carlos Nguyen MD, F.A.C.C.  Katie 15, 2022

## 2022-06-21 ENCOUNTER — TELEPHONE (OUTPATIENT)
Dept: CARDIOLOGY | Age: 67
End: 2022-06-21

## 2022-07-05 RX ORDER — PANTOPRAZOLE SODIUM 40 MG/1
TABLET, DELAYED RELEASE ORAL
Qty: 90 TABLET | Refills: 3 | Status: SHIPPED | OUTPATIENT
Start: 2022-07-05 | End: 2022-07-11

## 2022-07-11 ENCOUNTER — TELEPHONE (OUTPATIENT)
Dept: ONCOLOGY | Age: 67
End: 2022-07-11

## 2022-07-11 RX ORDER — PANTOPRAZOLE SODIUM 40 MG/1
TABLET, DELAYED RELEASE ORAL
Qty: 90 TABLET | Refills: 3 | Status: SHIPPED | OUTPATIENT
Start: 2022-07-11

## 2022-09-16 ENCOUNTER — HOSPITAL ENCOUNTER (OUTPATIENT)
Dept: CT IMAGING | Age: 67
Discharge: HOME | End: 2022-09-18
Payer: MEDICARE

## 2022-09-16 DIAGNOSIS — Z87.891 FORMER HEAVY TOBACCO SMOKER: ICD-10-CM

## 2022-09-16 DIAGNOSIS — R91.1 LUNG NODULE: ICD-10-CM

## 2022-09-16 PROCEDURE — 71271 CT THORAX LUNG CANCER SCR C-: CPT

## 2022-12-12 RX ORDER — BUMETANIDE 1 MG/1
TABLET ORAL
Qty: 90 TABLET | Refills: 3 | Status: SHIPPED | OUTPATIENT
Start: 2022-12-12

## 2022-12-12 RX ORDER — DIGOXIN 250 MCG
TABLET ORAL
Qty: 90 TABLET | Refills: 3 | Status: SHIPPED | OUTPATIENT
Start: 2022-12-12

## 2023-01-20 ENCOUNTER — TELEPHONE (OUTPATIENT)
Dept: PULMONOLOGY | Age: 68
End: 2023-01-20

## 2023-01-20 DIAGNOSIS — J44.9 STAGE 3 SEVERE COPD BY GOLD CLASSIFICATION (HCC): Primary | ICD-10-CM

## 2023-01-31 RX ORDER — POTASSIUM CHLORIDE 1500 MG/1
TABLET, FILM COATED, EXTENDED RELEASE ORAL
Qty: 90 TABLET | Refills: 1 | Status: SHIPPED | OUTPATIENT
Start: 2023-01-31

## 2023-02-03 RX ORDER — LOSARTAN POTASSIUM 25 MG/1
TABLET ORAL
Qty: 45 TABLET | Refills: 3 | Status: SHIPPED | OUTPATIENT
Start: 2023-02-03

## 2023-03-02 RX ORDER — ATORVASTATIN CALCIUM 10 MG/1
TABLET, FILM COATED ORAL
Qty: 90 TABLET | Refills: 3 | Status: SHIPPED | OUTPATIENT
Start: 2023-03-02

## 2023-03-03 ENCOUNTER — NURSE ONLY (OUTPATIENT)
Dept: PULMONOLOGY | Age: 68
End: 2023-03-03

## 2023-03-03 DIAGNOSIS — J44.9 STAGE 3 SEVERE COPD BY GOLD CLASSIFICATION (HCC): Primary | ICD-10-CM

## 2023-03-13 RX ORDER — POTASSIUM CHLORIDE 1500 MG/1
TABLET, EXTENDED RELEASE ORAL
Qty: 90 TABLET | Refills: 3 | Status: SHIPPED | OUTPATIENT
Start: 2023-03-13

## 2023-03-27 ENCOUNTER — OFFICE VISIT (OUTPATIENT)
Dept: PULMONOLOGY | Age: 68
End: 2023-03-27

## 2023-03-27 VITALS
OXYGEN SATURATION: 93 % | WEIGHT: 174.4 LBS | HEART RATE: 77 BPM | SYSTOLIC BLOOD PRESSURE: 129 MMHG | BODY MASS INDEX: 29.06 KG/M2 | TEMPERATURE: 97.2 F | DIASTOLIC BLOOD PRESSURE: 69 MMHG | HEIGHT: 65 IN | RESPIRATION RATE: 16 BRPM

## 2023-03-27 DIAGNOSIS — I27.20 PULMONARY HYPERTENSION (HCC): ICD-10-CM

## 2023-03-27 DIAGNOSIS — R91.1 LUNG NODULE: ICD-10-CM

## 2023-03-27 DIAGNOSIS — J44.9 STAGE 3 SEVERE COPD BY GOLD CLASSIFICATION (HCC): Primary | ICD-10-CM

## 2023-03-27 DIAGNOSIS — J96.11 CHRONIC HYPOXEMIC RESPIRATORY FAILURE (HCC): ICD-10-CM

## 2023-03-27 DIAGNOSIS — Z87.891 PERSONAL HISTORY OF TOBACCO USE: ICD-10-CM

## 2023-04-18 ENCOUNTER — HOSPITAL ENCOUNTER (OUTPATIENT)
Age: 68
Discharge: HOME | End: 2023-04-18
Payer: MEDICARE

## 2023-04-18 ENCOUNTER — OFFICE VISIT (OUTPATIENT)
Dept: CARDIOLOGY | Age: 68
End: 2023-04-18
Payer: MEDICARE

## 2023-04-18 VITALS
WEIGHT: 175.2 LBS | BODY MASS INDEX: 29.19 KG/M2 | RESPIRATION RATE: 18 BRPM | HEIGHT: 65 IN | DIASTOLIC BLOOD PRESSURE: 61 MMHG | HEART RATE: 72 BPM | SYSTOLIC BLOOD PRESSURE: 105 MMHG | OXYGEN SATURATION: 98 %

## 2023-04-18 DIAGNOSIS — J44.9 CHRONIC OBSTRUCTIVE PULMONARY DISEASE, UNSPECIFIED COPD TYPE (HCC): ICD-10-CM

## 2023-04-18 DIAGNOSIS — I50.42 CHRONIC COMBINED SYSTOLIC (CONGESTIVE) AND DIASTOLIC (CONGESTIVE) HEART FAILURE (HCC): ICD-10-CM

## 2023-04-18 DIAGNOSIS — E78.2 MIXED HYPERLIPIDEMIA: ICD-10-CM

## 2023-04-18 DIAGNOSIS — I27.20 MODERATE PULMONARY HYPERTENSION (HCC): ICD-10-CM

## 2023-04-18 DIAGNOSIS — I07.1 MILD TRICUSPID REGURGITATION: ICD-10-CM

## 2023-04-18 DIAGNOSIS — I48.20 CHRONIC ATRIAL FIBRILLATION (HCC): ICD-10-CM

## 2023-04-18 DIAGNOSIS — Z79.01 CHRONIC ANTICOAGULATION: ICD-10-CM

## 2023-04-18 LAB
ANION GAP SERPL CALCULATED.3IONS-SCNC: 10 MMOL/L (ref 9–17)
BUN SERPL-MCNC: 10 MG/DL (ref 8–23)
BUN/CREAT BLD: 20 (ref 9–20)
CALCIUM SERPL-MCNC: 8.7 MG/DL (ref 8.6–10.4)
CHLORIDE SERPL-SCNC: 99 MMOL/L (ref 98–107)
CHOLEST SERPL-MCNC: 144 MG/DL
CHOLESTEROL/HDL RATIO: 2.2
CO2 SERPL-SCNC: 32 MMOL/L (ref 20–31)
CREAT SERPL-MCNC: 0.51 MG/DL (ref 0.5–0.9)
GFR SERPL CREATININE-BSD FRML MDRD: >60 ML/MIN/1.73M2
GLUCOSE SERPL-MCNC: 105 MG/DL (ref 70–99)
HCT VFR BLD AUTO: 33.5 % (ref 36.3–47.1)
HDLC SERPL-MCNC: 66 MG/DL
HGB BLD-MCNC: 9.3 G/DL (ref 11.9–15.1)
LDLC SERPL CALC-MCNC: 54 MG/DL (ref 0–130)
MCH RBC QN AUTO: 19.3 PG (ref 25.2–33.5)
MCHC RBC AUTO-ENTMCNC: 27.8 G/DL (ref 28.4–34.8)
MCV RBC AUTO: 69.6 FL (ref 82.6–102.9)
NRBC AUTOMATED: 0 PER 100 WBC
PDW BLD-RTO: 20.6 % (ref 11.8–14.4)
PLATELET # BLD AUTO: 389 K/UL (ref 138–453)
PMV BLD AUTO: 10.3 FL (ref 8.1–13.5)
POTASSIUM SERPL-SCNC: 3.5 MMOL/L (ref 3.7–5.3)
RBC # BLD: 4.81 M/UL (ref 3.95–5.11)
SODIUM SERPL-SCNC: 141 MMOL/L (ref 135–144)
TRIGL SERPL-MCNC: 120 MG/DL
WBC # BLD AUTO: 9.1 K/UL (ref 3.5–11.3)

## 2023-04-18 PROCEDURE — 85027 COMPLETE CBC AUTOMATED: CPT

## 2023-04-18 PROCEDURE — G8400 PT W/DXA NO RESULTS DOC: HCPCS | Performed by: PHYSICIAN ASSISTANT

## 2023-04-18 PROCEDURE — G8417 CALC BMI ABV UP PARAM F/U: HCPCS | Performed by: PHYSICIAN ASSISTANT

## 2023-04-18 PROCEDURE — 1036F TOBACCO NON-USER: CPT | Performed by: PHYSICIAN ASSISTANT

## 2023-04-18 PROCEDURE — 3023F SPIROM DOC REV: CPT | Performed by: PHYSICIAN ASSISTANT

## 2023-04-18 PROCEDURE — 36415 COLL VENOUS BLD VENIPUNCTURE: CPT

## 2023-04-18 PROCEDURE — 3017F COLORECTAL CA SCREEN DOC REV: CPT | Performed by: PHYSICIAN ASSISTANT

## 2023-04-18 PROCEDURE — 1090F PRES/ABSN URINE INCON ASSESS: CPT | Performed by: PHYSICIAN ASSISTANT

## 2023-04-18 PROCEDURE — G8427 DOCREV CUR MEDS BY ELIG CLIN: HCPCS | Performed by: PHYSICIAN ASSISTANT

## 2023-04-18 PROCEDURE — 99214 OFFICE O/P EST MOD 30 MIN: CPT | Performed by: PHYSICIAN ASSISTANT

## 2023-04-18 PROCEDURE — 80048 BASIC METABOLIC PNL TOTAL CA: CPT

## 2023-04-18 PROCEDURE — 1123F ACP DISCUSS/DSCN MKR DOCD: CPT | Performed by: PHYSICIAN ASSISTANT

## 2023-04-18 PROCEDURE — 80061 LIPID PANEL: CPT

## 2023-04-18 NOTE — PROGRESS NOTES
of significant morbidity and mortality related to the patient's current medical conditions are: Intermediate. 30 minutes    The documentation recorded by the scribe, accurately and completely reflects the services I personally performed and the decisions made by me.  Kailey Mcleod PA-C April 18, 2023

## 2023-04-18 NOTE — PATIENT INSTRUCTIONS
SURVEY:    You may be receiving a survey from [employer] regarding your visit today. Please complete the survey to enable us to provide the highest quality of care to you and your family. If you cannot score us a very good on any question, please call the office to discuss how we could have made your experience a very good one. Thank you.

## 2023-04-19 ENCOUNTER — TELEPHONE (OUTPATIENT)
Dept: CARDIOLOGY | Age: 68
End: 2023-04-19

## 2023-04-19 DIAGNOSIS — E87.6 HYPOKALEMIA: Primary | ICD-10-CM

## 2023-04-19 RX ORDER — POTASSIUM CHLORIDE 20 MEQ/1
40 TABLET, EXTENDED RELEASE ORAL DAILY
Qty: 90 TABLET | Refills: 3
Start: 2023-04-19

## 2023-04-19 NOTE — RESULT ENCOUNTER NOTE
Please notify patient that their lab results are abnormal. Her potassium was low and her hemoglobin was low. I would like to increase her potassium to 40 mEq daily (2 tablets daily) and have her get repeat blood work in 1 week. Or her hemoglobin has she noticed any blood in her urine or stool? Please continue current treatment and follow up.

## 2023-04-19 NOTE — TELEPHONE ENCOUNTER
----- Message from Ana Maria Handy PA-C sent at 4/19/2023  2:56 PM EDT -----  Please notify patient that their lab results are abnormal. Her potassium was low and her hemoglobin was low. I would like to increase her potassium to 40 mEq daily (2 tablets daily) and have her get repeat blood work in 1 week. Or her hemoglobin has she noticed any blood in her urine or stool? Please continue current treatment and follow up.

## 2023-05-01 ENCOUNTER — HOSPITAL ENCOUNTER (OUTPATIENT)
Age: 68
Discharge: HOME | End: 2023-05-01
Payer: MEDICARE

## 2023-05-01 DIAGNOSIS — E87.6 HYPOKALEMIA: ICD-10-CM

## 2023-05-01 LAB
ANION GAP SERPL CALCULATED.3IONS-SCNC: 12 MMOL/L (ref 9–17)
BUN SERPL-MCNC: 12 MG/DL (ref 8–23)
BUN/CREAT BLD: 23 (ref 9–20)
CALCIUM SERPL-MCNC: 9.1 MG/DL (ref 8.6–10.4)
CHLORIDE SERPL-SCNC: 101 MMOL/L (ref 98–107)
CO2 SERPL-SCNC: 31 MMOL/L (ref 20–31)
CREAT SERPL-MCNC: 0.52 MG/DL (ref 0.5–0.9)
GFR SERPL CREATININE-BSD FRML MDRD: >60 ML/MIN/1.73M2
GLUCOSE SERPL-MCNC: 114 MG/DL (ref 70–99)
POTASSIUM SERPL-SCNC: 3.7 MMOL/L (ref 3.7–5.3)
SODIUM SERPL-SCNC: 144 MMOL/L (ref 135–144)

## 2023-05-01 PROCEDURE — 36415 COLL VENOUS BLD VENIPUNCTURE: CPT

## 2023-05-01 PROCEDURE — 80048 BASIC METABOLIC PNL TOTAL CA: CPT

## 2023-05-02 ENCOUNTER — TELEPHONE (OUTPATIENT)
Dept: CARDIOLOGY | Age: 68
End: 2023-05-02

## 2023-05-02 NOTE — TELEPHONE ENCOUNTER
----- Message from Georgi Gonzalez PA-C sent at 5/1/2023  9:20 PM EDT -----  Please notify patient that their lab results are normal.   Please continue current treatment and follow up.

## 2023-07-20 RX ORDER — PANTOPRAZOLE SODIUM 40 MG/1
TABLET, DELAYED RELEASE ORAL
Qty: 90 TABLET | Refills: 3 | Status: SHIPPED | OUTPATIENT
Start: 2023-07-20

## 2023-08-04 RX ORDER — POTASSIUM CHLORIDE 1500 MG/1
TABLET, EXTENDED RELEASE ORAL
Qty: 90 TABLET | Refills: 3 | Status: SHIPPED | OUTPATIENT
Start: 2023-08-04

## 2023-08-23 ENCOUNTER — TELEPHONE (OUTPATIENT)
Dept: ONCOLOGY | Age: 68
End: 2023-08-23

## 2023-09-14 NOTE — PROGRESS NOTES
Subjective:      Patient ID: Merrell Galeazzi is a 76 y.o. female. HPI  Patient here for follow-up for stage III COPD. Last seen in office per Dr. Alba Paul in March 2023    Patient endorses that she has no significant change in her breathing no significant cough. She does note that [employer] works great for her but she is paying approximately $115 a month, asking if there is a cheaper alternative. Reviewed drugs in the same drug class, Anoro and Bevespi. Patient will contact her pharmacist to see if there is a cheaper option for her. She does not tolerate inhaled steroids well and is not receptive to transitioning to Trelegy Ellipta or Breztri due to the steroid. Reviewed CT lung screening with patient, report indicates that she has a thyroid nodule. Patient states that her PCP is already ordered a thyroid ultrasound and this is being done next week. Medications:   Stiolto 2.5-2.5 mcg: Albuterol HFA:  Eliquis 5 mg twice daily  Oxygen at 1 LPM      Smoking status:  Patient is a former smoker, quit in 2020 with a 37.5-pack-year smoking history    PRIOR WORKUP:  PFT:  6-minute walk 9/23/2020: SPO2 on room air at rest was 93%. Patient walked for 1 minute on room air SPO2 desaturated to 79 to 88% during walking on room air. Oxygen was applied at 2 L per nasal cannula and her SPO2 remained 94 to 96% at rest and 90 to 94% with ambulation. Patient did qualify for home O2. PFT 8/27/2020: FEV1 28% of predicted with a positive postbronchodilator change to 39% of predicted. FVC 57% of predicted with a positive bronchodilator change to 62% of predicted. FEV1/FVC ratio was 38. Lung volumes by body box show RV of 139% of predicted, TLC 94% of predicted. Diffusion capacity severely reduced when corrected for alveolar volume is 44% of predicted. Normal airway resistance. Final impression: Stage III COPD with bronchodilator response that does not meet ATS criteria.   Severely reduced diffusion capacity due to

## 2023-10-04 ENCOUNTER — HOSPITAL ENCOUNTER (OUTPATIENT)
Dept: CT IMAGING | Age: 68
Discharge: HOME | End: 2023-10-06
Attending: INTERNAL MEDICINE
Payer: MEDICARE

## 2023-10-04 DIAGNOSIS — Z87.891 PERSONAL HISTORY OF TOBACCO USE: ICD-10-CM

## 2023-10-04 PROCEDURE — 71271 CT THORAX LUNG CANCER SCR C-: CPT

## 2023-10-12 ENCOUNTER — OFFICE VISIT (OUTPATIENT)
Dept: PULMONOLOGY | Age: 68
End: 2023-10-12

## 2023-10-12 VITALS
TEMPERATURE: 97.9 F | RESPIRATION RATE: 18 BRPM | HEIGHT: 65 IN | WEIGHT: 166.7 LBS | SYSTOLIC BLOOD PRESSURE: 121 MMHG | HEART RATE: 74 BPM | OXYGEN SATURATION: 94 % | BODY MASS INDEX: 27.77 KG/M2 | DIASTOLIC BLOOD PRESSURE: 60 MMHG

## 2023-10-12 DIAGNOSIS — E04.1 THYROID NODULE: ICD-10-CM

## 2023-10-12 DIAGNOSIS — Z87.891 FORMER HEAVY TOBACCO SMOKER: ICD-10-CM

## 2023-10-12 DIAGNOSIS — I27.20 PULMONARY HYPERTENSION (HCC): ICD-10-CM

## 2023-10-12 DIAGNOSIS — R91.1 LUNG NODULE: ICD-10-CM

## 2023-10-12 DIAGNOSIS — Z87.891 STOPPED SMOKING WITH GREATER THAN 30 PACK YEAR HISTORY: ICD-10-CM

## 2023-10-12 DIAGNOSIS — J96.11 CHRONIC HYPOXEMIC RESPIRATORY FAILURE (HCC): ICD-10-CM

## 2023-10-12 DIAGNOSIS — J44.9 STAGE 3 SEVERE COPD BY GOLD CLASSIFICATION (HCC): ICD-10-CM

## 2023-10-12 DIAGNOSIS — Z87.891 PERSONAL HISTORY OF TOBACCO USE: Primary | ICD-10-CM

## 2023-10-12 ASSESSMENT — ENCOUNTER SYMPTOMS
GASTROINTESTINAL NEGATIVE: 1
ALLERGIC/IMMUNOLOGIC NEGATIVE: 1
EYES NEGATIVE: 1

## 2023-10-12 NOTE — PATIENT INSTRUCTIONS
SURVEY:    You may be receiving a survey from [employer] regarding your visit today. Please complete the survey to enable us to provide the highest quality of care to you and your family. If you cannot score us a very good on any question, please call the office to discuss how we could have made your experience a very good one. Thank you. Learning About Lung Cancer Screening  What is screening for lung cancer? Lung cancer screening is a way to find some lung cancers early, before a person has any symptoms of the cancer. Lung cancer screening may help those who have the highest risk for lung cancer--people age 48 and older who are or were heavy smokers. For most people, who aren't at increased risk, screening for lung cancer probably isn't helpful. Screening won't prevent cancer. And it may not find all lung cancers. Lung cancer screening may lower the risk of dying from lung cancer in a small number of people. How is it done? Lung cancer screening is done with a low-dose CT (computed tomography) scan. A CT scan uses X-rays, or radiation, to make detailed pictures of your body. Experts recommend that screening be done in medical centers that focus on finding and treating lung cancer. Who is screening recommended for? Lung cancer screening is recommended for people age 48 and older who are or were heavy smokers. That means people with a smoking history of at least 20 pack years. A pack year is a way to measure how heavy a smoker you are or were. To figure out your pack years, multiply how many packs a day on average (assuming 20 cigarettes per pack) you have smoked by how many years you have smoked. For example: If you smoked 1 pack a day for 20 years, that's 1 times 20. So you have a smoking history of 20 pack years. If you smoked 2 packs a day for 10 years, that's 2 times 10. So you have a smoking history of 20 pack years.   Experts agree that screening is for people who have a high

## 2023-10-19 ENCOUNTER — HOSPITAL ENCOUNTER (OUTPATIENT)
Age: 68
Discharge: HOME | End: 2023-10-19
Payer: MEDICARE

## 2023-10-19 ENCOUNTER — HOSPITAL ENCOUNTER (OUTPATIENT)
Dept: ULTRASOUND IMAGING | Age: 68
Discharge: HOME | End: 2023-10-21
Payer: MEDICARE

## 2023-10-19 ENCOUNTER — OFFICE VISIT (OUTPATIENT)
Dept: CARDIOLOGY | Age: 68
End: 2023-10-19
Payer: MEDICARE

## 2023-10-19 VITALS
WEIGHT: 166 LBS | OXYGEN SATURATION: 94 % | DIASTOLIC BLOOD PRESSURE: 69 MMHG | HEIGHT: 65 IN | SYSTOLIC BLOOD PRESSURE: 115 MMHG | BODY MASS INDEX: 27.66 KG/M2 | HEART RATE: 73 BPM | RESPIRATION RATE: 18 BRPM

## 2023-10-19 DIAGNOSIS — I34.0 MODERATE MITRAL REGURGITATION: ICD-10-CM

## 2023-10-19 DIAGNOSIS — Z79.01 CHRONIC ANTICOAGULATION: ICD-10-CM

## 2023-10-19 DIAGNOSIS — I42.8 NONISCHEMIC CARDIOMYOPATHY (HCC): ICD-10-CM

## 2023-10-19 DIAGNOSIS — I07.1 MODERATE TRICUSPID REGURGITATION: ICD-10-CM

## 2023-10-19 DIAGNOSIS — I50.42 CHRONIC COMBINED SYSTOLIC AND DIASTOLIC CONGESTIVE HEART FAILURE (HCC): ICD-10-CM

## 2023-10-19 DIAGNOSIS — E04.1 THYROID NODULE: ICD-10-CM

## 2023-10-19 DIAGNOSIS — I27.20 MODERATE PULMONARY HYPERTENSION (HCC): ICD-10-CM

## 2023-10-19 DIAGNOSIS — I48.0 PAF (PAROXYSMAL ATRIAL FIBRILLATION) (HCC): ICD-10-CM

## 2023-10-19 DIAGNOSIS — I42.8 NONISCHEMIC CARDIOMYOPATHY (HCC): Primary | ICD-10-CM

## 2023-10-19 LAB
ANION GAP SERPL CALCULATED.3IONS-SCNC: 9 MMOL/L (ref 9–17)
BNP SERPL-MCNC: 161 PG/ML
BUN SERPL-MCNC: 10 MG/DL (ref 8–23)
BUN/CREAT SERPL: 17 (ref 9–20)
CALCIUM SERPL-MCNC: 8.7 MG/DL (ref 8.6–10.4)
CHLORIDE SERPL-SCNC: 102 MMOL/L (ref 98–107)
CO2 SERPL-SCNC: 29 MMOL/L (ref 20–31)
CREAT SERPL-MCNC: 0.6 MG/DL (ref 0.5–0.9)
ERYTHROCYTE [DISTWIDTH] IN BLOOD BY AUTOMATED COUNT: 23.9 % (ref 11.8–14.4)
GFR SERPL CREATININE-BSD FRML MDRD: >60 ML/MIN/1.73M2
GLUCOSE SERPL-MCNC: 138 MG/DL (ref 70–99)
HCT VFR BLD AUTO: 31.3 % (ref 36.3–47.1)
HGB BLD-MCNC: 8.5 G/DL (ref 11.9–15.1)
MCH RBC QN AUTO: 17.3 PG (ref 25.2–33.5)
MCHC RBC AUTO-ENTMCNC: 27.2 G/DL (ref 28.4–34.8)
MCV RBC AUTO: 63.6 FL (ref 82.6–102.9)
NRBC BLD-RTO: 0 PER 100 WBC
PLATELET # BLD AUTO: ABNORMAL K/UL (ref 138–453)
PLATELET, FLUORESCENCE: 365 K/UL (ref 138–453)
PLATELETS.RETICULATED NFR BLD AUTO: 6.8 % (ref 1.1–10.3)
POTASSIUM SERPL-SCNC: 4 MMOL/L (ref 3.7–5.3)
RBC # BLD AUTO: 4.92 M/UL (ref 3.95–5.11)
SODIUM SERPL-SCNC: 140 MMOL/L (ref 135–144)
WBC OTHER # BLD: 8 K/UL (ref 3.5–11.3)

## 2023-10-19 PROCEDURE — 36415 COLL VENOUS BLD VENIPUNCTURE: CPT

## 2023-10-19 PROCEDURE — 93000 ELECTROCARDIOGRAM COMPLETE: CPT | Performed by: INTERNAL MEDICINE

## 2023-10-19 PROCEDURE — 80048 BASIC METABOLIC PNL TOTAL CA: CPT

## 2023-10-19 PROCEDURE — 1123F ACP DISCUSS/DSCN MKR DOCD: CPT | Performed by: INTERNAL MEDICINE

## 2023-10-19 PROCEDURE — 76536 US EXAM OF HEAD AND NECK: CPT

## 2023-10-19 PROCEDURE — 3017F COLORECTAL CA SCREEN DOC REV: CPT | Performed by: INTERNAL MEDICINE

## 2023-10-19 PROCEDURE — G8427 DOCREV CUR MEDS BY ELIG CLIN: HCPCS | Performed by: INTERNAL MEDICINE

## 2023-10-19 PROCEDURE — G8417 CALC BMI ABV UP PARAM F/U: HCPCS | Performed by: INTERNAL MEDICINE

## 2023-10-19 PROCEDURE — 99214 OFFICE O/P EST MOD 30 MIN: CPT | Performed by: INTERNAL MEDICINE

## 2023-10-19 PROCEDURE — 1036F TOBACCO NON-USER: CPT | Performed by: INTERNAL MEDICINE

## 2023-10-19 PROCEDURE — G8400 PT W/DXA NO RESULTS DOC: HCPCS | Performed by: INTERNAL MEDICINE

## 2023-10-19 PROCEDURE — 1090F PRES/ABSN URINE INCON ASSESS: CPT | Performed by: INTERNAL MEDICINE

## 2023-10-19 PROCEDURE — 85027 COMPLETE CBC AUTOMATED: CPT

## 2023-10-19 PROCEDURE — 83880 ASSAY OF NATRIURETIC PEPTIDE: CPT

## 2023-10-19 PROCEDURE — G8484 FLU IMMUNIZE NO ADMIN: HCPCS | Performed by: INTERNAL MEDICINE

## 2023-10-19 RX ORDER — BUSPIRONE HYDROCHLORIDE 10 MG/1
TABLET ORAL
COMMUNITY
Start: 2023-10-12

## 2023-10-19 NOTE — PATIENT INSTRUCTIONS
SURVEY:    You may be receiving a survey from [employer] regarding your visit today. Please complete the survey to enable us to provide the highest quality of care to you and your family. If you cannot score us a very good on any question, please call the office to discuss how we could have made your experience a very good one. Thank you.

## 2023-10-19 NOTE — PROGRESS NOTES
treated for COPD and chronic hypoxemic respiratory failure. Using 1 L of oxygen per minute. She is seeing Dr. Dominique Martinez     EKG done  in office on 6/15/2022:     EKG done today in office 10/19/2023 No acute changes from previous. 66 bpm    Wt Readings from Last 3 Encounters:   10/19/23 75.3 kg (166 lb)   10/12/23 75.6 kg (166 lb 11.2 oz)   04/18/23 79.5 kg (175 lb 3.2 oz)       Ms. Musa Marley reports doing fairly well since last visit. She states she is doing over all well. She continues to use 1 L of oxygen. She has lost 9 pounds since last being seen. She reports she has been more active and watching what she is eating. She has chronic stable shortness of breath that has not changed or worsened. She denies chest pain, pressure or tightness. She denies any lightheaded/dizziness or palpitations. No cough, fever or chills. No abdominal pain, nausea or vomiting. No bleeding problems, bowel issues, problems with medications or any other issues at this time. Past Medical History:   Diagnosis Date    A-fib [facility])     Acute respiratory failure with hypoxia (HCC) from CHF 6/18/2020    CHF (congestive heart failure) (HCC)     COPD (chronic obstructive pulmonary disease) ([address])     Hypertension        CURRENT ALLERGIES: Patient has no known allergies. REVIEW OF SYSTEMS: 14 systems were reviewed. Pertinent positives and negatives as above, all else negative.      Past Surgical History:   Procedure Laterality Date    CHOLECYSTECTOMY      DILATATION, ESOPHAGUS      GASTRIC BYPASS SURGERY      HERNIA REPAIR      x2    TONSILLECTOMY      Social History:  Social History     Tobacco Use    Smoking status: Former     Packs/day: 1.50     Years: 25.00     Additional pack years: 0.00     Total pack years: 37.50     Types: Cigarettes     Quit date: 6/16/2020     Years since quitting: 3.3    Smokeless tobacco: Never   Vaping Use    Vaping Use: Never used   Substance Use Topics    Alcohol use: Not Currently    Drug use: Never

## 2023-10-20 ENCOUNTER — TELEPHONE (OUTPATIENT)
Dept: CARDIOLOGY | Age: 68
End: 2023-10-20

## 2023-10-20 NOTE — TELEPHONE ENCOUNTER
----- Message from Yolande Bloom MD sent at 10/19/2023 11:17 PM EDT -----  Blood work is good except for the hemoglobin that is slowly getting down. Anemia work-up is indicated, this is most likely iron deficiency anemia. Please have her contact Aleshia Gunter for further evaluation. Please send a copy of the blood work to her primary care provider.   Thank you

## 2023-10-25 DIAGNOSIS — I48.20 CHRONIC ATRIAL FIBRILLATION (HCC): Primary | ICD-10-CM

## 2023-11-20 ENCOUNTER — HOSPITAL ENCOUNTER (OUTPATIENT)
Age: 68
Discharge: HOME | End: 2023-11-20
Payer: MEDICARE

## 2023-11-20 LAB — TSH SERPL DL<=0.05 MIU/L-ACNC: 0.86 UIU/ML (ref 0.3–5)

## 2023-11-20 PROCEDURE — 36415 COLL VENOUS BLD VENIPUNCTURE: CPT

## 2023-11-20 PROCEDURE — 84443 ASSAY THYROID STIM HORMONE: CPT

## 2023-11-20 PROCEDURE — 84436 ASSAY OF TOTAL THYROXINE: CPT

## 2023-11-20 PROCEDURE — 84480 ASSAY TRIIODOTHYRONINE (T3): CPT

## 2023-11-21 LAB
T3 SERPL-MCNC: 99 NG/DL (ref 80–200)
T4 SERPL-MCNC: 7.1 UG/DL (ref 4.5–11.7)

## 2023-12-06 ENCOUNTER — HOSPITAL ENCOUNTER (OUTPATIENT)
Dept: HOSPITAL 101 - US | Age: 68
Discharge: HOME | End: 2023-12-06
Payer: MEDICARE

## 2023-12-06 VITALS — OXYGEN SATURATION: 94 % | SYSTOLIC BLOOD PRESSURE: 136 MMHG | HEART RATE: 69 BPM | DIASTOLIC BLOOD PRESSURE: 65 MMHG

## 2023-12-06 DIAGNOSIS — E04.0: Primary | ICD-10-CM

## 2023-12-06 PROCEDURE — 10005 FNA BX W/US GDN 1ST LES: CPT

## 2023-12-06 PROCEDURE — 88173 CYTOPATH EVAL FNA REPORT: CPT

## 2023-12-06 RX ADMIN — LIDOCAINE HYDROCHLORIDE 0 ML: 10 INJECTION, SOLUTION INFILTRATION; PERINEURAL at 10:45

## 2023-12-06 NOTE — SUR.PREOP
11/22 Instructed pt on procedure, date, time.  Made pt aware to hold ASA x 4 days and Eliquis x 2 days prior to biopsy.

## 2023-12-06 NOTE — US_ITS
[address] [address] 
     [phone] 
  
  
Patient Name: 
FUAD DOMINGUEZ 
  
MRN: [id number]    YOB: 1955    Sex: F 
Assigned Patient Location: US 
Current Patient Location:   
Accession/Order Number: [id number] 
Exam Date: 12/06/2023  09:30    Report Date: 12/06/2023  12:51 
  
At the request of: 
TIMO HA   
  
Procedure:  US biopsy thyroid 
  
EXAMINATION: US biopsy thyroid  
  
HISTORY: Thyroid Nodule 
  
COMPARISON: No relevant comparison available.  
  
TECHNIQUE: After obtaining informed consent, an ultrasound-guided biopsy was  
performed in the usual sterile manner.  
  
FINDINGS: 
IMAGING: Ultrasound  
BIOPSY NEEDLE: 25-gauge, 2 inch  
SPECIMEN TYPE, #, LOCATION: 3 fine-needle aspirates right thyroid 1.7 cm  
nodule 
MEDICATION: 2 cc 1% buffered lidocaine  
COMPLICATIONS: None.  
LABORATORY: Pathology and genetic testing  
OTHER: Negative.  
  
ORDER #: [id number] US/US biopsy thyroid  
IMPRESSION:   
   
Uneventful ultrasound guided biopsy. The patient was instructed to obtain   
follow up care and biopsy results from the referring physician.   
   
   
Electronically authenticated by: [facility] [employer]   Date: 12/06/2023  12:51

## 2023-12-07 ENCOUNTER — LAB REQUISITION (OUTPATIENT)
Dept: LAB | Facility: HOSPITAL | Age: 68
End: 2023-12-07

## 2023-12-08 LAB
LABORATORY COMMENT REPORT: NORMAL
PATH REPORT.GROSS SPEC: NORMAL
PATH REPORT.TOTAL CANCER: NORMAL

## 2023-12-29 RX ORDER — BUMETANIDE 1 MG/1
TABLET ORAL
Qty: 90 TABLET | Refills: 3 | Status: SHIPPED | OUTPATIENT
Start: 2023-12-29

## 2024-01-22 DIAGNOSIS — I07.1 MODERATE TRICUSPID REGURGITATION: ICD-10-CM

## 2024-01-22 DIAGNOSIS — I42.8 NONISCHEMIC CARDIOMYOPATHY (HCC): Primary | ICD-10-CM

## 2024-01-22 DIAGNOSIS — I48.20 CHRONIC ATRIAL FIBRILLATION (HCC): ICD-10-CM

## 2024-01-22 DIAGNOSIS — Z79.01 CHRONIC ANTICOAGULATION: ICD-10-CM

## 2024-01-22 DIAGNOSIS — I50.42 CHRONIC COMBINED SYSTOLIC AND DIASTOLIC CONGESTIVE HEART FAILURE (HCC): ICD-10-CM

## 2024-01-22 DIAGNOSIS — I27.20 MODERATE PULMONARY HYPERTENSION (HCC): ICD-10-CM

## 2024-01-22 DIAGNOSIS — I48.0 PAF (PAROXYSMAL ATRIAL FIBRILLATION) (HCC): ICD-10-CM

## 2024-01-22 DIAGNOSIS — I34.0 MODERATE MITRAL REGURGITATION: ICD-10-CM

## 2024-01-23 RX ORDER — LOSARTAN POTASSIUM 25 MG/1
TABLET ORAL
Qty: 45 TABLET | Refills: 3 | Status: SHIPPED | OUTPATIENT
Start: 2024-01-23

## 2024-01-30 DIAGNOSIS — J44.9 STAGE 3 SEVERE COPD BY GOLD CLASSIFICATION (HCC): ICD-10-CM

## 2024-01-30 RX ORDER — TIOTROPIUM BROMIDE AND OLODATEROL 3.124; 2.736 UG/1; UG/1
SPRAY, METERED RESPIRATORY (INHALATION)
Qty: 4 G | Refills: 11 | Status: SHIPPED | OUTPATIENT
Start: 2024-01-30

## 2024-03-21 RX ORDER — POTASSIUM CHLORIDE 1500 MG/1
20 TABLET, EXTENDED RELEASE ORAL DAILY
Qty: 90 TABLET | Refills: 3 | Status: SHIPPED | OUTPATIENT
Start: 2024-03-21

## 2024-04-02 RX ORDER — APIXABAN 5 MG/1
5 TABLET, FILM COATED ORAL 2 TIMES DAILY
Qty: 60 TABLET | Refills: 11 | Status: SHIPPED | OUTPATIENT
Start: 2024-04-02

## 2024-04-23 ENCOUNTER — TELEPHONE (OUTPATIENT)
Dept: PULMONOLOGY | Age: 69
End: 2024-04-23

## 2024-05-06 RX ORDER — ATORVASTATIN CALCIUM 10 MG/1
10 TABLET, FILM COATED ORAL NIGHTLY
Qty: 90 TABLET | Refills: 3 | Status: SHIPPED | OUTPATIENT
Start: 2024-05-06

## 2024-05-07 ENCOUNTER — HOSPITAL ENCOUNTER (OUTPATIENT)
Dept: ULTRASOUND IMAGING | Age: 69
Discharge: HOME | End: 2024-05-09
Payer: MEDICARE

## 2024-05-07 DIAGNOSIS — E04.1 THYROID NODULE: ICD-10-CM

## 2024-05-07 PROCEDURE — 76536 US EXAM OF HEAD AND NECK: CPT

## 2024-06-06 ENCOUNTER — OFFICE VISIT (OUTPATIENT)
Dept: CARDIOLOGY | Age: 69
End: 2024-06-06
Payer: MEDICARE

## 2024-06-06 VITALS
HEART RATE: 67 BPM | HEIGHT: 64 IN | BODY MASS INDEX: 26.8 KG/M2 | SYSTOLIC BLOOD PRESSURE: 113 MMHG | RESPIRATION RATE: 18 BRPM | DIASTOLIC BLOOD PRESSURE: 75 MMHG | WEIGHT: 157 LBS | OXYGEN SATURATION: 95 %

## 2024-06-06 DIAGNOSIS — I50.42 CHRONIC COMBINED SYSTOLIC AND DIASTOLIC CONGESTIVE HEART FAILURE (HCC): Primary | ICD-10-CM

## 2024-06-06 DIAGNOSIS — J44.9 STAGE 3 SEVERE COPD BY GOLD CLASSIFICATION (HCC): ICD-10-CM

## 2024-06-06 DIAGNOSIS — I42.8 NONISCHEMIC CARDIOMYOPATHY (HCC): ICD-10-CM

## 2024-06-06 DIAGNOSIS — I48.0 PAF (PAROXYSMAL ATRIAL FIBRILLATION) (HCC): ICD-10-CM

## 2024-06-06 DIAGNOSIS — I27.20 MODERATE PULMONARY HYPERTENSION (HCC): ICD-10-CM

## 2024-06-06 DIAGNOSIS — I07.1 MODERATE TRICUSPID REGURGITATION: ICD-10-CM

## 2024-06-06 DIAGNOSIS — E78.2 MIXED HYPERLIPIDEMIA: ICD-10-CM

## 2024-06-06 PROCEDURE — 1090F PRES/ABSN URINE INCON ASSESS: CPT | Performed by: INTERNAL MEDICINE

## 2024-06-06 PROCEDURE — 3017F COLORECTAL CA SCREEN DOC REV: CPT | Performed by: INTERNAL MEDICINE

## 2024-06-06 PROCEDURE — 99214 OFFICE O/P EST MOD 30 MIN: CPT | Performed by: INTERNAL MEDICINE

## 2024-06-06 PROCEDURE — 1123F ACP DISCUSS/DSCN MKR DOCD: CPT | Performed by: INTERNAL MEDICINE

## 2024-06-06 PROCEDURE — 3023F SPIROM DOC REV: CPT | Performed by: INTERNAL MEDICINE

## 2024-06-06 PROCEDURE — 1036F TOBACCO NON-USER: CPT | Performed by: INTERNAL MEDICINE

## 2024-06-06 PROCEDURE — G8417 CALC BMI ABV UP PARAM F/U: HCPCS | Performed by: INTERNAL MEDICINE

## 2024-06-06 PROCEDURE — G8427 DOCREV CUR MEDS BY ELIG CLIN: HCPCS | Performed by: INTERNAL MEDICINE

## 2024-06-06 PROCEDURE — G8400 PT W/DXA NO RESULTS DOC: HCPCS | Performed by: INTERNAL MEDICINE

## 2024-06-06 NOTE — PROGRESS NOTES
respiratory failure.  Using 1 L of oxygen per minute.  She is seeing Dr. Vasquez.     EKG done in office 10/19/2023 No acute changes from previous. 66 bpm    Ms. Roth reports doing fairly well since last visit. She states she is doing over all well. She continues to use 1 L of oxygen. She has chronic stable shortness of breath that has not changed or worsened. Her  is the one she is most concerned about at this time. He does have dementia and parkinson's disease and he is now permanently in a nursing home. She does go see him everyday. She denies chest pain, pressure or tightness. She denies any lightheaded/dizziness or palpitations. No cough, fever or chills.  No abdominal pain, nausea or vomiting. No bleeding problems, bowel issues, problems with medications or any other issues at this time.     Bleeding Risks: Ms. Roth denies any current or recent bleeding problems including a history of a GI bleed, ulcers, recent or upcoming surgeries, blood in her stool or black tarry stools or blood in her urine.    Wt Readings from Last 3 Encounters:   06/06/24 71.2 kg (157 lb)   10/19/23 75.3 kg (166 lb)   10/12/23 75.6 kg (166 lb 11.2 oz)     Past Medical History:   Diagnosis Date    A-fib (HCC)     Acute respiratory failure with hypoxia (HCC) from CHF 6/18/2020    CHF (congestive heart failure) (HCC)     COPD (chronic obstructive pulmonary disease) (HCC)     Hypertension        CURRENT ALLERGIES: Patient has no known allergies. REVIEW OF SYSTEMS: 14 systems were reviewed. Pertinent positives and negatives as above, all else negative.     Past Surgical History:   Procedure Laterality Date    CHOLECYSTECTOMY      DILATATION, ESOPHAGUS      GASTRIC BYPASS SURGERY      HERNIA REPAIR      x2    TONSILLECTOMY      Social History:  Social History     Tobacco Use    Smoking status: Former     Current packs/day: 0.00     Average packs/day: 1.5 packs/day for 25.0 years (37.5 ttl pk-yrs)     Types: Cigarettes     Start date:

## 2024-06-12 ENCOUNTER — TELEPHONE (OUTPATIENT)
Dept: CARDIOLOGY | Age: 69
End: 2024-06-12

## 2024-06-24 ENCOUNTER — OFFICE VISIT (OUTPATIENT)
Dept: PULMONOLOGY | Age: 69
End: 2024-06-24
Payer: MEDICARE

## 2024-06-24 ENCOUNTER — TELEPHONE (OUTPATIENT)
Dept: PULMONOLOGY | Age: 69
End: 2024-06-24

## 2024-06-24 VITALS
HEIGHT: 65 IN | RESPIRATION RATE: 16 BRPM | WEIGHT: 152.9 LBS | OXYGEN SATURATION: 92 % | TEMPERATURE: 97.3 F | HEART RATE: 84 BPM | SYSTOLIC BLOOD PRESSURE: 108 MMHG | BODY MASS INDEX: 25.47 KG/M2 | DIASTOLIC BLOOD PRESSURE: 73 MMHG

## 2024-06-24 DIAGNOSIS — J44.9 STAGE 3 SEVERE COPD BY GOLD CLASSIFICATION (HCC): Primary | ICD-10-CM

## 2024-06-24 DIAGNOSIS — J96.11 CHRONIC HYPOXEMIC RESPIRATORY FAILURE (HCC): ICD-10-CM

## 2024-06-24 DIAGNOSIS — R91.1 LUNG NODULE: ICD-10-CM

## 2024-06-24 PROCEDURE — G8427 DOCREV CUR MEDS BY ELIG CLIN: HCPCS | Performed by: INTERNAL MEDICINE

## 2024-06-24 PROCEDURE — G8400 PT W/DXA NO RESULTS DOC: HCPCS | Performed by: INTERNAL MEDICINE

## 2024-06-24 PROCEDURE — 1123F ACP DISCUSS/DSCN MKR DOCD: CPT | Performed by: INTERNAL MEDICINE

## 2024-06-24 PROCEDURE — 99214 OFFICE O/P EST MOD 30 MIN: CPT | Performed by: INTERNAL MEDICINE

## 2024-06-24 PROCEDURE — 3023F SPIROM DOC REV: CPT | Performed by: INTERNAL MEDICINE

## 2024-06-24 PROCEDURE — G8417 CALC BMI ABV UP PARAM F/U: HCPCS | Performed by: INTERNAL MEDICINE

## 2024-06-24 PROCEDURE — 1090F PRES/ABSN URINE INCON ASSESS: CPT | Performed by: INTERNAL MEDICINE

## 2024-06-24 PROCEDURE — 1036F TOBACCO NON-USER: CPT | Performed by: INTERNAL MEDICINE

## 2024-06-24 PROCEDURE — 3017F COLORECTAL CA SCREEN DOC REV: CPT | Performed by: INTERNAL MEDICINE

## 2024-06-24 RX ORDER — ALBUTEROL SULFATE 90 UG/1
2 AEROSOL, METERED RESPIRATORY (INHALATION) EVERY 6 HOURS PRN
Qty: 18 G | Refills: 3 | Status: SHIPPED | OUTPATIENT
Start: 2024-06-24

## 2024-06-24 RX ORDER — UMECLIDINIUM BROMIDE AND VILANTEROL TRIFENATATE 62.5; 25 UG/1; UG/1
1 POWDER RESPIRATORY (INHALATION) DAILY
Qty: 1 EACH | Refills: 0 | Status: SHIPPED | OUTPATIENT
Start: 2024-06-24 | End: 2024-09-22

## 2024-06-24 NOTE — PATIENT INSTRUCTIONS
SURVEY:    You may be receiving a survey from [employer] regarding your visit today.    Please complete the survey to enable us to provide the highest quality of care to you and your family.    If you cannot score us a very good on any question, please call the office to discuss how we could have made your experience a very good one.    Thank you.      COPD Zones education sheet was provided at discharge.  It is important to know your zones to manage your disease.  Review your zone sheet daily to determine which zone you are in each day.

## 2024-06-24 NOTE — TELEPHONE ENCOUNTER
Liliya called in and said that the inhaler (Anoro Ellipta) is $119 and to expensive for her to afford. She is asking about something cheaper. I called Shamar (pharmacist) at [facility] who and asked if insurance listed an alternative.  He said no the Anoro is covered the insurance is paying hundreds of dollars she owes $119. He said alternatives are only listed when the drug isn't covered. I called Liliya and told her she could contact her insurance to see if they could tell her which inhaler in that class they would cover to try and get something cheaper.  She said no she is going to fill out financial assistance paperwork for the Stiolto inhaler and see if she can get that.  I told her to call if she needs anything further from us.  Voices understanding.

## 2024-06-24 NOTE — PROGRESS NOTES
anemia.  Clinical correlation is advised.       6 MINUTE WALK TEST:  [] Ordered  [] Unavailable  [x] Reviewed    PRIOR EXTERNAL NOTES:  [] Unavailable  [x] Reviewed    ORDERS PLACED:  No orders of the defined types were placed in this encounter.       3. RISK OF COMPLICATIONS AND/OR MORBIDITY OR MORTALITY:     ALLERGIES:  No Known Allergies   MEDICATIONS:  Outpatient Medications Prior to Visit   Medication Sig Dispense Refill    ALBUTEROL IN Inhale into the lungs as needed      atorvastatin (LIPITOR) 10 MG tablet Take 1 tablet by mouth nightly 90 tablet 3    apixaban (ELIQUIS) 5 MG TABS tablet TAKE ONE TABLET BY MOUTH TWICE A DAY 60 tablet 11    KLOR-CON M20 20 MEQ extended release tablet TAKE ONE TABLET BY MOUTH DAILY 90 tablet 3    tiotropium-olodaterol (STIOLTO RESPIMAT) 2.5-2.5 MCG/ACT AERS INHALE TWO INHALATION(S) BY MOUTH DAILY 4 g 11    losartan (COZAAR) 25 MG tablet TAKE 1/2 TABLET BY MOUTH DAILY 45 tablet 3    metoprolol tartrate (LOPRESSOR) 25 MG tablet TAKE 1/2 TABLET BY MOUTH TWICE A DAY 90 tablet 3    bumetanide (BUMEX) 1 MG tablet TAKE ONE TABLET BY MOUTH DAILY 90 tablet 3    digoxin (LANOXIN) 250 MCG tablet TAKE ONE TABLET BY MOUTH DAILY 90 tablet 3    busPIRone (BUSPAR) 10 MG tablet       Cholecalciferol (VITAMIN D3) 1.25 MG (72787 UT) CAPS Take 1.25 capsules by mouth daily       calcium carbonate (OSCAL) 500 MG TABS tablet Take 1 tablet by mouth daily      sertraline (ZOLOFT) 100 MG tablet Take 1 tablet by mouth daily      aspirin 81 MG chewable tablet Take 1 tablet by mouth daily 30 tablet 0    Multiple Vitamins-Minerals (MULTIVITAMIN ADULT) CHEW Take 1 tablet by mouth daily       nitroGLYCERIN (NITROSTAT) 0.4 MG SL tablet up to max of 3 total doses. If no relief after 1 dose, call 911. 25 tablet 0     No facility-administered medications prior to visit.       PRESCRIPTION DRUG MANAGEMENT/RECOMMENDATIONS:  Reviewed PFT and available imaging data  Patient on Stiolto Respimat and as needed

## 2024-07-09 RX ORDER — POTASSIUM CHLORIDE 20 MEQ/1
20 TABLET, EXTENDED RELEASE ORAL DAILY
Qty: 90 TABLET | Refills: 3 | Status: SHIPPED | OUTPATIENT
Start: 2024-07-09

## 2024-07-09 RX ORDER — ATORVASTATIN CALCIUM 10 MG/1
10 TABLET, FILM COATED ORAL NIGHTLY
Qty: 90 TABLET | Refills: 3 | Status: SHIPPED | OUTPATIENT
Start: 2024-07-09

## 2024-07-15 ENCOUNTER — HOSPITAL ENCOUNTER (OUTPATIENT)
Age: 69
Discharge: HOME | End: 2024-07-17
Attending: INTERNAL MEDICINE
Payer: MEDICARE

## 2024-07-15 VITALS
HEIGHT: 65 IN | SYSTOLIC BLOOD PRESSURE: 108 MMHG | BODY MASS INDEX: 25.49 KG/M2 | WEIGHT: 153 LBS | DIASTOLIC BLOOD PRESSURE: 73 MMHG

## 2024-07-15 DIAGNOSIS — I48.0 PAF (PAROXYSMAL ATRIAL FIBRILLATION) (HCC): ICD-10-CM

## 2024-07-15 DIAGNOSIS — I27.20 MODERATE PULMONARY HYPERTENSION (HCC): ICD-10-CM

## 2024-07-15 DIAGNOSIS — I42.8 NONISCHEMIC CARDIOMYOPATHY (HCC): ICD-10-CM

## 2024-07-15 DIAGNOSIS — E78.2 MIXED HYPERLIPIDEMIA: ICD-10-CM

## 2024-07-15 DIAGNOSIS — I07.1 MODERATE TRICUSPID REGURGITATION: ICD-10-CM

## 2024-07-15 DIAGNOSIS — J44.9 STAGE 3 SEVERE COPD BY GOLD CLASSIFICATION (HCC): ICD-10-CM

## 2024-07-15 DIAGNOSIS — I50.42 CHRONIC COMBINED SYSTOLIC AND DIASTOLIC CONGESTIVE HEART FAILURE (HCC): ICD-10-CM

## 2024-07-15 LAB
ECHO AO ROOT DIAM: 2.1 CM
ECHO AO ROOT INDEX: 1.19 CM/M2
ECHO AO SINUS VALSALVA DIAM: 3 CM
ECHO AO SINUS VALSALVA INDEX: 1.69 CM/M2
ECHO AO ST JNCT DIAM: 2.2 CM
ECHO AR MAX VEL PISA: 3.2 M/S
ECHO AV CUSP MM: 1.6 CM
ECHO AV MEAN GRADIENT: 6 MMHG
ECHO AV MEAN VELOCITY: 1.1 M/S
ECHO AV PEAK GRADIENT: 12 MMHG
ECHO AV PEAK VELOCITY: 1.7 M/S
ECHO AV REGURGITANT PHT: 830 MS
ECHO AV VELOCITY RATIO: 0.82
ECHO AV VTI: 36.2 CM
ECHO BSA: 1.78 M2
ECHO EST RA PRESSURE: 3 MMHG
ECHO LA AREA 2C: 16.9 CM2
ECHO LA AREA 4C: 16 CM2
ECHO LA MAJOR AXIS: 4.9 CM
ECHO LA MINOR AXIS: 5.7 CM
ECHO LA VOL BP: 43 ML (ref 22–52)
ECHO LA VOL MOD A2C: 40 ML (ref 22–52)
ECHO LA VOL MOD A4C: 40 ML (ref 22–52)
ECHO LA VOL/BSA BIPLANE: 24 ML/M2 (ref 16–34)
ECHO LA VOLUME INDEX MOD A2C: 23 ML/M2 (ref 16–34)
ECHO LA VOLUME INDEX MOD A4C: 23 ML/M2 (ref 16–34)
ECHO LV E' LATERAL VELOCITY: 8 CM/S
ECHO LV EDV A2C: 59 ML
ECHO LV EDV A4C: 57 ML
ECHO LV EDV INDEX A4C: 32 ML/M2
ECHO LV EDV NDEX A2C: 33 ML/M2
ECHO LV EJECTION FRACTION A2C: 67 %
ECHO LV EJECTION FRACTION A4C: 63 %
ECHO LV EJECTION FRACTION BIPLANE: 67 % (ref 55–100)
ECHO LV ESV A2C: 19 ML
ECHO LV ESV A4C: 21 ML
ECHO LV ESV INDEX A2C: 11 ML/M2
ECHO LV ESV INDEX A4C: 12 ML/M2
ECHO LV FRACTIONAL SHORTENING: 33 % (ref 28–44)
ECHO LV INTERNAL DIMENSION DIASTOLE INDEX: 2.37 CM/M2
ECHO LV INTERNAL DIMENSION DIASTOLIC: 4.2 CM (ref 3.9–5.3)
ECHO LV INTERNAL DIMENSION SYSTOLIC INDEX: 1.58 CM/M2
ECHO LV INTERNAL DIMENSION SYSTOLIC: 2.8 CM
ECHO LV IVSD: 0.9 CM (ref 0.6–0.9)
ECHO LV MASS 2D: 127.8 G (ref 67–162)
ECHO LV MASS INDEX 2D: 72.2 G/M2 (ref 43–95)
ECHO LV POSTERIOR WALL DIASTOLIC: 1 CM (ref 0.6–0.9)
ECHO LV RELATIVE WALL THICKNESS RATIO: 0.48
ECHO LVOT AV VTI INDEX: 0.76
ECHO LVOT MEAN GRADIENT: 3 MMHG
ECHO LVOT PEAK GRADIENT: 8 MMHG
ECHO LVOT PEAK VELOCITY: 1.4 M/S
ECHO LVOT VTI: 27.6 CM
ECHO MV A VELOCITY: 0.85 M/S
ECHO MV E DECELERATION TIME (DT): 246 MS
ECHO MV E VELOCITY: 0.63 M/S
ECHO MV E/A RATIO: 0.74
ECHO MV E/E' LATERAL: 7.88
ECHO PV MAX VELOCITY: 1 M/S
ECHO PV PEAK GRADIENT: 4 MMHG
ECHO RIGHT VENTRICULAR SYSTOLIC PRESSURE (RVSP): 31 MMHG
ECHO TV REGURGITANT MAX VELOCITY: 2.63 M/S
ECHO TV REGURGITANT PEAK GRADIENT: 28 MMHG

## 2024-07-15 PROCEDURE — 93306 TTE W/DOPPLER COMPLETE: CPT | Performed by: INTERNAL MEDICINE

## 2024-07-15 PROCEDURE — 93306 TTE W/DOPPLER COMPLETE: CPT

## 2024-07-16 ENCOUNTER — TELEPHONE (OUTPATIENT)
Dept: CARDIOLOGY | Age: 69
End: 2024-07-16

## 2024-07-16 NOTE — TELEPHONE ENCOUNTER
----- Message from Silvia Loo MD sent at 7/16/2024 12:03 AM EDT -----  Echo didn't change from before.

## 2024-07-18 RX ORDER — ATORVASTATIN CALCIUM 10 MG/1
10 TABLET, FILM COATED ORAL NIGHTLY
Qty: 90 TABLET | Refills: 3 | Status: SHIPPED | OUTPATIENT
Start: 2024-07-18

## 2024-07-18 RX ORDER — POTASSIUM CHLORIDE 20 MEQ/1
20 TABLET, EXTENDED RELEASE ORAL DAILY
Qty: 90 TABLET | Refills: 3 | Status: SHIPPED | OUTPATIENT
Start: 2024-07-18

## 2024-07-22 DIAGNOSIS — I42.8 NONISCHEMIC CARDIOMYOPATHY (HCC): Primary | ICD-10-CM

## 2024-07-22 DIAGNOSIS — J44.9 STAGE 3 SEVERE COPD BY GOLD CLASSIFICATION (HCC): ICD-10-CM

## 2024-07-22 DIAGNOSIS — I50.42 CHRONIC COMBINED SYSTOLIC AND DIASTOLIC CONGESTIVE HEART FAILURE (HCC): ICD-10-CM

## 2024-07-22 DIAGNOSIS — I48.0 PAF (PAROXYSMAL ATRIAL FIBRILLATION) (HCC): ICD-10-CM

## 2024-07-22 DIAGNOSIS — I07.1 MODERATE TRICUSPID REGURGITATION: ICD-10-CM

## 2024-07-22 RX ORDER — POTASSIUM CHLORIDE 20 MEQ/1
20 TABLET, EXTENDED RELEASE ORAL 2 TIMES DAILY
Qty: 180 TABLET | Refills: 3 | Status: SHIPPED | OUTPATIENT
Start: 2024-07-22

## 2024-07-22 RX ORDER — PANTOPRAZOLE SODIUM 40 MG/1
40 TABLET, DELAYED RELEASE ORAL
Qty: 90 TABLET | Refills: 3 | Status: SHIPPED | OUTPATIENT
Start: 2024-07-22

## 2024-10-15 ENCOUNTER — HOSPITAL ENCOUNTER (OUTPATIENT)
Dept: CT IMAGING | Age: 69
Discharge: HOME | End: 2024-10-17
Payer: MEDICARE

## 2024-10-15 DIAGNOSIS — Z87.891 PERSONAL HISTORY OF TOBACCO USE: ICD-10-CM

## 2024-10-15 PROCEDURE — 71271 CT THORAX LUNG CANCER SCR C-: CPT

## 2024-11-14 ENCOUNTER — OFFICE VISIT (OUTPATIENT)
Dept: PULMONOLOGY | Age: 69
End: 2024-11-14

## 2024-11-14 VITALS
HEART RATE: 71 BPM | RESPIRATION RATE: 20 BRPM | WEIGHT: 158.7 LBS | BODY MASS INDEX: 26.44 KG/M2 | TEMPERATURE: 96.9 F | DIASTOLIC BLOOD PRESSURE: 63 MMHG | OXYGEN SATURATION: 96 % | SYSTOLIC BLOOD PRESSURE: 104 MMHG | HEIGHT: 65 IN

## 2024-11-14 DIAGNOSIS — J44.9 STAGE 3 SEVERE COPD BY GOLD CLASSIFICATION (HCC): Primary | ICD-10-CM

## 2024-11-14 DIAGNOSIS — J96.11 CHRONIC HYPOXEMIC RESPIRATORY FAILURE: ICD-10-CM

## 2024-11-14 DIAGNOSIS — Z87.891 PERSONAL HISTORY OF TOBACCO USE: ICD-10-CM

## 2024-11-14 RX ORDER — FERROUS SULFATE 325(65) MG
325 TABLET, DELAYED RELEASE (ENTERIC COATED) ORAL
COMMUNITY
Start: 2024-11-05

## 2024-11-14 RX ORDER — FLUTICASONE PROPIONATE 50 MCG
1 SPRAY, SUSPENSION (ML) NASAL PRN
COMMUNITY
Start: 2024-11-01

## 2024-11-14 NOTE — PATIENT INSTRUCTIONS
SURVEY:    You may be receiving a survey from [employer] regarding your visit today.    Please complete the survey to enable us to provide the highest quality of care to you and your family.    If you cannot score us a very good on any question, please call the office to discuss how we could have made your experience a very good one.    Thank you.  COPD Zones education sheet was provided at discharge.  It is important to know your zones to manage your disease.  Review your zone sheet daily to determine which zone you are in each day.           Learning About Lung Cancer Screening  What is screening for lung cancer?     Lung cancer screening is a way to find some lung cancers early, before a person has any symptoms of the cancer.  Lung cancer screening may help those who have the highest risk for lung cancer--people age 50 and older who are or were heavy smokers. For most people, who aren't at increased risk, screening for lung cancer probably isn't helpful.  Screening won't prevent cancer. And it may not find all lung cancers. Lung cancer screening may lower the risk of dying from lung cancer in a small number of people.  How is it done?  Lung cancer screening is done with a low-dose CT (computed tomography) scan. A CT scan uses X-rays, or radiation, to make detailed pictures of your body. Experts recommend that screening be done in medical centers that focus on finding and treating lung cancer.  Who is screening recommended for?  Lung cancer screening is recommended for people age 50 and older who are or were heavy smokers. That means people with a smoking history of at least 20 pack years. A pack year is a way to measure how heavy a smoker you are or were.  To figure out your pack years, multiply how many packs a day on average (assuming 20 cigarettes per pack) you have smoked by how many years you have smoked. For example:  If you smoked 1 pack a day for 20 years, that's 1 times 20. So you have a smoking

## 2024-11-14 NOTE — PROGRESS NOTES
PULMONARY MEDICINE OUTPATIENT NOTE     PATIENT:Liliya Roth  YOB: 1955  MRN:[id number]     REFERRED BY:Aleshia Gunter APRN - NP   DATE OF VISIT:11/14/2024     HISTORY     Liliya Roth is a 69 y.o. years old female, here for Established patient evaluation in the pulmonary clinic with     INITIAL VISIT (with me): 3/27/2023  LAST VISIT: 6/24/2024  TODAY's VISIT: 11/14/2024    CHIEF COMPLIANT:COPD (F/u visit, o2 on 1lpm. Denies any resp. Problems at present.CT of chest completed.)    Stage III COPD/chronic hypoxemic respiratory failure/pulm hypertension/former smoker:  Patient is a former smoker and reports that she quit smoking about 4 years ago. Her smoking use included cigarettes. She started smoking about 29 years ago. She has a 37.5 pack-year smoking history. She has never used smokeless tobacco.   Patient reports grade 3 exertional dyspnea.  She remains symptomatically stable and denies any recent flareups.       She is on home O2 at 2 L/min which she uses around-the-clock.      PFT (August 2020) showed grade 3 obstructive ventilatory impairment with significant bronchodilator response, air trapping and severe reduction in diffusion capacity.     CT chest (9/16/2022) reported to show 3 mm stable left upper lobe nodule.   LDCT (10/4/2023) reported to show stable 3 mm left upper lobe pulmonary nodule with moderate to severe emphysematous changes.  She also has a 1.7 cm right thyroid lobe nodule.  LDCT (10/15/2024) no change in 3 mm left upper lobe nodule     She is currently on Stiolto and as needed albuterol HFA/nebulizer.  She is concerned about the cost of Stiolto and is hoping to see if she can use any alternative inhaler.     History of Present Illness  The patient presents for evaluation of lung issues.    She reports feeling well overall. She uses oxygen therapy at home, typically at a rate of 2 liters per minute, but has reduced it to 1 liter per minute today. She has not experienced any

## 2024-11-14 NOTE — PROGRESS NOTES
Discussed with the patient the current USPSTF guidelines released March 9, 2021 for screening for lung cancer.    For adults aged 50 to 80 years who have a 20 pack-year smoking history and currently smoke or have quit within the past 15 years the grade B recommendation is to:  Screen for lung cancer with low-dose computed tomography (LDCT) every year.  Stop screening once a person has not smoked for 15 years or has a health problem that limits life expectancy or the ability to have lung surgery.    The patient  reports that she quit smoking about 4 years ago. Her smoking use included cigarettes. She started smoking about 29 years ago. She has a 37.5 pack-year smoking history. She has never used smokeless tobacco.. Discussed with patient the risks and benefits of screening, including over-diagnosis, false positive rate, and total radiation exposure.  The patient currently exhibits no signs or symptoms suggestive of lung cancer.  Discussed with patient the importance of compliance with yearly annual lung cancer screenings and willingness to undergo diagnosis and treatment if screening scan is positive.  In addition, the patient was counseled regarding the importance of remaining smoke free and/or total smoking cessation.    Also reviewed the following if the patient has Medicare that as of February 10, 2022, Medicare only covers LDCT screening in patients aged 50-77 with at least a 20 pack-year smoking history who currently smoke or have quit in the last 15 years

## 2024-12-17 ENCOUNTER — OFFICE VISIT (OUTPATIENT)
Dept: CARDIOLOGY | Age: 69
End: 2024-12-17
Payer: MEDICARE

## 2024-12-17 VITALS
DIASTOLIC BLOOD PRESSURE: 62 MMHG | HEART RATE: 68 BPM | BODY MASS INDEX: 26.33 KG/M2 | SYSTOLIC BLOOD PRESSURE: 97 MMHG | WEIGHT: 158 LBS | HEIGHT: 65 IN | RESPIRATION RATE: 18 BRPM

## 2024-12-17 DIAGNOSIS — I50.42 CHRONIC COMBINED SYSTOLIC AND DIASTOLIC CONGESTIVE HEART FAILURE (HCC): Primary | ICD-10-CM

## 2024-12-17 DIAGNOSIS — I42.8 NONISCHEMIC CARDIOMYOPATHY (HCC): ICD-10-CM

## 2024-12-17 DIAGNOSIS — I48.20 CHRONIC ATRIAL FIBRILLATION (HCC): ICD-10-CM

## 2024-12-17 DIAGNOSIS — E78.2 MIXED HYPERLIPIDEMIA: ICD-10-CM

## 2024-12-17 DIAGNOSIS — J44.9 STAGE 3 SEVERE COPD BY GOLD CLASSIFICATION (HCC): ICD-10-CM

## 2024-12-17 DIAGNOSIS — I48.0 PAF (PAROXYSMAL ATRIAL FIBRILLATION) (HCC): ICD-10-CM

## 2024-12-17 DIAGNOSIS — I07.1 MILD TRICUSPID REGURGITATION: ICD-10-CM

## 2024-12-17 DIAGNOSIS — I27.20 MODERATE PULMONARY HYPERTENSION (HCC): ICD-10-CM

## 2024-12-17 DIAGNOSIS — I34.0 MODERATE MITRAL REGURGITATION: ICD-10-CM

## 2024-12-17 DIAGNOSIS — I07.1 MODERATE TRICUSPID REGURGITATION: ICD-10-CM

## 2024-12-17 DIAGNOSIS — Z79.01 CHRONIC ANTICOAGULATION: ICD-10-CM

## 2024-12-17 PROCEDURE — 99214 OFFICE O/P EST MOD 30 MIN: CPT | Performed by: INTERNAL MEDICINE

## 2024-12-17 PROCEDURE — 1123F ACP DISCUSS/DSCN MKR DOCD: CPT | Performed by: INTERNAL MEDICINE

## 2024-12-17 PROCEDURE — 93000 ELECTROCARDIOGRAM COMPLETE: CPT | Performed by: INTERNAL MEDICINE

## 2024-12-17 PROCEDURE — 1159F MED LIST DOCD IN RCRD: CPT | Performed by: INTERNAL MEDICINE

## 2024-12-17 RX ORDER — METOPROLOL TARTRATE 25 MG/1
12.5 TABLET, FILM COATED ORAL 2 TIMES DAILY
Qty: 90 TABLET | Refills: 3 | Status: SHIPPED | OUTPATIENT
Start: 2024-12-17

## 2024-12-17 RX ORDER — DIGOXIN 250 MCG
250 TABLET ORAL DAILY
Qty: 90 TABLET | Refills: 3 | Status: SHIPPED | OUTPATIENT
Start: 2024-12-17

## 2024-12-17 RX ORDER — POTASSIUM CHLORIDE 1500 MG/1
20 TABLET, EXTENDED RELEASE ORAL 2 TIMES DAILY
Qty: 180 TABLET | Refills: 3 | Status: SHIPPED | OUTPATIENT
Start: 2024-12-17

## 2024-12-17 RX ORDER — PANTOPRAZOLE SODIUM 40 MG/1
40 TABLET, DELAYED RELEASE ORAL
Qty: 90 TABLET | Refills: 3 | Status: SHIPPED | OUTPATIENT
Start: 2024-12-17

## 2024-12-17 RX ORDER — LOSARTAN POTASSIUM 25 MG/1
12.5 TABLET ORAL DAILY
Qty: 45 TABLET | Refills: 3 | Status: SHIPPED | OUTPATIENT
Start: 2024-12-17

## 2025-04-21 DIAGNOSIS — I48.0 PAF (PAROXYSMAL ATRIAL FIBRILLATION) (HCC): Primary | ICD-10-CM

## 2025-07-19 DIAGNOSIS — I42.8 NONISCHEMIC CARDIOMYOPATHY (HCC): ICD-10-CM

## 2025-07-19 DIAGNOSIS — I07.1 MODERATE TRICUSPID REGURGITATION: ICD-10-CM

## 2025-07-19 DIAGNOSIS — I48.0 PAF (PAROXYSMAL ATRIAL FIBRILLATION) (HCC): ICD-10-CM

## 2025-07-19 DIAGNOSIS — I50.42 CHRONIC COMBINED SYSTOLIC AND DIASTOLIC CONGESTIVE HEART FAILURE (HCC): ICD-10-CM

## 2025-07-21 RX ORDER — POTASSIUM CHLORIDE 1500 MG/1
20 TABLET, EXTENDED RELEASE ORAL 2 TIMES DAILY
Qty: 180 TABLET | Refills: 3 | Status: SHIPPED | OUTPATIENT
Start: 2025-07-21

## 2025-08-07 ENCOUNTER — OFFICE VISIT (OUTPATIENT)
Dept: CARDIOLOGY | Age: 70
End: 2025-08-07
Payer: MEDICARE

## 2025-08-07 ENCOUNTER — HOSPITAL ENCOUNTER (OUTPATIENT)
Dept: LAB | Age: 70
Discharge: HOME | End: 2025-08-07
Payer: MEDICARE

## 2025-08-07 ENCOUNTER — RESULTS FOLLOW-UP (OUTPATIENT)
Dept: CARDIOLOGY | Age: 70
End: 2025-08-07

## 2025-08-07 VITALS
HEIGHT: 65 IN | DIASTOLIC BLOOD PRESSURE: 65 MMHG | OXYGEN SATURATION: 94 % | WEIGHT: 165.4 LBS | BODY MASS INDEX: 27.56 KG/M2 | SYSTOLIC BLOOD PRESSURE: 94 MMHG | RESPIRATION RATE: 18 BRPM | HEART RATE: 66 BPM

## 2025-08-07 DIAGNOSIS — I42.8 NONISCHEMIC CARDIOMYOPATHY (HCC): ICD-10-CM

## 2025-08-07 DIAGNOSIS — J44.9 STAGE 3 SEVERE COPD BY GOLD CLASSIFICATION (HCC): ICD-10-CM

## 2025-08-07 DIAGNOSIS — I07.1 MILD TRICUSPID REGURGITATION: ICD-10-CM

## 2025-08-07 DIAGNOSIS — Z51.81 ENCOUNTER FOR MONITORING DIGOXIN THERAPY: ICD-10-CM

## 2025-08-07 DIAGNOSIS — Z79.01 CHRONIC ANTICOAGULATION: ICD-10-CM

## 2025-08-07 DIAGNOSIS — I50.42 CHRONIC COMBINED SYSTOLIC AND DIASTOLIC CONGESTIVE HEART FAILURE (HCC): ICD-10-CM

## 2025-08-07 DIAGNOSIS — Z79.899 ENCOUNTER FOR MONITORING DIGOXIN THERAPY: ICD-10-CM

## 2025-08-07 DIAGNOSIS — I27.20 MODERATE PULMONARY HYPERTENSION (HCC): ICD-10-CM

## 2025-08-07 DIAGNOSIS — I48.0 PAF (PAROXYSMAL ATRIAL FIBRILLATION) (HCC): ICD-10-CM

## 2025-08-07 DIAGNOSIS — I48.0 PAF (PAROXYSMAL ATRIAL FIBRILLATION) (HCC): Primary | ICD-10-CM

## 2025-08-07 DIAGNOSIS — E78.2 MIXED HYPERLIPIDEMIA: ICD-10-CM

## 2025-08-07 DIAGNOSIS — I50.42 CHRONIC COMBINED SYSTOLIC AND DIASTOLIC CONGESTIVE HEART FAILURE (HCC): Primary | ICD-10-CM

## 2025-08-07 LAB — DIGOXIN SERPL-MCNC: 2.4 NG/ML (ref 0.8–2)

## 2025-08-07 PROCEDURE — 36415 COLL VENOUS BLD VENIPUNCTURE: CPT

## 2025-08-07 PROCEDURE — 1123F ACP DISCUSS/DSCN MKR DOCD: CPT | Performed by: NURSE PRACTITIONER

## 2025-08-07 PROCEDURE — 1159F MED LIST DOCD IN RCRD: CPT | Performed by: NURSE PRACTITIONER

## 2025-08-07 PROCEDURE — 80162 ASSAY OF DIGOXIN TOTAL: CPT

## 2025-08-07 PROCEDURE — 93000 ELECTROCARDIOGRAM COMPLETE: CPT | Performed by: INTERNAL MEDICINE

## 2025-08-07 PROCEDURE — 99214 OFFICE O/P EST MOD 30 MIN: CPT | Performed by: NURSE PRACTITIONER

## 2025-08-14 ENCOUNTER — HOSPITAL ENCOUNTER (OUTPATIENT)
Dept: LAB | Age: 70
Discharge: HOME | End: 2025-08-14
Payer: MEDICARE

## 2025-08-14 DIAGNOSIS — I48.0 PAF (PAROXYSMAL ATRIAL FIBRILLATION) (HCC): ICD-10-CM

## 2025-08-14 DIAGNOSIS — I50.42 CHRONIC COMBINED SYSTOLIC AND DIASTOLIC CONGESTIVE HEART FAILURE (HCC): ICD-10-CM

## 2025-08-14 DIAGNOSIS — I42.8 NONISCHEMIC CARDIOMYOPATHY (HCC): ICD-10-CM

## 2025-08-14 DIAGNOSIS — I07.1 MILD TRICUSPID REGURGITATION: ICD-10-CM

## 2025-08-14 LAB — DIGOXIN SERPL-MCNC: 0.4 NG/ML (ref 0.8–2)

## 2025-08-14 PROCEDURE — 80162 ASSAY OF DIGOXIN TOTAL: CPT

## 2025-08-14 PROCEDURE — 36415 COLL VENOUS BLD VENIPUNCTURE: CPT

## 2025-08-15 ENCOUNTER — RESULTS FOLLOW-UP (OUTPATIENT)
Dept: CARDIOLOGY | Age: 70
End: 2025-08-15

## 2025-08-25 ENCOUNTER — TELEPHONE (OUTPATIENT)
Dept: CARDIOLOGY | Age: 70
End: 2025-08-25

## 2025-08-25 DIAGNOSIS — I42.8 NONISCHEMIC CARDIOMYOPATHY (HCC): ICD-10-CM

## 2025-08-25 DIAGNOSIS — I07.1 MODERATE TRICUSPID REGURGITATION: ICD-10-CM

## 2025-08-25 DIAGNOSIS — E78.2 MIXED HYPERLIPIDEMIA: ICD-10-CM

## 2025-08-25 DIAGNOSIS — I50.42 CHRONIC COMBINED SYSTOLIC AND DIASTOLIC CONGESTIVE HEART FAILURE (HCC): ICD-10-CM

## 2025-08-25 DIAGNOSIS — I48.0 PAF (PAROXYSMAL ATRIAL FIBRILLATION) (HCC): Primary | ICD-10-CM

## 2025-08-25 DIAGNOSIS — I07.1 MILD TRICUSPID REGURGITATION: ICD-10-CM

## 2025-08-25 DIAGNOSIS — I34.0 MODERATE MITRAL REGURGITATION: ICD-10-CM

## 2025-08-25 DIAGNOSIS — I48.20 CHRONIC ATRIAL FIBRILLATION (HCC): ICD-10-CM

## 2025-08-25 DIAGNOSIS — I27.20 MODERATE PULMONARY HYPERTENSION (HCC): ICD-10-CM

## 2025-08-25 DIAGNOSIS — Z79.899 ENCOUNTER FOR MONITORING DIGOXIN THERAPY: ICD-10-CM

## 2025-08-25 DIAGNOSIS — Z79.01 CHRONIC ANTICOAGULATION: ICD-10-CM

## 2025-08-25 DIAGNOSIS — Z51.81 ENCOUNTER FOR MONITORING DIGOXIN THERAPY: ICD-10-CM

## 2025-08-25 DIAGNOSIS — J44.9 STAGE 3 SEVERE COPD BY GOLD CLASSIFICATION (HCC): ICD-10-CM
